# Patient Record
Sex: MALE | Race: WHITE | NOT HISPANIC OR LATINO | Employment: OTHER | URBAN - METROPOLITAN AREA
[De-identification: names, ages, dates, MRNs, and addresses within clinical notes are randomized per-mention and may not be internally consistent; named-entity substitution may affect disease eponyms.]

---

## 2020-01-13 ENCOUNTER — OFFICE VISIT (OUTPATIENT)
Dept: PRIMARY CARE CLINIC | Facility: CLINIC | Age: 55
End: 2020-01-13
Payer: MEDICARE

## 2020-01-13 VITALS
OXYGEN SATURATION: 98 % | HEART RATE: 108 BPM | BODY MASS INDEX: 39.56 KG/M2 | RESPIRATION RATE: 18 BRPM | TEMPERATURE: 98 F | HEIGHT: 68 IN | SYSTOLIC BLOOD PRESSURE: 126 MMHG | WEIGHT: 261 LBS | DIASTOLIC BLOOD PRESSURE: 78 MMHG

## 2020-01-13 DIAGNOSIS — R07.89 ATYPICAL CHEST PAIN: ICD-10-CM

## 2020-01-13 DIAGNOSIS — M96.1 POST LAMINECTOMY SYNDROME: ICD-10-CM

## 2020-01-13 DIAGNOSIS — S16.1XXD STRAIN OF NECK MUSCLE, SUBSEQUENT ENCOUNTER: ICD-10-CM

## 2020-01-13 DIAGNOSIS — R10.32 LEFT LOWER QUADRANT ABDOMINAL PAIN: ICD-10-CM

## 2020-01-13 DIAGNOSIS — H40.9 GLAUCOMA, UNSPECIFIED GLAUCOMA TYPE, UNSPECIFIED LATERALITY: ICD-10-CM

## 2020-01-13 DIAGNOSIS — R05.3 CHRONIC COUGH: ICD-10-CM

## 2020-01-13 DIAGNOSIS — S39.012D LUMBOSACRAL STRAIN, SUBSEQUENT ENCOUNTER: ICD-10-CM

## 2020-01-13 DIAGNOSIS — I10 HYPERTENSION, UNSPECIFIED TYPE: ICD-10-CM

## 2020-01-13 DIAGNOSIS — R06.02 SHORTNESS OF BREATH: ICD-10-CM

## 2020-01-13 DIAGNOSIS — F41.9 ANXIETY: ICD-10-CM

## 2020-01-13 DIAGNOSIS — C44.90 SKIN CANCER: ICD-10-CM

## 2020-01-13 DIAGNOSIS — M51.36 DDD (DEGENERATIVE DISC DISEASE), LUMBAR: ICD-10-CM

## 2020-01-13 DIAGNOSIS — E78.5 HYPERLIPIDEMIA, UNSPECIFIED HYPERLIPIDEMIA TYPE: ICD-10-CM

## 2020-01-13 DIAGNOSIS — M50.30 DDD (DEGENERATIVE DISC DISEASE), CERVICAL: ICD-10-CM

## 2020-01-13 DIAGNOSIS — R51.9 NONINTRACTABLE HEADACHE, UNSPECIFIED CHRONICITY PATTERN, UNSPECIFIED HEADACHE TYPE: ICD-10-CM

## 2020-01-13 DIAGNOSIS — E66.9 OBESITY, UNSPECIFIED CLASSIFICATION, UNSPECIFIED OBESITY TYPE, UNSPECIFIED WHETHER SERIOUS COMORBIDITY PRESENT: ICD-10-CM

## 2020-01-13 DIAGNOSIS — F32.A DEPRESSION, UNSPECIFIED DEPRESSION TYPE: ICD-10-CM

## 2020-01-13 DIAGNOSIS — G89.4 CHRONIC PAIN SYNDROME: ICD-10-CM

## 2020-01-13 DIAGNOSIS — K43.9 VENTRAL HERNIA WITHOUT OBSTRUCTION OR GANGRENE: ICD-10-CM

## 2020-01-13 DIAGNOSIS — K59.00 CONSTIPATION, UNSPECIFIED CONSTIPATION TYPE: ICD-10-CM

## 2020-01-13 DIAGNOSIS — R42 DIZZINESS: ICD-10-CM

## 2020-01-13 DIAGNOSIS — N62 GYNECOMASTIA: ICD-10-CM

## 2020-01-13 DIAGNOSIS — R32 URINARY INCONTINENCE, UNSPECIFIED TYPE: ICD-10-CM

## 2020-01-13 PROBLEM — S39.012A LUMBOSACRAL STRAIN: Status: ACTIVE | Noted: 2020-01-13

## 2020-01-13 PROBLEM — S16.1XXA CERVICAL STRAIN: Status: ACTIVE | Noted: 2020-01-13

## 2020-01-13 PROCEDURE — 99204 OFFICE O/P NEW MOD 45 MIN: CPT | Mod: S$GLB,,, | Performed by: FAMILY MEDICINE

## 2020-01-13 PROCEDURE — 3008F BODY MASS INDEX DOCD: CPT | Mod: CPTII,S$GLB,, | Performed by: FAMILY MEDICINE

## 2020-01-13 PROCEDURE — 99999 PR PBB SHADOW E&M-NEW PATIENT-LVL V: ICD-10-PCS | Mod: PBBFAC,,, | Performed by: FAMILY MEDICINE

## 2020-01-13 PROCEDURE — 3008F PR BODY MASS INDEX (BMI) DOCUMENTED: ICD-10-PCS | Mod: CPTII,S$GLB,, | Performed by: FAMILY MEDICINE

## 2020-01-13 PROCEDURE — 99204 PR OFFICE/OUTPT VISIT, NEW, LEVL IV, 45-59 MIN: ICD-10-PCS | Mod: S$GLB,,, | Performed by: FAMILY MEDICINE

## 2020-01-13 PROCEDURE — 99999 PR PBB SHADOW E&M-NEW PATIENT-LVL V: CPT | Mod: PBBFAC,,, | Performed by: FAMILY MEDICINE

## 2020-01-13 RX ORDER — GABAPENTIN 800 MG/1
1 TABLET ORAL 2 TIMES DAILY
COMMUNITY
Start: 2020-01-02 | End: 2022-06-28 | Stop reason: SDUPTHER

## 2020-01-13 RX ORDER — LATANOPROST 50 UG/ML
1 SOLUTION/ DROPS OPHTHALMIC NIGHTLY
COMMUNITY
Start: 2019-12-04

## 2020-01-13 RX ORDER — AZITHROMYCIN 250 MG/1
TABLET, FILM COATED ORAL
Qty: 6 TABLET | Refills: 0 | Status: SHIPPED | OUTPATIENT
Start: 2020-01-13 | End: 2020-01-17

## 2020-01-13 RX ORDER — CYCLOBENZAPRINE HCL 10 MG
1 TABLET ORAL NIGHTLY PRN
COMMUNITY
Start: 2020-01-02 | End: 2022-06-28 | Stop reason: SDUPTHER

## 2020-01-13 RX ORDER — IBUPROFEN 800 MG/1
1 TABLET ORAL EVERY 6 HOURS PRN
COMMUNITY
Start: 2020-01-02 | End: 2022-09-21 | Stop reason: CLARIF

## 2020-01-13 RX ORDER — ATORVASTATIN CALCIUM 10 MG/1
1 TABLET, FILM COATED ORAL DAILY
COMMUNITY
Start: 2020-01-02 | End: 2020-01-21 | Stop reason: DRUGHIGH

## 2020-01-13 RX ORDER — HYDROCODONE BITARTRATE AND ACETAMINOPHEN 10; 325 MG/1; MG/1
1 TABLET ORAL EVERY 6 HOURS PRN
COMMUNITY
Start: 2020-01-02 | End: 2024-02-06

## 2020-01-13 RX ORDER — SERTRALINE HYDROCHLORIDE 100 MG/1
1 TABLET, FILM COATED ORAL DAILY
COMMUNITY
Start: 2020-01-02 | End: 2022-06-28 | Stop reason: SDUPTHER

## 2020-01-13 RX ORDER — FENOFIBRATE 145 MG/1
1 TABLET, FILM COATED ORAL ONCE
COMMUNITY
Start: 2020-01-02 | End: 2020-12-09 | Stop reason: SDUPTHER

## 2020-01-13 RX ORDER — ENALAPRIL MALEATE AND HYDROCHLOROTHIAZIDE 10; 25 MG/1; MG/1
1 TABLET ORAL EVERY MORNING
COMMUNITY
Start: 2020-01-02 | End: 2020-12-09 | Stop reason: SDUPTHER

## 2020-01-13 NOTE — PROGRESS NOTES
Subjective:       Patient ID: Collin Almaguer Sr. is a 54 y.o. male.    Chief Complaint: Establish Care and Headache    HPI:  54 year white male in for new PCP complaining of a headache.  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  Hit head back.  That is 1 back problems started and HA's.  Last 5-6 years neck is gotten markedly worse with stiffness--id bad headaches and nausea.  History of about 5 different back injuries-- sees Dr Tunde Kim--pain MD--history of a laminectomy and a back fusion to remove a tumor left patient on disability--did not heal right--2010 Nov was the date of the surgery.      HA-- feels neck stiff in sides--top--all over---quality--throbbing, severity--ibuprofen 800 mg--occasionally knocks it out--frequency 4-5 times per week  duration with ibuprofen occasionally going to 10 min occasion last a few hours tries to lay down and relax.  No real workup for neck  ROS:  Skin: no psoriasis, eczema, skin cancer--Skin Cancer left cheek--Dr Wise-he did a biopsy--referred to the [facility] and [place]. Dr Esteves   HEENT: + headache, ocular pain, blurred vision, diplopia, epistaxis, hoarseness change in voice, thyroid trouble +glaucoma itis bilaterally left greater than right  Lung: No pneumonia, asthma, Tb, wheezing, SOB, no smoking  Heart:  +occasional chest pain,no ankle edema, palpitations, MI, tiffanie murmur,+hypertension,+ hyperlipidemia--no stent bypass arrhythmia  Abdomen: +nausea, no  vomiting, diarrhea,+ constipation, no  ulcers, hepatitis, gallbladder disease, melena, hematochezia, hematemesis some pain occasionally in the right lower quadrant  : no UTI, renal disease, stones prostate  MS: no fractures, O/A,  lupus, rheumatoid, gout--neck and back problem see history of present illness  Neuro: +  Dizziness, no  LOC, seizures +headache   No diabetes, no anemia, n+nxiety, + depression on meds Disabled  --being home all the time makes patient depressed--found father is at home  2014  , 5 children, disability lives alone        Objective:   Physical Exam:  General: Well nourished, well developed, no acute distress + obesity  Skin:  Skin lesion left cheek and just above the upper lip area be shaped scar with slight ulcer in the center quarter cm   HEENT: Eyes PERRLA, EOM intact, nose patent, throat non-erythematous ears TMs clear  NECK: Supple, no bruits, No JVD, no nodes  Breast bilateral breast nodules   Lungs: Clear, no rales, rhonchi, wheezing  Heart: Regular rate and rhythm, no murmurs, gallops, or rubs  Abdomen: flat, bowel sounds positive, no tenderness, or organomegaly slight ventral hernia between xiphoid and the umbilicus history tenderness right lower quadrant none now  Genitalia circumcised no hernia tests normal  Rectal exam negative   MS: Range of motion and muscle strength intact --tenderness cervical spine C3 through 7 pain with lateral flexion rotation range of motion muscle strength the arms intact tenderness lumbar   spine L1-S1 anterior flexion 10° extension 10° lateral flexion rotation 10° able squat arise without difficulty reflexes 2/4  Neuro: Alert, CN intact, oriented X 3 Romberg negative heel-toe slight swaying  Extremities: No cyanosis, clubbing, or edema         Assessment:       1. Nonintractable headache, unspecified chronicity pattern, unspecified headache type    2. Glaucoma, unspecified glaucoma type, unspecified laterality    3. Skin cancer    4. Urinary incontinence, unspecified type    5. Gynecomastia    6. Chronic cough    7. Shortness of breath    8. Obesity, unspecified classification, unspecified obesity type, unspecified whether serious comorbidity present     9. Constipation, unspecified constipation type    10. Strain of neck muscle, subsequent encounter    11. Lumbosacral strain, subsequent encounter    12. Post laminectomy syndrome    13. DDD (degenerative disc disease), lumbar    14. DDD (degenerative disc disease), cervical    15. Chronic pain syndrome    16. Hypertension, unspecified type    17. Hyperlipidemia, unspecified hyperlipidemia type    18. Left lower quadrant abdominal pain    19. Dizziness    20. Atypical chest pain    21. Anxiety    22. Ventral hernia without obstruction or gangrene    23. Depression, unspecified depression type        Plan:       Nonintractable headache, unspecified chronicity pattern, unspecified headache type  -     MRI Brain W WO Contrast; Future; Expected date: 01/13/2020  -     T4, free; Future; Expected date: 01/13/2020  -     TSH; Future; Expected date: 01/13/2020  -     Testosterone; Future; Expected date: 01/13/2020  -     Testosterone, free; Future; Expected date: 01/13/2020    Glaucoma, unspecified glaucoma type, unspecified laterality  -     T4, free; Future; Expected date: 01/13/2020  -     TSH; Future; Expected date: 01/13/2020  -     Testosterone; Future; Expected date: 01/13/2020  -     Testosterone, free; Future; Expected date: 01/13/2020    Skin cancer  -     T4, free; Future; Expected date: 01/13/2020  -     TSH; Future; Expected date: 01/13/2020  -     Testosterone; Future; Expected date: 01/13/2020  -     Testosterone, free; Future; Expected date: 01/13/2020    Urinary incontinence, unspecified type  -     Ambulatory referral to Urology  -     PSA, Screening; Future; Expected date: 01/13/2020  -     T4, free; Future; Expected date: 01/13/2020  -     TSH; Future; Expected date: 01/13/2020  -     Testosterone; Future; Expected date: 01/13/2020  -     Testosterone, free; Future; Expected date: 01/13/2020    Gynecomastia  -     T4, free; Future; Expected date: 01/13/2020  -     TSH; Future; Expected date:  01/13/2020  -     Testosterone; Future; Expected date: 01/13/2020  -     Testosterone, free; Future; Expected date: 01/13/2020  -     Mammo Digital Screening Bilat without CA; Future; Expected date: 01/27/2020    Chronic cough  -     T4, free; Future; Expected date: 01/13/2020  -     TSH; Future; Expected date: 01/13/2020  -     Testosterone; Future; Expected date: 01/13/2020  -     Testosterone, free; Future; Expected date: 01/13/2020    Shortness of breath  -     T4, free; Future; Expected date: 01/13/2020  -     TSH; Future; Expected date: 01/13/2020  -     Testosterone; Future; Expected date: 01/13/2020  -     Testosterone, free; Future; Expected date: 01/13/2020    Obesity, unspecified classification, unspecified obesity type, unspecified whether serious comorbidity present  -     T4, free; Future; Expected date: 01/13/2020  -     TSH; Future; Expected date: 01/13/2020  -     Testosterone; Future; Expected date: 01/13/2020  -     Testosterone, free; Future; Expected date: 01/13/2020    Constipation, unspecified constipation type  -     CT Abdomen Pelvis W Wo Contrast; Future; Expected date: 01/13/2020  -     T4, free; Future; Expected date: 01/13/2020  -     TSH; Future; Expected date: 01/13/2020  -     Testosterone; Future; Expected date: 01/13/2020  -     Testosterone, free; Future; Expected date: 01/13/2020    Strain of neck muscle, subsequent encounter  -     T4, free; Future; Expected date: 01/13/2020  -     TSH; Future; Expected date: 01/13/2020  -     Testosterone; Future; Expected date: 01/13/2020  -     Testosterone, free; Future; Expected date: 01/13/2020    Lumbosacral strain, subsequent encounter  -     T4, free; Future; Expected date: 01/13/2020  -     TSH; Future; Expected date: 01/13/2020  -     Testosterone; Future; Expected date: 01/13/2020  -     Testosterone, free; Future; Expected date: 01/13/2020    Post laminectomy syndrome  -     T4, free; Future; Expected date: 01/13/2020  -     TSH;  Future; Expected date: 01/13/2020  -     Testosterone; Future; Expected date: 01/13/2020  -     Testosterone, free; Future; Expected date: 01/13/2020    DDD (degenerative disc disease), lumbar  -     Sedimentation rate; Future; Expected date: 01/13/2020  -     RPR; Future; Expected date: 01/13/2020  -     Rheumatoid factor; Future; Expected date: 01/13/2020  -     GILA Screen w/Reflex; Future; Expected date: 01/13/2020  -     T4, free; Future; Expected date: 01/13/2020  -     TSH; Future; Expected date: 01/13/2020  -     Testosterone; Future; Expected date: 01/13/2020  -     Testosterone, free; Future; Expected date: 01/13/2020    DDD (degenerative disc disease), cervical  -     Sedimentation rate; Future; Expected date: 01/13/2020  -     RPR; Future; Expected date: 01/13/2020  -     Rheumatoid factor; Future; Expected date: 01/13/2020  -     GILA Screen w/Reflex; Future; Expected date: 01/13/2020  -     T4, free; Future; Expected date: 01/13/2020  -     TSH; Future; Expected date: 01/13/2020  -     Testosterone; Future; Expected date: 01/13/2020  -     Testosterone, free; Future; Expected date: 01/13/2020    Chronic pain syndrome  -     Sedimentation rate; Future; Expected date: 01/13/2020  -     RPR; Future; Expected date: 01/13/2020  -     Rheumatoid factor; Future; Expected date: 01/13/2020  -     GILA Screen w/Reflex; Future; Expected date: 01/13/2020  -     T4, free; Future; Expected date: 01/13/2020  -     TSH; Future; Expected date: 01/13/2020  -     Testosterone; Future; Expected date: 01/13/2020  -     Testosterone, free; Future; Expected date: 01/13/2020    Hypertension, unspecified type  -     CBC auto differential; Future; Expected date: 01/13/2020  -     EKG 12-lead; Future  -     Fecal Immunochemical Test (iFOBT); Future; Expected date: 01/13/2020  -     Hemoglobin A1c; Future; Expected date: 01/13/2020  -     Lipid panel; Future; Expected date: 01/13/2020  -     X-Ray Chest PA And Lateral; Future;  Expected date: 01/13/2020  -     POCT urine dipstick without microscope  -     T4, free; Future; Expected date: 01/13/2020  -     TSH; Future; Expected date: 01/13/2020  -     Testosterone; Future; Expected date: 01/13/2020  -     Testosterone, free; Future; Expected date: 01/13/2020    Hyperlipidemia, unspecified hyperlipidemia type  -     T4, free; Future; Expected date: 01/13/2020  -     TSH; Future; Expected date: 01/13/2020  -     Testosterone; Future; Expected date: 01/13/2020  -     Testosterone, free; Future; Expected date: 01/13/2020    Left lower quadrant abdominal pain  -     CT Abdomen Pelvis W Wo Contrast; Future; Expected date: 01/13/2020  -     T4, free; Future; Expected date: 01/13/2020  -     TSH; Future; Expected date: 01/13/2020  -     Testosterone; Future; Expected date: 01/13/2020  -     Testosterone, free; Future; Expected date: 01/13/2020    Dizziness  -     T4, free; Future; Expected date: 01/13/2020  -     TSH; Future; Expected date: 01/13/2020  -     Testosterone; Future; Expected date: 01/13/2020  -     Testosterone, free; Future; Expected date: 01/13/2020    Atypical chest pain  -     T4, free; Future; Expected date: 01/13/2020  -     TSH; Future; Expected date: 01/13/2020  -     Testosterone; Future; Expected date: 01/13/2020  -     Testosterone, free; Future; Expected date: 01/13/2020    Anxiety  -     Ambulatory consult to Psychology  -     T4, free; Future; Expected date: 01/13/2020  -     TSH; Future; Expected date: 01/13/2020  -     Testosterone; Future; Expected date: 01/13/2020  -     Testosterone, free; Future; Expected date: 01/13/2020    Ventral hernia without obstruction or gangrene  -     CT Abdomen Pelvis W Wo Contrast; Future; Expected date: 01/13/2020  -     T4, free; Future; Expected date: 01/13/2020  -     TSH; Future; Expected date: 01/13/2020  -     Testosterone; Future; Expected date: 01/13/2020  -     Testosterone, free; Future; Expected date:  01/13/2020    Depression, unspecified depression type  -     Ambulatory consult to Psychology  -     T4, free; Future; Expected date: 01/13/2020  -     TSH; Future; Expected date: 01/13/2020  -     Testosterone; Future; Expected date: 01/13/2020  -     Testosterone, free; Future; Expected date: 01/13/2020      headache--patient needs to do a headache diary---list quality of the headache/severity of the headache/distribution of the headache/frequency of the headache/in duration--Fioricet 1 p.o. q.d. p.r.n. Headache--MRI of the brain with without gadolinium patient worried has a aneurysm--if negative and headaches persistent appears to be coming from the neck may consider MRI the neck but probably had this done in the past since has DDD cervical spine  Atypical chest pain/hypertension/hyperlipidemia/obesity--needs to be on low-sodium low-fat diet--exercise as tolerated--trying get to ideal body weight--Needs routine lab CBCs CMP lipids T4 TSH stool guaiac UA chest x-ray EKG is physical Needs arm blood pressure cuff check blood pressure daily Needs blood pressure be 140/90  Breast tenderness with gynecomastia--mammogram--check testosterone labels free and total  Anxiety/depression---patient upset homebound not able to work on disability father found dead 2014 --needs psychologist for counseling  Chronic cough Zithromax/Mucinex DM since patient in chronic pain clinic   Continue with chronic pain clinic--chronic pain syndrome--DDD cervical DDD lumbar--history [place] laminectomy and lumbar fusion on disability since 2010  Constipation--increase fiber increased fruits increase water avoid rice and bananas--MiraLax 1 cap in 6 oz of water q.d. for constipation--chronic right lower quadrant pain--ventral hernia--needs CT scan abdomen pelvis  Dizziness  Skin cancer Keep appointment Dr. [facility]  Urinary incontinence--unable reach prostate for good exam--will do PSA level--referral to urologist due to  urinary incontinence

## 2020-01-14 PROBLEM — E29.1 HYPOGONADISM MALE: Status: ACTIVE | Noted: 2020-01-14

## 2020-01-15 ENCOUNTER — TELEPHONE (OUTPATIENT)
Dept: PRIMARY CARE CLINIC | Facility: CLINIC | Age: 55
End: 2020-01-15

## 2020-01-15 NOTE — TELEPHONE ENCOUNTER
----- Message from Olga Ludwig sent at 1/15/2020  1:09 PM CST -----  Contact: Pt [phone]  Telephone consult    The patient wants to make sure you understood the cough: It comes every few days or twice a week therefore, he wants to know if you still want him to take the Zpac.    Thank you

## 2020-01-15 NOTE — TELEPHONE ENCOUNTER
Verbalized to patient that since his cough is chronic and CXR is normal I will leave it up to him if he would like to start the Azithromycin. Verbalized to patient labs are still pending. Someone from the office will call with the results once final. Patient also has questions regarding price of MRI, CT, and mammogram. Verbalized he needs to contact the billing department in regards to pricing. Patient verbalized understanding.

## 2020-01-16 ENCOUNTER — TELEPHONE (OUTPATIENT)
Dept: PRIMARY CARE CLINIC | Facility: CLINIC | Age: 55
End: 2020-01-16

## 2020-01-16 NOTE — TELEPHONE ENCOUNTER
Per [facility] pharmacy, JUAN number for physician already provided. Nothing further discussed.

## 2020-01-16 NOTE — TELEPHONE ENCOUNTER
----- Message from Renetta Chakraborty sent at 1/16/2020  1:34 PM CST -----  Contact: Thelma with [facility] phone [phone]  Type:  Pharmacy Calling to Clarify an RX    Name of Caller:  Thelma  Pharmacy Name:  [facility]  Prescription Name:  Valium 10 mg  What do they need to clarify?:  Needs JUAN number on the prescription  Best Call Back Number:  [phone]  Additional Information:  Please advise. Thanks.

## 2020-01-21 NOTE — TELEPHONE ENCOUNTER
"Per your lab results "Call tell chest Xray NEG lLAb total testosteroine low 112 sed rate42 but GILA,Rheu Factor RPR -- Chol 310 better 180,  better 150 HDL 15 can increase with exercise PSA TSH T4 HGB A1C 5.3 See if pton atorvastatin when test done if soincrease to 20 mg 1 po qPM redo lab 6 mo CBC,CMP,lipid see me 2 week lemuel Thomason D/C lopid and start fenfibrate 134 mg 1 po qd Low NA Low Fat diet [id number] as able decrease weight if needed Needs get copies all lab see urologist see about getting on testoisterone "    Please sign new rx for Atorvastatin. Patient is not taking Lopid. He is already on 145mg of Fenofibrate. Patient already has a referral to Dr. Che. I have faxed his labs and told he needs to call to make an appointment   "

## 2020-01-22 RX ORDER — ATORVASTATIN CALCIUM 20 MG/1
20 TABLET, FILM COATED ORAL DAILY
Qty: 90 TABLET | Refills: 3 | Status: SHIPPED | OUTPATIENT
Start: 2020-01-22 | End: 2020-12-09 | Stop reason: SDUPTHER

## 2020-01-24 PROBLEM — N20.0 RIGHT NEPHROLITHIASIS: Status: ACTIVE | Noted: 2020-01-24

## 2020-01-24 PROBLEM — N18.1 CHRONIC RENAL IMPAIRMENT, STAGE 1: Status: ACTIVE | Noted: 2020-01-24

## 2020-01-24 PROBLEM — Z87.19 HISTORY OF DIVERTICULOSIS: Status: ACTIVE | Noted: 2020-01-24

## 2020-01-28 ENCOUNTER — TELEPHONE (OUTPATIENT)
Dept: PRIMARY CARE CLINIC | Facility: CLINIC | Age: 55
End: 2020-01-28

## 2020-01-28 NOTE — TELEPHONE ENCOUNTER
Patient returned my call, mammogram and MRI of brain results discussed. Patient verbalized understanding. Patient requesting results of CT and creatinine Serum, ASAP. Please review.

## 2020-01-28 NOTE — TELEPHONE ENCOUNTER
----- Message from Eriebrto Schulte sent at 1/28/2020  3:43 PM CST -----  Contact: Pt  Patient called to have the results of his test read to him requesting callback    [phone]

## 2020-01-28 NOTE — TELEPHONE ENCOUNTER
"Returned patient's call at the number provided. "Call can not be completed at this time." Unable to leave VM.   "

## 2020-02-06 DIAGNOSIS — I10 HYPERTENSION: ICD-10-CM

## 2020-03-31 ENCOUNTER — NURSE TRIAGE (OUTPATIENT)
Dept: ADMINISTRATIVE | Facility: CLINIC | Age: 55
End: 2020-03-31

## 2020-03-31 ENCOUNTER — OFFICE VISIT (OUTPATIENT)
Dept: PRIMARY CARE CLINIC | Facility: CLINIC | Age: 55
End: 2020-03-31
Payer: MEDICARE

## 2020-03-31 ENCOUNTER — TELEPHONE (OUTPATIENT)
Dept: PRIMARY CARE CLINIC | Facility: CLINIC | Age: 55
End: 2020-03-31

## 2020-03-31 VITALS
TEMPERATURE: 98 F | BODY MASS INDEX: 41.65 KG/M2 | HEART RATE: 76 BPM | RESPIRATION RATE: 18 BRPM | WEIGHT: 274.81 LBS | OXYGEN SATURATION: 97 % | DIASTOLIC BLOOD PRESSURE: 76 MMHG | SYSTOLIC BLOOD PRESSURE: 120 MMHG | HEIGHT: 68 IN

## 2020-03-31 DIAGNOSIS — E29.1 HYPOGONADISM MALE: ICD-10-CM

## 2020-03-31 DIAGNOSIS — N18.1 CHRONIC RENAL IMPAIRMENT, STAGE 1: ICD-10-CM

## 2020-03-31 DIAGNOSIS — M51.36 DDD (DEGENERATIVE DISC DISEASE), LUMBAR: ICD-10-CM

## 2020-03-31 DIAGNOSIS — I10 HYPERTENSION, UNSPECIFIED TYPE: ICD-10-CM

## 2020-03-31 DIAGNOSIS — E66.9 OBESITY, UNSPECIFIED CLASSIFICATION, UNSPECIFIED OBESITY TYPE, UNSPECIFIED WHETHER SERIOUS COMORBIDITY PRESENT: ICD-10-CM

## 2020-03-31 DIAGNOSIS — M50.30 DDD (DEGENERATIVE DISC DISEASE), CERVICAL: ICD-10-CM

## 2020-03-31 DIAGNOSIS — L25.9 CONTACT DERMATITIS, UNSPECIFIED CONTACT DERMATITIS TYPE, UNSPECIFIED TRIGGER: ICD-10-CM

## 2020-03-31 DIAGNOSIS — E78.5 HYPERLIPIDEMIA, UNSPECIFIED HYPERLIPIDEMIA TYPE: ICD-10-CM

## 2020-03-31 PROCEDURE — 99999 PR PBB SHADOW E&M-EST. PATIENT-LVL IV: CPT | Mod: PBBFAC,,, | Performed by: FAMILY MEDICINE

## 2020-03-31 PROCEDURE — 96372 PR INJECTION,THERAP/PROPH/DIAG2ST, IM OR SUBCUT: ICD-10-PCS | Mod: S$GLB,,, | Performed by: FAMILY MEDICINE

## 2020-03-31 PROCEDURE — 3008F PR BODY MASS INDEX (BMI) DOCUMENTED: ICD-10-PCS | Mod: CPTII,S$GLB,, | Performed by: FAMILY MEDICINE

## 2020-03-31 PROCEDURE — 3078F DIAST BP <80 MM HG: CPT | Mod: CPTII,S$GLB,, | Performed by: FAMILY MEDICINE

## 2020-03-31 PROCEDURE — 3078F PR MOST RECENT DIASTOLIC BLOOD PRESSURE < 80 MM HG: ICD-10-PCS | Mod: CPTII,S$GLB,, | Performed by: FAMILY MEDICINE

## 2020-03-31 PROCEDURE — 96372 THER/PROPH/DIAG INJ SC/IM: CPT | Mod: S$GLB,,, | Performed by: FAMILY MEDICINE

## 2020-03-31 PROCEDURE — 3008F BODY MASS INDEX DOCD: CPT | Mod: CPTII,S$GLB,, | Performed by: FAMILY MEDICINE

## 2020-03-31 PROCEDURE — 3074F SYST BP LT 130 MM HG: CPT | Mod: CPTII,S$GLB,, | Performed by: FAMILY MEDICINE

## 2020-03-31 PROCEDURE — 99213 OFFICE O/P EST LOW 20 MIN: CPT | Mod: 25,S$GLB,, | Performed by: FAMILY MEDICINE

## 2020-03-31 PROCEDURE — 3074F PR MOST RECENT SYSTOLIC BLOOD PRESSURE < 130 MM HG: ICD-10-PCS | Mod: CPTII,S$GLB,, | Performed by: FAMILY MEDICINE

## 2020-03-31 PROCEDURE — 99213 PR OFFICE/OUTPT VISIT, EST, LEVL III, 20-29 MIN: ICD-10-PCS | Mod: 25,S$GLB,, | Performed by: FAMILY MEDICINE

## 2020-03-31 PROCEDURE — 99999 PR PBB SHADOW E&M-EST. PATIENT-LVL IV: ICD-10-PCS | Mod: PBBFAC,,, | Performed by: FAMILY MEDICINE

## 2020-03-31 RX ORDER — LORATADINE 10 MG/1
10 TABLET ORAL DAILY
Qty: 30 TABLET | Refills: 5 | Status: SHIPPED | OUTPATIENT
Start: 2020-03-31 | End: 2020-12-09

## 2020-03-31 RX ORDER — TRIAMCINOLONE ACETONIDE 40 MG/ML
40 INJECTION, SUSPENSION INTRA-ARTICULAR; INTRAMUSCULAR ONCE
Status: COMPLETED | OUTPATIENT
Start: 2020-03-31 | End: 2020-03-31

## 2020-03-31 RX ORDER — BETAMETHASONE VALERATE 1.2 MG/G
CREAM TOPICAL 2 TIMES DAILY
Qty: 60 G | Refills: 2 | Status: SHIPPED | OUTPATIENT
Start: 2020-03-31 | End: 2022-09-21 | Stop reason: CLARIF

## 2020-03-31 RX ORDER — TESTOSTERONE CYPIONATE 200 MG/ML
INJECTION, SOLUTION INTRAMUSCULAR
COMMUNITY
Start: 2020-03-02 | End: 2022-09-21 | Stop reason: CLARIF

## 2020-03-31 RX ORDER — METHYLPREDNISOLONE 4 MG/1
TABLET ORAL
Qty: 1 PACKAGE | Refills: 0 | Status: SHIPPED | OUTPATIENT
Start: 2020-03-31 | End: 2020-12-09

## 2020-03-31 RX ORDER — HYDROXYZINE HYDROCHLORIDE 25 MG/1
25 TABLET, FILM COATED ORAL 3 TIMES DAILY
Qty: 30 TABLET | Refills: 1 | Status: SHIPPED | OUTPATIENT
Start: 2020-03-31 | End: 2020-12-09

## 2020-03-31 RX ORDER — OXYBUTYNIN CHLORIDE 10 MG/1
TABLET, EXTENDED RELEASE ORAL
COMMUNITY
Start: 2020-03-26 | End: 2022-06-28 | Stop reason: SDUPTHER

## 2020-03-31 RX ORDER — DIAZEPAM 10 MG/1
TABLET ORAL
Status: ON HOLD | COMMUNITY
Start: 2020-01-16 | End: 2021-01-04 | Stop reason: CLARIF

## 2020-03-31 RX ADMIN — TRIAMCINOLONE ACETONIDE 40 MG: 40 INJECTION, SUSPENSION INTRA-ARTICULAR; INTRAMUSCULAR at 11:03

## 2020-03-31 NOTE — TELEPHONE ENCOUNTER
A week ago started feeling little red itchy PT advised to be seen today. Pt states he may go to the ED because itching is bad. Requesting an appt at Dr. Armendariz office if he does not go to the ED.  Reason for Disposition   Hives or itching    Additional Information   Negative: Rash is only on 1 part of the body (localized)   Negative: Taking new non-prescription (OTC) antihistamine, decongestant, ear drops, eye drops, or other OTC cough/cold medicine   Negative: Taking new prescription antihistamine, allergy medicine, asthma medicine, eye drops, ear drops or nose drops   Negative: Rash started more than 3 days after stopping new prescription medicine   Negative: [1] Life-threatening reaction (anaphylaxis) in the past to the same drug AND [2] < 2 hours since exposure   Negative: Difficulty breathing or wheezing   Negative: [1] Hoarseness or cough AND [2] started soon after 1st dose of drug   Negative: [1] Swollen tongue AND [2] started soon after 1st dose of drug   Negative: [1] Purple or blood-colored rash (spots or dots) AND [2] fever   Negative: Sounds like a life-threatening emergency to the triager   Negative: Swollen tongue   Negative: [1] Widespread hives AND [2] onset < 2 hours of exposure to 1st dose of drug   Negative: Fever   Negative: Patient sounds very sick or weak to the triager   Negative: [1] Purple or blood-colored rash (spots or dots) AND [2] no fever AND [3] sounds well to triager   Negative: [1] Taking new prescription medication AND [2] rash within 4 hours of 1st dose   Negative: Large or small blisters on skin (i.e., fluid filled bubbles or sacs)   Negative: Bloody crusts on lips or sores in mouth   Negative: Face or lip swelling    Protocols used: RASH - WIDESPREAD ON DRUGS-A-AH

## 2020-03-31 NOTE — TELEPHONE ENCOUNTER
----- Message from Harrietlianet Marvin sent at 3/31/2020  8:02 AM CDT -----  Contact: self/[phone]  .1 Patient would like to get medical advice.  Symptoms (please be specific): itching  How long has patient had these symptoms: a week ago  Pharmacy name and phone#:[phone] [id number] - [id number] [phone] (Phone) [phone] (Fax)  Any drug allergies:  Comments: Patient would like to get medical advice. Patient does not want to go to emergency, Patient is asking not computer visit. Please call and advise. Thank you

## 2020-03-31 NOTE — PROGRESS NOTES
Verified pt ID using name and . Verified allergies. Administered 40 mg kenalog in right VG per physician order using aseptic technique. Aspirated and no blood return noted. Pt tolerated well with no adverse reactions noted.

## 2020-03-31 NOTE — PROGRESS NOTES
Subjective:       Patient ID: Collin Almaguer Sr. is a 55 y.o. male.    Chief Complaint: Rash (itchy)    HPI:  54 year white male -- patient complaining of a- itch-y rash on the abdomen back forearms--- started 1 week ago--- no new soaps to toes-- no new clothes-- no new foods-- no new medicine--Friday cut grass --place where torn down shed was weedeating ---son used soap in shower--sat on bench in park x 30min.  Itches all over mainly on the right side of the abdomen some of the lesions on the back arms bilaterally -- is even on the thighs lower legs feet    ROS:  Skin: no psoriasis, eczema, skin cancer--Skin Cancer left cheek--Dr Wise-he did a biopsy--referred to the skin Center and [place]. Dr Esteves   HEENT: +occs  headache, ocular pain, blurred vision, diplopia, epistaxis, hoarseness change in voice, thyroid trouble +glaucoma itis bilaterally left greater than right  Lung: No pneumonia, asthma, Tb, wheezing, SOB, no smoking  Heart: no chest pain,no ankle edema, palpitations, MI, tiffanie murmur,+hypertension,+ hyperlipidemia--no stent bypass arrhythmia  Abdomen: no nausea, no  vomiting, diarrhea,+ constipation, no  ulcers, hepatitis, gallbladder disease, melena, hematochezia, hematemesis   : no UTI, renal disease, stones prostate  MS: no fractures, O/A, lupus, rheumatoid, gout--neck and back problestabl now comes and goes   Neuro: no Dizziness, no  LOC, seizures   No diabetes, no anemia, n+nxiety, + depression on meds Disabled  --being home all the time makes patient depressed--found father is at home  2014  , 5 children, disability lives alone        Objective:   Physical Exam:  General: Well nourished, well developed, no acute distress + obesity  Skin:  Follicular lesions abd and arms legs back--pruritic    HEENT: Eyes PERRLA, EOM intact, nose patent, throat non-erythematous ears TMs clear  NECK: Supple, no bruits, No JVD, no nodes  Breast bilateral breast nodules    Lungs: Clear, no rales, rhonchi, wheezing  Heart: Regular rate and rhythm, no murmurs, gallops, or rubs  Abdomen: flat, bowel sounds positive, no tenderness, or organomegaly slight ventral hernia between xiphoid and the umbilicus history tenderness right lower quadrant none now  Genitalia circumcised no hernia tests normal  Rectal exam negative   MS: Range of motion and muscle strength intact -- problems with cervical and lumbar spine both her stable  Neuro: Alert, CN intact, oriented X 3 Romberg negative heel-toe slight swaying  Extremities: No cyanosis, clubbing, or edema         Assessment:       1. Contact dermatitis, unspecified contact dermatitis type, unspecified trigger    2. DDD (degenerative disc disease), cervical    3. DDD (degenerative disc disease), lumbar    4. Hyperlipidemia, unspecified hyperlipidemia type    5. Hypertension, unspecified type    6. Chronic renal impairment, stage 1    7. Hypogonadism male    8. Obesity, unspecified classification, unspecified obesity type, unspecified whether serious comorbidity present        Plan:       Contact dermatitis, unspecified contact dermatitis type, unspecified trigger    DDD (degenerative disc disease), cervical    DDD (degenerative disc disease), lumbar    Hyperlipidemia, unspecified hyperlipidemia type    Hypertension, unspecified type    Chronic renal impairment, stage 1    Hypogonadism male    Obesity, unspecified classification, unspecified obesity type, unspecified whether serious comorbidity present    Other orders  -     triamcinolone acetonide injection 40 mg  -     methylPREDNISolone (MEDROL DOSEPACK) 4 mg tablet; use as directed  Dispense: 1 Package; Refill: 0  -     loratadine (CLARITIN) 10 mg tablet; Take 1 tablet (10 mg total) by mouth once daily.  Dispense: 30 tablet; Refill: 5  -     betamethasone valerate 0.1% (VALISONE) 0.1 % Crea; Apply topically 2 (two) times daily.  Dispense: 60 g; Refill: 2  -     hydroxyzine HCL (ATARAX) 25 MG  tablet; Take 1 tablet (25 mg total) by mouth 3 (three) times daily.  Dispense: 30 tablet; Refill: 1       Contact dermatitis-- Kenalog-- tomorrow Wednesday start Medrol Dosepak-- Atarax 25 mg 1 p.o. Q.8 hours p.r.n. Itch-- Valisone cream apply to severe areas of itch t.i.d.  Wants well patient can try Claritin 10 mg over-the-counter or prescription covered can get through insurance-- rash appears to be secondary to contact dermatitis from weed eating him on lawn   history DDD cervical DDD lumbar spine headaches chronic pain syndrome history anxiety depression glaucoma hypertension hyperlipidemia  Hypogonadism obesity status post laminectomy   health maintenance HIV pneumococcal shingles:    Lab done in January no  New Lab due

## 2020-03-31 NOTE — TELEPHONE ENCOUNTER
Patient states has been itching for a week, red dots all over started on right side now has scratches all over, staying home social distancing, only time this had happened once before, it was mites or something like that . Offered patient an virtual visit states can not do it on his own made appointment to come in

## 2020-05-19 ENCOUNTER — PATIENT MESSAGE (OUTPATIENT)
Dept: PRIMARY CARE CLINIC | Facility: CLINIC | Age: 55
End: 2020-05-19

## 2020-07-29 ENCOUNTER — TELEPHONE (OUTPATIENT)
Dept: PRIMARY CARE CLINIC | Facility: CLINIC | Age: 55
End: 2020-07-29

## 2020-07-29 DIAGNOSIS — N62 GYNECOMASTIA: Primary | ICD-10-CM

## 2020-07-29 NOTE — TELEPHONE ENCOUNTER
Spoke with patient, verbalized that Dr. Armendariz recommending that he have a repeat mammogram. Order has been placed. Patient can call Roxana in regards to scheduling. Patient given contact information. Patient verbalized understanding.

## 2020-07-29 NOTE — TELEPHONE ENCOUNTER
----- Message from Renetta Chakraborty sent at 7/29/2020  3:09 PM CDT -----  Contact: Patient  Type: Needs Medical Advice    Who Called:  Collin patient  Symptoms (please be specific):  N/A  How long has patient had these symptoms:  N/A  Pharmacy name and phone #:  N/A  Best Call Back Number: [phone]  Additional Information: Calling because he received the mammogram letter. Please call him. Thanks.

## 2020-09-28 ENCOUNTER — PATIENT MESSAGE (OUTPATIENT)
Dept: PRIMARY CARE CLINIC | Facility: CLINIC | Age: 55
End: 2020-09-28

## 2020-09-30 ENCOUNTER — PATIENT MESSAGE (OUTPATIENT)
Dept: PRIMARY CARE CLINIC | Facility: CLINIC | Age: 55
End: 2020-09-30

## 2020-10-06 ENCOUNTER — OFFICE VISIT (OUTPATIENT)
Dept: PRIMARY CARE CLINIC | Facility: CLINIC | Age: 55
End: 2020-10-06
Payer: MEDICARE

## 2020-10-06 VITALS
DIASTOLIC BLOOD PRESSURE: 70 MMHG | HEART RATE: 107 BPM | HEIGHT: 68 IN | WEIGHT: 261.44 LBS | BODY MASS INDEX: 39.62 KG/M2 | RESPIRATION RATE: 19 BRPM | SYSTOLIC BLOOD PRESSURE: 100 MMHG | OXYGEN SATURATION: 96 %

## 2020-10-06 DIAGNOSIS — M96.1 POST LAMINECTOMY SYNDROME: ICD-10-CM

## 2020-10-06 DIAGNOSIS — H40.9 GLAUCOMA, UNSPECIFIED GLAUCOMA TYPE, UNSPECIFIED LATERALITY: ICD-10-CM

## 2020-10-06 DIAGNOSIS — Z11.4 ENCOUNTER FOR SCREENING FOR HIV: ICD-10-CM

## 2020-10-06 DIAGNOSIS — N18.1 CHRONIC RENAL IMPAIRMENT, STAGE 1: ICD-10-CM

## 2020-10-06 DIAGNOSIS — K59.00 CONSTIPATION, UNSPECIFIED CONSTIPATION TYPE: Chronic | ICD-10-CM

## 2020-10-06 DIAGNOSIS — E29.1 HYPOGONADISM MALE: ICD-10-CM

## 2020-10-06 DIAGNOSIS — I10 HYPERTENSION, UNSPECIFIED TYPE: ICD-10-CM

## 2020-10-06 DIAGNOSIS — Z11.59 NEED FOR HEPATITIS C SCREENING TEST: ICD-10-CM

## 2020-10-06 DIAGNOSIS — M50.30 DDD (DEGENERATIVE DISC DISEASE), CERVICAL: ICD-10-CM

## 2020-10-06 DIAGNOSIS — R32 URINARY INCONTINENCE, UNSPECIFIED TYPE: ICD-10-CM

## 2020-10-06 DIAGNOSIS — G89.4 CHRONIC PAIN SYNDROME: Primary | ICD-10-CM

## 2020-10-06 DIAGNOSIS — F32.A DEPRESSION, UNSPECIFIED DEPRESSION TYPE: ICD-10-CM

## 2020-10-06 DIAGNOSIS — F41.9 ANXIETY: ICD-10-CM

## 2020-10-06 DIAGNOSIS — M51.36 DDD (DEGENERATIVE DISC DISEASE), LUMBAR: ICD-10-CM

## 2020-10-06 DIAGNOSIS — E78.5 HYPERLIPIDEMIA, UNSPECIFIED HYPERLIPIDEMIA TYPE: ICD-10-CM

## 2020-10-06 DIAGNOSIS — H61.22 IMPACTED CERUMEN OF LEFT EAR: ICD-10-CM

## 2020-10-06 DIAGNOSIS — E66.9 OBESITY, UNSPECIFIED CLASSIFICATION, UNSPECIFIED OBESITY TYPE, UNSPECIFIED WHETHER SERIOUS COMORBIDITY PRESENT: ICD-10-CM

## 2020-10-06 PROCEDURE — 3078F PR MOST RECENT DIASTOLIC BLOOD PRESSURE < 80 MM HG: ICD-10-PCS | Mod: CPTII,S$GLB,, | Performed by: FAMILY MEDICINE

## 2020-10-06 PROCEDURE — 3078F DIAST BP <80 MM HG: CPT | Mod: CPTII,S$GLB,, | Performed by: FAMILY MEDICINE

## 2020-10-06 PROCEDURE — 99999 PR PBB SHADOW E&M-EST. PATIENT-LVL IV: CPT | Mod: PBBFAC,,, | Performed by: FAMILY MEDICINE

## 2020-10-06 PROCEDURE — 99214 PR OFFICE/OUTPT VISIT, EST, LEVL IV, 30-39 MIN: ICD-10-PCS | Mod: S$GLB,,, | Performed by: FAMILY MEDICINE

## 2020-10-06 PROCEDURE — 3074F PR MOST RECENT SYSTOLIC BLOOD PRESSURE < 130 MM HG: ICD-10-PCS | Mod: CPTII,S$GLB,, | Performed by: FAMILY MEDICINE

## 2020-10-06 PROCEDURE — 3074F SYST BP LT 130 MM HG: CPT | Mod: CPTII,S$GLB,, | Performed by: FAMILY MEDICINE

## 2020-10-06 PROCEDURE — 3008F PR BODY MASS INDEX (BMI) DOCUMENTED: ICD-10-PCS | Mod: CPTII,S$GLB,, | Performed by: FAMILY MEDICINE

## 2020-10-06 PROCEDURE — 3008F BODY MASS INDEX DOCD: CPT | Mod: CPTII,S$GLB,, | Performed by: FAMILY MEDICINE

## 2020-10-06 PROCEDURE — 99214 OFFICE O/P EST MOD 30 MIN: CPT | Mod: S$GLB,,, | Performed by: FAMILY MEDICINE

## 2020-10-06 PROCEDURE — 99999 PR PBB SHADOW E&M-EST. PATIENT-LVL IV: ICD-10-PCS | Mod: PBBFAC,,, | Performed by: FAMILY MEDICINE

## 2020-10-06 RX ORDER — MUPIROCIN 20 MG/G
OINTMENT TOPICAL 3 TIMES DAILY
Qty: 22 G | Refills: 1 | Status: SHIPPED | OUTPATIENT
Start: 2020-10-06 | End: 2020-12-09

## 2020-10-06 RX ORDER — DOXYCYCLINE 100 MG/1
100 CAPSULE ORAL EVERY 12 HOURS
Qty: 20 CAPSULE | Refills: 0 | Status: SHIPPED | OUTPATIENT
Start: 2020-10-06 | End: 2020-10-16

## 2020-10-06 NOTE — PROGRESS NOTES
Subjective:       Patient ID: Collin Almaguer Sr. is a 55 y.o. male.    Chief Complaint: Ear Fullness    HPI:  55 year white male -- patient took a shower 2020--after getting out of the shower was unable to hear his son when he talked--in left ear.  No problems with ear in the past---no fever--no runny stuffy nose--no sore throat--would hear a popping noise in the ear.  Patient did not try to popping ears or chew gum.  Ft acute hip for finger in the ear.  Has a history of swimmer's ear--but never had at where unable here out of the ear the next day    ROS:  Skin: no psoriasis, eczema, skin cancer--Skin Cancer left cheek--Dr Wise-has seborrheic keratosis right zygomatic--patient has several follicular abscess type lesions in the anterior chest approximately 8-12--probably due to testosterone injections  HEENT: +occs  headache, ocular pain, blurred vision, diplopia, epistaxis, hoarseness change in voice, thyroid trouble +glaucoma itis bilaterally left greater than right  Lung: No pneumonia, asthma, Tb, wheezing, SOB, no smoking  Heart: no chest pain,no ankle edema, palpitations, MI, tiffanie murmur,+hypertension,+ hyperlipidemia--no stent bypass arrhythmia  Abdomen: no nausea, no  vomiting, diarrhea,+ constipation-a lot better no  ulcers, hepatitis, gallbladder disease, melena, hematochezia, hematemesis   : no UTI, renal disease, stones prostate  MS: no fractures, O/A, lupus, rheumatoid, gout--neck and back problestabl now comes and goes   Neuro: no Dizziness, no  LOC, seizures   No diabetes, no anemia, +anxiety, + depression on meds Disabled  --being home all the time makes patient depressed--found father is at home  2014  , 5 children, disability lives alone        Objective:   Physical Exam:  General: Well nourished, well developed, no acute distress + obesity  Skin:  Follicular lesions abd and arms legs back--pruritic    HEENT: Eyes PERRLA, EOM intact, wax blocking left  ear canal nose patent, throat non-erythematous ears TMs clear  NECK: Supple, no bruits, No JVD, no nodes  Breast bilateral breast nodules   Lungs: Clear, no rales, rhonchi, wheezing  Heart: Regular rate and rhythm, no murmurs, gallops, or rubs  Abdomen: flat, bowel sounds positive, no tenderness, or organomegaly slight ventral hernia between xiphoid and the umbilicus history tenderness right lower quadrant none now  Genitalia circumcised no hernia tests normal  Rectal exam negative   MS: Range of motion and muscle strength intact -- problems with cervical and lumbar spine --obesity--decrease anterior flexion 10° extension 10° lateral flexion rotation 10° straight leg lift pulling sensation scar in the mid back  Neuro: Alert, CN intact, oriented X 3 Romberg negative heel-toe slight swaying  Extremities: No cyanosis, clubbing, or edema         Assessment:       1. Chronic pain syndrome    2. Impacted cerumen of left ear    3. DDD (degenerative disc disease), cervical    4. DDD (degenerative disc disease), lumbar    5. Anxiety    6. Depression, unspecified depression type    7. Glaucoma, unspecified glaucoma type, unspecified laterality    8. Chronic renal impairment, stage 1    9. Hypogonadism male    10. Obesity, unspecified classification, unspecified obesity type, unspecified whether serious comorbidity present    11. Urinary incontinence, unspecified type    12. Constipation, unspecified constipation type    13. Post laminectomy syndrome    14. Hypertension, unspecified type    15. Hyperlipidemia, unspecified hyperlipidemia type    16. Need for hepatitis C screening test    17. Encounter for screening for HIV        Plan:       Chronic pain syndrome    Impacted cerumen of left ear    DDD (degenerative disc disease), cervical    DDD (degenerative disc disease), lumbar    Anxiety  -     T4, Free; Future; Expected date: 01/06/2021  -     TSH; Future; Expected date: 01/06/2021    Depression, unspecified depression  type  -     T4, Free; Future; Expected date: 01/06/2021  -     TSH; Future; Expected date: 01/06/2021    Glaucoma, unspecified glaucoma type, unspecified laterality    Chronic renal impairment, stage 1    Hypogonadism male    Obesity, unspecified classification, unspecified obesity type, unspecified whether serious comorbidity present    Urinary incontinence, unspecified type    Constipation, unspecified constipation type    Post laminectomy syndrome    Hypertension, unspecified type  -     CBC auto differential; Future; Expected date: 01/06/2021  -     Comprehensive Metabolic Panel; Future; Expected date: 01/06/2021    Hyperlipidemia, unspecified hyperlipidemia type  -     Lipid Panel; Future; Expected date: 01/06/2021    Need for hepatitis C screening test  -     Hepatitis C Antibody; Future; Expected date: 10/06/2020    Encounter for screening for HIV  -     HIV 1/2 Ag/Ab (4th Gen); Future; Expected date: 10/06/2020        Decreased hearing left ear--cerumen impaction--unable to visualize ear canal  Chronic pain clinic--had epidural steroid injection cause some problems with the left leg and inability to straight back so no longer getting epidural steroid injections--still goes to pain clinic gets Norco and yoga and stretching exercises  Skin lesion left zygomatic area moles told to see dermatologist  Hypertension/hyperlipidemia Vasotec and vasoretic  for blood pressure on  Lipitor for cholesterol--needs to exercise as tolerated try to get to ideal body weight--Needs arm blood pressure cuff check blood pressure daily Needs blood pressure be 140/90 or less and greater than 90/60  History hypogonadism has been on testosterone now with folliculitis so the lesions on the anterior chest Multiple medical issues glaucoma/constipation/obesity/anxiety/depression  Lab --needs CBCs CMP lipid q.6 months--needs testosterone level yearly  Needs to see neurosurgeon to evaluate lower back  Health maintenance hepatitis C HIV  pneumococcal vaccine shingles colorectal screen flu health maintenance HIV pneumococcal shingles:    Lab done in January no  New Lab due

## 2020-10-06 NOTE — PROGRESS NOTES
Patient ID by name and . Left ear cerumen impaction. Removal done with curette and irrigated with warm water. Patient tolerated well. Tympanic membrane visualized.

## 2020-10-30 ENCOUNTER — PATIENT MESSAGE (OUTPATIENT)
Dept: ADMINISTRATIVE | Facility: HOSPITAL | Age: 55
End: 2020-10-30

## 2020-11-14 ENCOUNTER — PATIENT MESSAGE (OUTPATIENT)
Dept: PRIMARY CARE CLINIC | Facility: CLINIC | Age: 55
End: 2020-11-14

## 2020-11-16 ENCOUNTER — PATIENT MESSAGE (OUTPATIENT)
Dept: PRIMARY CARE CLINIC | Facility: CLINIC | Age: 55
End: 2020-11-16

## 2020-11-24 ENCOUNTER — TELEPHONE (OUTPATIENT)
Dept: PRIMARY CARE CLINIC | Facility: CLINIC | Age: 55
End: 2020-11-24

## 2020-11-24 NOTE — TELEPHONE ENCOUNTER
----- Message from Olga Ludwig sent at 11/24/2020  2:58 PM CST -----  Regarding: Pt [phone]  He wants a call to be seen in the office today or before 12/10/20 if possible, he doesn't want to schedule the Virtual audio only.    Thank you

## 2020-11-25 ENCOUNTER — OFFICE VISIT (OUTPATIENT)
Dept: PRIMARY CARE CLINIC | Facility: CLINIC | Age: 55
End: 2020-11-25
Payer: MEDICARE

## 2020-11-25 VITALS
WEIGHT: 279.88 LBS | TEMPERATURE: 99 F | HEART RATE: 104 BPM | OXYGEN SATURATION: 95 % | RESPIRATION RATE: 18 BRPM | SYSTOLIC BLOOD PRESSURE: 138 MMHG | DIASTOLIC BLOOD PRESSURE: 64 MMHG | BODY MASS INDEX: 42.42 KG/M2 | HEIGHT: 68 IN

## 2020-11-25 DIAGNOSIS — Z11.59 NEED FOR HEPATITIS C SCREENING TEST: ICD-10-CM

## 2020-11-25 DIAGNOSIS — M51.36 DDD (DEGENERATIVE DISC DISEASE), LUMBAR: ICD-10-CM

## 2020-11-25 DIAGNOSIS — M94.9 DISORDER OF CARTILAGE, UNSPECIFIED: ICD-10-CM

## 2020-11-25 DIAGNOSIS — K43.9 VENTRAL HERNIA WITHOUT OBSTRUCTION OR GANGRENE: ICD-10-CM

## 2020-11-25 DIAGNOSIS — E66.9 OBESITY, UNSPECIFIED CLASSIFICATION, UNSPECIFIED OBESITY TYPE, UNSPECIFIED WHETHER SERIOUS COMORBIDITY PRESENT: ICD-10-CM

## 2020-11-25 DIAGNOSIS — H40.9 GLAUCOMA, UNSPECIFIED GLAUCOMA TYPE, UNSPECIFIED LATERALITY: ICD-10-CM

## 2020-11-25 DIAGNOSIS — G89.4 CHRONIC PAIN SYNDROME: Primary | ICD-10-CM

## 2020-11-25 DIAGNOSIS — M50.30 DDD (DEGENERATIVE DISC DISEASE), CERVICAL: ICD-10-CM

## 2020-11-25 DIAGNOSIS — Z98.1 HISTORY OF LUMBAR FUSION: ICD-10-CM

## 2020-11-25 DIAGNOSIS — F32.A DEPRESSION, UNSPECIFIED DEPRESSION TYPE: ICD-10-CM

## 2020-11-25 DIAGNOSIS — E78.5 HYPERLIPIDEMIA, UNSPECIFIED HYPERLIPIDEMIA TYPE: ICD-10-CM

## 2020-11-25 DIAGNOSIS — I10 HYPERTENSION, UNSPECIFIED TYPE: ICD-10-CM

## 2020-11-25 DIAGNOSIS — Z11.4 ENCOUNTER FOR SCREENING FOR HIV: ICD-10-CM

## 2020-11-25 DIAGNOSIS — F41.9 ANXIETY: ICD-10-CM

## 2020-11-25 DIAGNOSIS — E29.1 HYPOGONADISM MALE: ICD-10-CM

## 2020-11-25 DIAGNOSIS — K62.5 BRIGHT RED BLOOD PER RECTUM: ICD-10-CM

## 2020-11-25 DIAGNOSIS — N18.1 CHRONIC RENAL IMPAIRMENT, STAGE 1: ICD-10-CM

## 2020-11-25 PROCEDURE — 99999 PR PBB SHADOW E&M-EST. PATIENT-LVL V: ICD-10-PCS | Mod: PBBFAC,,, | Performed by: FAMILY MEDICINE

## 2020-11-25 PROCEDURE — 99999 PR PBB SHADOW E&M-EST. PATIENT-LVL V: CPT | Mod: PBBFAC,,, | Performed by: FAMILY MEDICINE

## 2020-11-25 PROCEDURE — 3008F PR BODY MASS INDEX (BMI) DOCUMENTED: ICD-10-PCS | Mod: CPTII,S$GLB,, | Performed by: FAMILY MEDICINE

## 2020-11-25 PROCEDURE — 3078F PR MOST RECENT DIASTOLIC BLOOD PRESSURE < 80 MM HG: ICD-10-PCS | Mod: CPTII,S$GLB,, | Performed by: FAMILY MEDICINE

## 2020-11-25 PROCEDURE — 1125F PR PAIN SEVERITY QUANTIFIED, PAIN PRESENT: ICD-10-PCS | Mod: S$GLB,,, | Performed by: FAMILY MEDICINE

## 2020-11-25 PROCEDURE — 1125F AMNT PAIN NOTED PAIN PRSNT: CPT | Mod: S$GLB,,, | Performed by: FAMILY MEDICINE

## 2020-11-25 PROCEDURE — 3078F DIAST BP <80 MM HG: CPT | Mod: CPTII,S$GLB,, | Performed by: FAMILY MEDICINE

## 2020-11-25 PROCEDURE — 3008F BODY MASS INDEX DOCD: CPT | Mod: CPTII,S$GLB,, | Performed by: FAMILY MEDICINE

## 2020-11-25 PROCEDURE — 3075F PR MOST RECENT SYSTOLIC BLOOD PRESS GE 130-139MM HG: ICD-10-PCS | Mod: CPTII,S$GLB,, | Performed by: FAMILY MEDICINE

## 2020-11-25 PROCEDURE — 3075F SYST BP GE 130 - 139MM HG: CPT | Mod: CPTII,S$GLB,, | Performed by: FAMILY MEDICINE

## 2020-11-25 PROCEDURE — 99214 PR OFFICE/OUTPT VISIT, EST, LEVL IV, 30-39 MIN: ICD-10-PCS | Mod: S$GLB,,, | Performed by: FAMILY MEDICINE

## 2020-11-25 PROCEDURE — 99214 OFFICE O/P EST MOD 30 MIN: CPT | Mod: S$GLB,,, | Performed by: FAMILY MEDICINE

## 2020-11-25 NOTE — PROGRESS NOTES
Subjective:       Patient ID: Collin Almaguer Sr. is a 55 y.o. male.    Chief Complaint: Depression and Back Pain    HPI:  55 year white male --patient complaining of depression--patient is not work in 10 years--has been on disability 8 years--lives alone--difficulty sleeping.      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 Once a year supposed to have evaluation see if tumor growing back--patient had a laminectomy history lumbar facet arthropathy had a tumor removed that he states was benign. Called insurance company -- told see orthopedist.        Pt seen by pain management -- he ordered CT scan of the lumbar. Feels like instumentation affecting hips --told whole body CT scan.  Patient had epidural steroid injection in the past with no relief--trying get whole body scan . Has records what wa done past .  Patient states orthopedist did surgery and [place]  Dr Salomón Wang       Depression --due not working and chronic pain        Needs colonoscopy       Ventral hernia         History of umbilical hernia repair 2016 -2017 --Dr Li         History use blood occasional in between leg feels like it is coming from hemorrhoids           ROS:  Skin: no psoriasis, eczema, skin cancer--Skin Cancer left cheek--Dr Wise-  HEENT: +occs  headache, ocular pain, blurred vision, diplopia, epistaxis, hoarseness change in voice, thyroid trouble +glaucoma itis bilaterally left greater than right  Lung: No pneumonia, asthma, Tb, wheezing, SOB, no smoking  Heart: no chest pain,no ankle edema, palpitations, MI, tiffanie murmur,+hypertension,+ hyperlipidemia--no stent bypass arrhythmia  Abdomen: no nausea, no  vomiting, diarrhea,+ constipation-a lot better no  ulcers, hepatitis, gallbladder disease, melena, hematochezia, hematemesis   : no UTI, renal disease,  stones prostate  MS: no fractures, O/A, lupus, rheumatoid, gout--neck and back problestabl now comes and goes   Neuro: no Dizziness, no  LOC, seizures   No diabetes, no anemia, +anxiety, + depression on meds Disabled  --being home all the time makes patient depressed--found father is at home  2014  , 5 children, disability lives alone        Objective:   Physical Exam:  General: Well nourished, well developed, no acute distress + obesity  Skin:  Follicular lesions abd and arms legs back--pruritic    HEENT: Eyes PERRLA, EOM intact, wax blocking left ear canal nose patent, throat non-erythematous ears TMs clear  NECK: Supple, no bruits, No JVD, no nodes  Breast bilateral breast nodules   Lungs: Clear, no rales, rhonchi, wheezing  Heart: Regular rate and rhythm, no murmurs, gallops, or rubs  Abdomen: flat, bowel sounds positive, no tenderness, or organomegaly some slight ventral herniation--mild--some complaints of right costal angle swelling that comes and goes suggestive of hernia  Genitalia circumcised no hernia tests normal  Rectal exam negative   MS: Range of motion and muscle strength intact -- problems with cervical and lumbar spine --obesity--decrease anterior flexion 10° extension 10° lateral flexion rotation 10° straight leg lift pulling sensation scar in the mid back  Neuro: Alert, CN intact, oriented X 3 Romberg negative heel-toe slight swaying  Extremities: No cyanosis, clubbing, or edema         Assessment:       1. Chronic pain syndrome    2. DDD (degenerative disc disease), lumbar    3. DDD (degenerative disc disease), cervical    4. Anxiety    5. Depression, unspecified depression type    6. Glaucoma, unspecified glaucoma type, unspecified laterality    7. Hypertension, unspecified type    8. Hyperlipidemia, unspecified hyperlipidemia type    9. Chronic renal impairment, stage 1    10. Hypogonadism male    11. Obesity, unspecified classification, unspecified obesity type,  unspecified whether serious comorbidity present    12. Ventral hernia without obstruction or gangrene    13. Bright red blood per rectum    14. History of lumbar fusion    15. Need for hepatitis C screening test    16. Encounter for screening for HIV    17. Disorder of cartilage, unspecified         Plan:       Chronic pain syndrome  -     T4, Free; Future; Expected date: 11/25/2020  -     TSH; Future; Expected date: 11/25/2020    DDD (degenerative disc disease), lumbar  -     T4, Free; Future; Expected date: 11/25/2020  -     TSH; Future; Expected date: 11/25/2020  -     CT Lumbar Spine Without Contrast; Future; Expected date: 11/25/2020    DDD (degenerative disc disease), cervical  -     T4, Free; Future; Expected date: 11/25/2020  -     TSH; Future; Expected date: 11/25/2020    Anxiety  -     T4, Free; Future; Expected date: 11/25/2020  -     TSH; Future; Expected date: 11/25/2020    Depression, unspecified depression type  -     T4, Free; Future; Expected date: 11/25/2020  -     TSH; Future; Expected date: 11/25/2020  -     Ambulatory referral/consult to Psychiatry; Future; Expected date: 12/02/2020    Glaucoma, unspecified glaucoma type, unspecified laterality  -     T4, Free; Future; Expected date: 11/25/2020  -     TSH; Future; Expected date: 11/25/2020    Hypertension, unspecified type  -     CBC Auto Differential; Future; Expected date: 11/25/2020  -     Comprehensive Metabolic Panel; Future; Expected date: 11/25/2020  -     POCT urine dipstick without microscope  -     Fecal Immunochemical Test (iFOBT); Future; Expected date: 11/25/2020  -     Vitamin D; Future; Expected date: 11/25/2020  -     T4, Free; Future; Expected date: 11/25/2020  -     TSH; Future; Expected date: 11/25/2020    Hyperlipidemia, unspecified hyperlipidemia type  -     Lipid Panel; Future; Expected date: 11/25/2020  -     T4, Free; Future; Expected date: 11/25/2020  -     TSH; Future; Expected date: 11/25/2020    Chronic renal  impairment, stage 1  -     T4, Free; Future; Expected date: 11/25/2020  -     TSH; Future; Expected date: 11/25/2020    Hypogonadism male  -     T4, Free; Future; Expected date: 11/25/2020  -     TSH; Future; Expected date: 11/25/2020    Obesity, unspecified classification, unspecified obesity type, unspecified whether serious comorbidity present  -     T4, Free; Future; Expected date: 11/25/2020  -     TSH; Future; Expected date: 11/25/2020    Ventral hernia without obstruction or gangrene  -     T4, Free; Future; Expected date: 11/25/2020  -     TSH; Future; Expected date: 11/25/2020  -     Ambulatory referral/consult to General Surgery; Future; Expected date: 12/02/2020    Bright red blood per rectum  -     T4, Free; Future; Expected date: 11/25/2020  -     TSH; Future; Expected date: 11/25/2020  -     Ambulatory referral/consult to Colorectal Surgery; Future; Expected date: 12/02/2020    History of lumbar fusion  -     T4, Free; Future; Expected date: 11/25/2020  -     TSH; Future; Expected date: 11/25/2020    Need for hepatitis C screening test  -     Hepatitis C Antibody; Future; Expected date: 11/25/2020    Encounter for screening for HIV  -     HIV 1/2 Ag/Ab (4th Gen); Future; Expected date: 11/25/2020    Disorder of cartilage, unspecified   -     Vitamin D; Future; Expected date: 11/25/2020        Multiple medical issues  Chronic back pain--sees a pain physician--ordered CT scan of the lumbar spine---patient wanted CT scan of the whole body--told was not appropriate should get disorder from the pain doctor if he feels that it is necessary--will order CT scan lumbar spine has had multiple fusion CT scan from 2016 showed multiple areas of fusion but all alignment in [place] were basically normal--patient was to get rid of back pain [place] able go to work as a work-in 10 years  Abdominal discomfort right costal angle describing possible hernia does have a ventral hernia patient has protuberant abdomen told  needs to lose weight will get CT scan abdomen and pelvis due to bright red blood per rectum and abdominal pain right upper quadrant area of possible hernia--needs to see a general surgeon for evaluation once CT scan colonoscopy complete  Depression--needs to see a psychiatrist--multiple issues that need to be addressed should be on medication for depression  Hyperlipidemia patient on Lipitor tri cor  Decreased hearing left ear--cerumen impaction--unable to visualize ear canal  Hypertension/hyperlipidemia Vasotec and vasoretic  for blood pressure on  Lipitor for cholesterol--needs to exercise as tolerated try to get to ideal body weight--Needs arm blood pressure cuff check blood pressure daily Needs blood pressure be 140/90 or less and greater than 90/60  History hypogonadism has been on testosterone now with folliculitis so the lesions on the anterior chest Multiple medical issues glaucoma/constipation/obesity/anxiety/depression  Lab --needs CBCs CMP lipid q.6 months--needs testosterone level yearly--needs lb now   Needs to see neurosurgeon to evaluate lower back  Health maintenance hepatitis C HIV pneumococcal vaccine shingles colorectal screen flu

## 2020-11-27 ENCOUNTER — TELEPHONE (OUTPATIENT)
Dept: PRIMARY CARE CLINIC | Facility: CLINIC | Age: 55
End: 2020-11-27

## 2020-11-28 ENCOUNTER — PATIENT OUTREACH (OUTPATIENT)
Dept: ADMINISTRATIVE | Facility: OTHER | Age: 55
End: 2020-11-28

## 2020-11-28 NOTE — PROGRESS NOTES
LINKS Immunization: Patient not found  Health Maintenance: updated  Care Everywhere: updated  Chart reviewed for overdue Proactive [facility] Encounters (CRS, Breast Ca, Diabetic Eye Exam) health maintenance testing  Orders entered:  N/A

## 2020-12-01 ENCOUNTER — OFFICE VISIT (OUTPATIENT)
Dept: SURGERY | Facility: CLINIC | Age: 55
End: 2020-12-01
Payer: MEDICARE

## 2020-12-01 ENCOUNTER — TELEPHONE (OUTPATIENT)
Dept: SURGERY | Facility: CLINIC | Age: 55
End: 2020-12-01

## 2020-12-01 VITALS
RESPIRATION RATE: 16 BRPM | SYSTOLIC BLOOD PRESSURE: 139 MMHG | BODY MASS INDEX: 41.1 KG/M2 | WEIGHT: 271.19 LBS | OXYGEN SATURATION: 96 % | DIASTOLIC BLOOD PRESSURE: 91 MMHG | HEIGHT: 68 IN | HEART RATE: 117 BPM

## 2020-12-01 DIAGNOSIS — K43.9 VENTRAL HERNIA WITHOUT OBSTRUCTION OR GANGRENE: ICD-10-CM

## 2020-12-01 PROCEDURE — 3075F PR MOST RECENT SYSTOLIC BLOOD PRESS GE 130-139MM HG: ICD-10-PCS | Mod: CPTII,S$GLB,, | Performed by: SURGERY

## 2020-12-01 PROCEDURE — 3075F SYST BP GE 130 - 139MM HG: CPT | Mod: CPTII,S$GLB,, | Performed by: SURGERY

## 2020-12-01 PROCEDURE — 1125F PR PAIN SEVERITY QUANTIFIED, PAIN PRESENT: ICD-10-PCS | Mod: S$GLB,,, | Performed by: SURGERY

## 2020-12-01 PROCEDURE — 1125F AMNT PAIN NOTED PAIN PRSNT: CPT | Mod: S$GLB,,, | Performed by: SURGERY

## 2020-12-01 PROCEDURE — 99999 PR PBB SHADOW E&M-EST. PATIENT-LVL V: ICD-10-PCS | Mod: PBBFAC,,, | Performed by: SURGERY

## 2020-12-01 PROCEDURE — 3080F PR MOST RECENT DIASTOLIC BLOOD PRESSURE >= 90 MM HG: ICD-10-PCS | Mod: CPTII,S$GLB,, | Performed by: SURGERY

## 2020-12-01 PROCEDURE — 3080F DIAST BP >= 90 MM HG: CPT | Mod: CPTII,S$GLB,, | Performed by: SURGERY

## 2020-12-01 PROCEDURE — 99204 PR OFFICE/OUTPT VISIT, NEW, LEVL IV, 45-59 MIN: ICD-10-PCS | Mod: S$GLB,,, | Performed by: SURGERY

## 2020-12-01 PROCEDURE — 99999 PR PBB SHADOW E&M-EST. PATIENT-LVL V: CPT | Mod: PBBFAC,,, | Performed by: SURGERY

## 2020-12-01 PROCEDURE — 3008F PR BODY MASS INDEX (BMI) DOCUMENTED: ICD-10-PCS | Mod: CPTII,S$GLB,, | Performed by: SURGERY

## 2020-12-01 PROCEDURE — 99204 OFFICE O/P NEW MOD 45 MIN: CPT | Mod: S$GLB,,, | Performed by: SURGERY

## 2020-12-01 PROCEDURE — 3008F BODY MASS INDEX DOCD: CPT | Mod: CPTII,S$GLB,, | Performed by: SURGERY

## 2020-12-01 NOTE — LETTER
December 3, 2020      Jaret Armendariz MD  [address] [place] [address]           [facility] [address]  [address]  [place] [address]  Phone: [phone]  Fax: [phone]          Patient: Collin Almaguer Sr.   MR Number: [id number]   YOB: 1965   Date of Visit: 12/1/2020       Dear Dr. Jaret Armendariz:    Thank you for referring Collin Almaguer to me for evaluation. Attached you will find relevant portions of my assessment and plan of care.    If you have questions, please do not hesitate to call me. I look forward to following Collin Almaguer along with you.    Sincerely,    Hermann Rivas MD    Enclosure  CC:  No Recipients    If you would like to receive this communication electronically, please contact [email or web address] or [phone] to request more information on [facility] access.    For providers and/or their staff who would like to refer a patient to [facility], please contact us through our one-stop-shop provider referral line, [facility], at [phone].    If you feel you have received this communication in error or would no longer like to receive these types of communications, please e-mail [email or web address]

## 2020-12-01 NOTE — TELEPHONE ENCOUNTER
"----- Message from Oliver Pinon sent at 12/1/2020  3:04 PM CST -----   Consult/Advisory:    Name Of Caller: Collin   Contact Preference?: [phone]    Does patient feel the need to be seen today? No     What is the nature of the call?:  -pt request a callback regarding the 2 lumps on the left and right upper stomach.     Additional Notes:  "Thank you for all that you do for our patients'"    "

## 2020-12-01 NOTE — TELEPHONE ENCOUNTER
Returned call to the patient; answered questions he had from his visit today. Patient instructed to call with any questions; patient voiced understanding.

## 2020-12-02 ENCOUNTER — PATIENT MESSAGE (OUTPATIENT)
Dept: SURGERY | Facility: CLINIC | Age: 55
End: 2020-12-02

## 2020-12-02 DIAGNOSIS — Z12.11 SCREENING FOR COLON CANCER: Primary | ICD-10-CM

## 2020-12-02 RX ORDER — SODIUM CHLORIDE 0.9 % (FLUSH) 0.9 %
3 SYRINGE (ML) INJECTION
Status: CANCELLED | OUTPATIENT
Start: 2020-12-02

## 2020-12-02 RX ORDER — SODIUM CHLORIDE, SODIUM LACTATE, POTASSIUM CHLORIDE, CALCIUM CHLORIDE 600; 310; 30; 20 MG/100ML; MG/100ML; MG/100ML; MG/100ML
INJECTION, SOLUTION INTRAVENOUS CONTINUOUS
Status: CANCELLED | OUTPATIENT
Start: 2020-12-02

## 2020-12-03 NOTE — PROGRESS NOTES
History & Physical    SUBJECTIVE:     History of Present Illness:  Patient is a 55 y.o. male presents with concern for possible hernias.  States that he gets a bulge on both upper abdominal areas below his rib cage.  He had a CT scan which did not show any hernias in this area.  Also concern he may have a recurrent hernia near his umbilicus, however on CT scan this looks like is well-healed there is no sign of recurrence.  States he gets some pain in the mid abdomen.  Also concerned about his epigastric area because when he sits up he notices a bulge there as well.  I explained to them did that this is diastasis recti and is due to him gaining weight and having his rectus muscles separate just having only fashion this area.  Told him that weight loss and exercise could help with this but that surgical intervention is not a great option.  Patient also has back pain which she states is from a injury he sustained.  At this point I do not recommend any surgical intervention for this patient as his abdominal pain is not due to a hernia.      Chief Complaint   Patient presents with    Hernia       Review of patient's allergies indicates:   Allergen Reactions    Fish containing products      TUNA FISH    Pcn [penicillins]        Current Outpatient Medications   Medication Sig Dispense Refill    atorvastatin (LIPITOR) 20 MG tablet Take 1 tablet (20 mg total) by mouth once daily. 90 tablet 3    cyclobenzaprine (FLEXERIL) 10 MG tablet Take 1 tablet by mouth nightly as needed.      enalapril (VASOTEC) 10 MG tablet Take 10 mg by mouth once daily.      enalapril-hydrochlorothiazide (VASERETIC) 10-25 mg per tablet Take 1 tablet by mouth every morning.      fenofibrate (TRICOR) 145 MG tablet Take 1 tablet by mouth once.      gabapentin (NEURONTIN) 800 MG tablet Take 1 tablet by mouth 2 (two) times daily.      HYDROcodone-acetaminophen (NORCO)  mg per tablet Take 1 tablet by mouth every 8 (eight) hours as needed.       ibuprofen (ADVIL,MOTRIN) 800 MG tablet Take 1 tablet by mouth every 6 (six) hours as needed.      latanoprost 0.005 % ophthalmic solution Place 1 drop into both eyes every evening.      oxybutynin (DITROPAN-XL) 10 MG 24 hr tablet       sertraline (ZOLOFT) 100 MG tablet Take 1 tablet by mouth once daily.      testosterone cypionate (DEPOTESTOTERONE CYPIONATE) 200 mg/mL injection       aspirin-acetaminophen-caffeine 250-250-65 mg (EXCEDRIN MIGRAINE) 250-250-65 mg per tablet Take 1 tablet by mouth every 6 (six) hours as needed.      betamethasone valerate 0.1% (VALISONE) 0.1 % Crea Apply topically 2 (two) times daily. 60 g 2    diazePAM (VALIUM) 10 MG Tab       hydroxyzine HCL (ATARAX) 25 MG tablet Take 1 tablet (25 mg total) by mouth 3 (three) times daily. (Patient not taking: Reported on 12/1/2020) 30 tablet 1    loratadine (CLARITIN) 10 mg tablet Take 1 tablet (10 mg total) by mouth once daily. (Patient not taking: Reported on 12/1/2020) 30 tablet 5    methylPREDNISolone (MEDROL DOSEPACK) 4 mg tablet use as directed (Patient not taking: Reported on 12/1/2020) 1 Package 0    mupirocin (BACTROBAN) 2 % ointment Apply topically 3 (three) times daily. (Patient not taking: Reported on 12/1/2020) 22 g 1     No current facility-administered medications for this visit.        Past Medical History:   Diagnosis Date    Depression     Hyperlipemia     Hypertension      Past Surgical History:   Procedure Laterality Date    LAMINECTOMY       History reviewed. No pertinent family history.  Social History     Tobacco Use    Smoking status: Never Smoker    Smokeless tobacco: Never Used   Substance Use Topics    Alcohol use: No    Drug use: No        Review of Systems:  Review of Systems   Constitutional: Negative for appetite change, fatigue, fever and unexpected weight change.   HENT: Negative for sore throat and trouble swallowing.    Eyes: Negative.    Respiratory: Negative for cough, shortness of breath  "and wheezing.    Cardiovascular: Negative for chest pain and leg swelling.   Gastrointestinal: Positive for abdominal pain (Mild mid abdomen). Negative for abdominal distention, blood in stool, constipation, diarrhea, nausea and vomiting.   Endocrine: Negative.    Genitourinary: Negative.    Musculoskeletal: Negative for back pain.   Skin: Negative.  Negative for rash.   Allergic/Immunologic: Negative.    Neurological: Negative.    Hematological: Negative.    Psychiatric/Behavioral: Negative for confusion.       OBJECTIVE:     Vital Signs (Most Recent)  Pulse: (!) 117 (12/01/20 1515)  Resp: 16 (12/01/20 1515)  BP: (!) 139/91 (12/01/20 1515)  SpO2: 96 % (12/01/20 1515)  5' 8" (1.727 m)  123 kg (271 lb 2.7 oz)     Physical Exam:  Physical Exam  Vitals signs and nursing note reviewed.   Constitutional:       Appearance: He is well-developed.   HENT:      Head: Normocephalic and atraumatic.   Neck:      Musculoskeletal: Normal range of motion.   Cardiovascular:      Rate and Rhythm: Normal rate.      Heart sounds: Normal heart sounds.   Pulmonary:      Effort: Pulmonary effort is normal.   Abdominal:      General: Bowel sounds are normal. There is no distension.      Palpations: Abdomen is soft.      Tenderness: There is no abdominal tenderness.   Musculoskeletal: Normal range of motion.   Skin:     General: Skin is warm and dry.      Capillary Refill: Capillary refill takes less than 2 seconds.          Neurological:      Mental Status: He is alert and oriented to person, place, and time.   Psychiatric:         Behavior: Behavior normal.         Laboratory  CBC: Reviewed  CMP: Reviewed    Diagnostic Results:  CT: Reviewed  No sign ventral hernia.  Very small fat containing inguinal hernias    ASSESSMENT/PLAN:     55-year-old male with small fat containing inguinal hernias, diastasis recti.    Recommend observation for now.  Explain the patient should attempt to lose some weight.  This to be the patient's best option is " even if he were to need any type of surgical intervention he is morbidly obese and would have a high risk for any complications or recurrences.

## 2020-12-07 ENCOUNTER — PATIENT MESSAGE (OUTPATIENT)
Dept: SURGERY | Facility: CLINIC | Age: 55
End: 2020-12-07

## 2020-12-09 ENCOUNTER — OFFICE VISIT (OUTPATIENT)
Dept: PRIMARY CARE CLINIC | Facility: CLINIC | Age: 55
End: 2020-12-09
Payer: MEDICARE

## 2020-12-09 ENCOUNTER — TELEPHONE (OUTPATIENT)
Dept: NEUROSURGERY | Facility: CLINIC | Age: 55
End: 2020-12-09

## 2020-12-09 VITALS
WEIGHT: 269.44 LBS | BODY MASS INDEX: 40.84 KG/M2 | DIASTOLIC BLOOD PRESSURE: 74 MMHG | HEIGHT: 68 IN | HEART RATE: 100 BPM | RESPIRATION RATE: 17 BRPM | TEMPERATURE: 98 F | OXYGEN SATURATION: 97 % | SYSTOLIC BLOOD PRESSURE: 110 MMHG

## 2020-12-09 DIAGNOSIS — E66.01 MORBID OBESITY WITH BMI OF 40.0-44.9, ADULT: ICD-10-CM

## 2020-12-09 DIAGNOSIS — K43.9 VENTRAL HERNIA WITHOUT OBSTRUCTION OR GANGRENE: ICD-10-CM

## 2020-12-09 DIAGNOSIS — M54.50 LOW BACK PAIN, UNSPECIFIED BACK PAIN LATERALITY, UNSPECIFIED CHRONICITY, UNSPECIFIED WHETHER SCIATICA PRESENT: Primary | ICD-10-CM

## 2020-12-09 DIAGNOSIS — I10 HYPERTENSION, UNSPECIFIED TYPE: ICD-10-CM

## 2020-12-09 DIAGNOSIS — N20.0 BILATERAL NEPHROLITHIASIS: ICD-10-CM

## 2020-12-09 DIAGNOSIS — M96.1 POST LAMINECTOMY SYNDROME: ICD-10-CM

## 2020-12-09 DIAGNOSIS — Z98.1 HISTORY OF LUMBAR FUSION: ICD-10-CM

## 2020-12-09 DIAGNOSIS — G89.4 CHRONIC PAIN SYNDROME: ICD-10-CM

## 2020-12-09 DIAGNOSIS — M51.36 DDD (DEGENERATIVE DISC DISEASE), LUMBAR: ICD-10-CM

## 2020-12-09 DIAGNOSIS — E78.5 HYPERLIPIDEMIA, UNSPECIFIED HYPERLIPIDEMIA TYPE: ICD-10-CM

## 2020-12-09 DIAGNOSIS — F41.9 ANXIETY: ICD-10-CM

## 2020-12-09 DIAGNOSIS — M48.00 SPINAL STENOSIS, UNSPECIFIED SPINAL REGION: ICD-10-CM

## 2020-12-09 DIAGNOSIS — K57.30 DIVERTICULOSIS OF COLON: ICD-10-CM

## 2020-12-09 DIAGNOSIS — E29.1 HYPOGONADISM MALE: Primary | ICD-10-CM

## 2020-12-09 DIAGNOSIS — F32.A DEPRESSION, UNSPECIFIED DEPRESSION TYPE: ICD-10-CM

## 2020-12-09 PROBLEM — Z87.19 HISTORY OF DIVERTICULOSIS: Status: RESOLVED | Noted: 2020-01-24 | Resolved: 2020-12-09

## 2020-12-09 PROCEDURE — 3074F PR MOST RECENT SYSTOLIC BLOOD PRESSURE < 130 MM HG: ICD-10-PCS | Mod: CPTII,S$GLB,, | Performed by: FAMILY MEDICINE

## 2020-12-09 PROCEDURE — 3008F PR BODY MASS INDEX (BMI) DOCUMENTED: ICD-10-PCS | Mod: CPTII,S$GLB,, | Performed by: FAMILY MEDICINE

## 2020-12-09 PROCEDURE — 99999 PR PBB SHADOW E&M-EST. PATIENT-LVL IV: CPT | Mod: PBBFAC,,, | Performed by: FAMILY MEDICINE

## 2020-12-09 PROCEDURE — 99214 PR OFFICE/OUTPT VISIT, EST, LEVL IV, 30-39 MIN: ICD-10-PCS | Mod: S$GLB,,, | Performed by: FAMILY MEDICINE

## 2020-12-09 PROCEDURE — 99214 OFFICE O/P EST MOD 30 MIN: CPT | Mod: S$GLB,,, | Performed by: FAMILY MEDICINE

## 2020-12-09 PROCEDURE — 3078F PR MOST RECENT DIASTOLIC BLOOD PRESSURE < 80 MM HG: ICD-10-PCS | Mod: CPTII,S$GLB,, | Performed by: FAMILY MEDICINE

## 2020-12-09 PROCEDURE — 99999 PR PBB SHADOW E&M-EST. PATIENT-LVL IV: ICD-10-PCS | Mod: PBBFAC,,, | Performed by: FAMILY MEDICINE

## 2020-12-09 PROCEDURE — 3078F DIAST BP <80 MM HG: CPT | Mod: CPTII,S$GLB,, | Performed by: FAMILY MEDICINE

## 2020-12-09 PROCEDURE — 3074F SYST BP LT 130 MM HG: CPT | Mod: CPTII,S$GLB,, | Performed by: FAMILY MEDICINE

## 2020-12-09 PROCEDURE — 3008F BODY MASS INDEX DOCD: CPT | Mod: CPTII,S$GLB,, | Performed by: FAMILY MEDICINE

## 2020-12-09 RX ORDER — FENOFIBRATE 145 MG/1
145 TABLET, FILM COATED ORAL ONCE
Qty: 90 TABLET | Refills: 1 | Status: SHIPPED | OUTPATIENT
Start: 2020-12-09 | End: 2021-12-07 | Stop reason: SDUPTHER

## 2020-12-09 RX ORDER — ATORVASTATIN CALCIUM 20 MG/1
20 TABLET, FILM COATED ORAL DAILY
Qty: 90 TABLET | Refills: 1 | Status: SHIPPED | OUTPATIENT
Start: 2020-12-09 | End: 2021-12-15

## 2020-12-09 RX ORDER — ENALAPRIL MALEATE AND HYDROCHLOROTHIAZIDE 10; 25 MG/1; MG/1
1 TABLET ORAL EVERY MORNING
Qty: 90 TABLET | Refills: 1 | Status: SHIPPED | OUTPATIENT
Start: 2020-12-09 | End: 2021-07-27

## 2020-12-09 NOTE — PROGRESS NOTES
Subjective:       Patient ID: Collin Almaguer Sr. is a 55 y.o. male.    Chief Complaint: Results   history of present illness----55-year-old white male in for follow-up CT scan lumbar spine CT scan shows fusion L3-S1 bilateral---have left-sided ilana fracture at levels L5-S1--has degenerative changes moderate spinal canal stenosis L2-3--bilateral nonobstructing nephrolithiasis a--colonic diverticulosis---but has colonoscopy scheduled 01/04/2021 -hepatic steatosis.   Patient has found a disc from his old CAT scans in the past--told needs to bring the disc to the neurosurgeon when he sees them with the findings on the new CT scan done 2013      Pt feels needs ilana repaired--last time patient had to have a tumor removed along with fusion ilana placement in dissectomy      Patient did lose 10 lb since last visit dieting      Pty was going to pain specilist --pt told trying get medciation into back like going thru cheese. Unable strighten back x 6 days --worried may be paralyzed if done again.      History CT scan --colonic diverticulosis--history bright red blood per rectum--colonoscopy schedule in January      History CT scan hepatic steatosis lab pending      History CT scan--bilateral nonobstructing nephrolithiasis told kidney stones nothing needs to be done about these unless they break off and end up in the ureter     Last office visit 11/25/2020--  55 year white male --patient complaining of depression--patient is not work in 10 years--has been on disability 8 years--lives alone--difficulty sleeping.      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 Once a year supposed to have evaluation see if tumor growing back--patient had a laminectomy history lumbar facet arthropathy had a tumor removed that he states was benign. Called insurance company --  told see orthopedist.        Pt seen by pain management -- he ordered CT scan of the lumbar. Feels like instumentation affecting hips --told whole body CT scan.  Patient had epidural steroid injection in the past with no relief--trying get whole body scan . Has records what wa done past .  Patient states orthopedist did surgery and [facility]  Dr Salomón Wang       Depression --due not working and chronic pain        Needs colonoscopy       Ventral hernia         History of umbilical hernia repair  - --Dr Li         History use blood occasional in between leg feels like it is coming from hemorrhoids           ROS:   Not reviewed  --due spent long time on reviewing CT scan    Skin: no psoriasis, eczema, skin cancer--Skin Cancer left cheek--Dr Wise-  HEENT: +occs  headache, ocular pain, blurred vision, diplopia, epistaxis, hoarseness change in voice, thyroid trouble +glaucoma itis bilaterally left greater than right  Lung: No pneumonia, asthma, Tb, wheezing, SOB, no smoking  Heart: no chest pain,no ankle edema, palpitations, MI, tiffanie murmur,+hypertension,+ hyperlipidemia--no stent bypass arrhythmia  Abdomen: no nausea, no  vomiting, diarrhea,+ constipation-a lot better no  ulcers, hepatitis, gallbladder disease, melena, hematochezia, hematemesis   : no UTI, renal disease, stones prostate  MS: no fractures, O/A, lupus, rheumatoid, gout--neck and back problestabl now comes and goes   Neuro: no Dizziness, no  LOC, seizures   No diabetes, no anemia, +anxiety, + depression on meds Disabled  --being home all the time makes patient depressed--found father is at home  2014  , 5 children, disability lives alone        Objective:   Physical Exam:  General: Well nourished, well developed, no acute distress + obesity  Skin:  Follicular lesions abd and arms legs back--pruritic    HEENT: Eyes PERRLA, EOM intact, wax blocking left ear canal nose patent, throat non-erythematous ears TMs  clear  NECK: Supple, no bruits, No JVD, no nodes  Breast bilateral breast nodules   Lungs: Clear, no rales, rhonchi, wheezing  Heart: Regular rate and rhythm, no murmurs, gallops, or rubs  Abdomen: flat, bowel sounds positive, no tenderness, or organomegaly some slight ventral herniation--mild--some complaints of right costal angle swelling that comes and goes suggestive of hernia  Genitalia circumcised no hernia tests normal  Rectal exam negative   MS: Range of motion and muscle strength intact -- problems with cervical and lumbar spine --obesity--decrease anterior flexion 10° extension 10° lateral flexion rotation 10° straight leg lift pulling sensation scar in the mid back---main problem low back pain  Neuro: Alert, CN intact, oriented X 3 Romberg negative heel-toe slight swaying  Extremities: No cyanosis, clubbing, or edema         Assessment:       1. Hypogonadism male    2. DDD (degenerative disc disease), lumbar    3. Chronic pain syndrome    4. History of lumbar fusion    5. Spinal stenosis, unspecified spinal region    6. Bilateral nephrolithiasis    7. Diverticulosis of colon    8. Morbid obesity with BMI of 40.0-44.9, adult    9. Ventral hernia without obstruction or gangrene    10. Post laminectomy syndrome    11. Hypertension, unspecified type    12. Hyperlipidemia, unspecified hyperlipidemia type    13. Anxiety    14. Depression, unspecified depression type        Plan:       Hypogonadism male  -     Testosterone, free; Future; Expected date: 12/09/2020  -     Testosterone, Total, Males; Future; Expected date: 12/09/2020  -     Testosterone, free; Future; Expected date: 12/09/2020    DDD (degenerative disc disease), lumbar    Chronic pain syndrome    History of lumbar fusion  -     Ambulatory referral/consult to Neurosurgery; Future; Expected date: 12/16/2020    Spinal stenosis, unspecified spinal region    Bilateral nephrolithiasis    Diverticulosis of colon    Morbid obesity with BMI of 40.0-44.9,  adult    Ventral hernia without obstruction or gangrene    Post laminectomy syndrome    Hypertension, unspecified type    Hyperlipidemia, unspecified hyperlipidemia type    Anxiety    Depression, unspecified depression type    Other orders  -     fenofibrate (TRICOR) 145 MG tablet; Take 1 tablet (145 mg total) by mouth once. for 1 dose  Dispense: 90 tablet; Refill: 1  -     enalapriL-hydrochlorothiazide (VASERETIC) 10-25 mg per tablet; Take 1 tablet by mouth every morning.  Dispense: 90 tablet; Refill: 1  -     atorvastatin (LIPITOR) 20 MG tablet; Take 1 tablet (20 mg total) by mouth once daily.  Dispense: 90 tablet; Refill: 1        Multiple medical issues  Chronic back pain--sees a pain physician--in mainly to review CT scan results-- CT scan of the lumbar spine---11/30/2020---  Fusion L3-S1---left sided ride fracture of L5-S1--solid fusion L3-S1--degenerative changes with moderate spinal canal stenosis L2-3---bilateral nonobstructing nephrolithiasis--colonic diverticulosis---hepatics steatosis  Patient told needs to see neurosurgeon as his main p[hysician for back pain --left side ilana fracture L5-S1 and moderate spinal canal stenosis L2-3--patient has seen pain physician in the past had epidural steroid injection states was unable to sit up straight for 6 weeks--worried if has another epidural steroid injection may be paralyzed.  Discuss TENS Unit--lidoderm patch --but mainly needs to see neurosurgeon  Morbid obesity--lost 10 lb--wonders if best for patient to lose weight prior to having back surgery--but appears unable to or unwilling to get repeat epidural steroid injections if no relief with 10 Nisha it would require surgery  Abdominal discomfort right costal angle describing possible hernia does have a ventral hernia and Hx BRB per rectum--colonoscopy scheduled in January--needs to be on diverticulosis diet---continue to decrease weight--ventral hernia does not appear to need to be surgically repaired CT scan  does not list a significant finding as far as the abdominal wall but CT scan was mainly for lumbar spine  Multiple other medical issues  Depression--needs to see a psychiatrist--multiple issues that need to be addressed should be on medication for depression  Hyperlipidemia patient on Lipitor tri cor  Decreased hearing left ear--cerumen impaction--unable to visualize ear canal  Hypertension/hyperlipidemia Vasotec and vasoretic  for blood pressure on  Lipitor for cholesterol--needs to exercise as tolerated try to get to ideal body weight--Needs arm blood pressure cuff check blood pressure daily Needs blood pressure be 140/90 or less and greater than 90/60  History hypogonadism has been on testosterone now with folliculitis so the lesions on the anterior chest Multiple medical issues glaucoma/constipation/obesity/anxiety/depression  Lab --needs CBCs CMP lipid q.6 months--needs testosterone level yearly--needs lab now   Needs to see neurosurgeon to evaluate lower back  Health maintenance hepatitis C HIV pneumococcal vaccine shingles colorectal screen flu

## 2020-12-10 ENCOUNTER — HOSPITAL ENCOUNTER (OUTPATIENT)
Dept: RADIOLOGY | Facility: HOSPITAL | Age: 55
Discharge: HOME | End: 2020-12-10
Attending: PHYSICIAN ASSISTANT
Payer: MEDICARE

## 2020-12-10 ENCOUNTER — OFFICE VISIT (OUTPATIENT)
Dept: NEUROSURGERY | Facility: CLINIC | Age: 55
End: 2020-12-10
Payer: MEDICARE

## 2020-12-10 VITALS — WEIGHT: 269.38 LBS | BODY MASS INDEX: 40.83 KG/M2 | HEIGHT: 68 IN

## 2020-12-10 DIAGNOSIS — M54.50 CHRONIC BILATERAL LOW BACK PAIN WITHOUT SCIATICA: Primary | ICD-10-CM

## 2020-12-10 DIAGNOSIS — M54.50 LOW BACK PAIN, UNSPECIFIED BACK PAIN LATERALITY, UNSPECIFIED CHRONICITY, UNSPECIFIED WHETHER SCIATICA PRESENT: ICD-10-CM

## 2020-12-10 DIAGNOSIS — M51.36 DDD (DEGENERATIVE DISC DISEASE), LUMBAR: ICD-10-CM

## 2020-12-10 DIAGNOSIS — G89.29 CHRONIC BILATERAL LOW BACK PAIN WITHOUT SCIATICA: Primary | ICD-10-CM

## 2020-12-10 DIAGNOSIS — Z98.1 HISTORY OF LUMBAR FUSION: ICD-10-CM

## 2020-12-10 DIAGNOSIS — M48.061 SPINAL STENOSIS, LUMBAR REGION WITHOUT NEUROGENIC CLAUDICATION: ICD-10-CM

## 2020-12-10 PROCEDURE — 1125F AMNT PAIN NOTED PAIN PRSNT: CPT | Mod: S$GLB,,, | Performed by: PHYSICIAN ASSISTANT

## 2020-12-10 PROCEDURE — 99214 PR OFFICE/OUTPT VISIT, EST, LEVL IV, 30-39 MIN: ICD-10-PCS | Mod: S$GLB,,, | Performed by: PHYSICIAN ASSISTANT

## 2020-12-10 PROCEDURE — 99999 PR PBB SHADOW E&M-EST. PATIENT-LVL IV: CPT | Mod: PBBFAC,,, | Performed by: PHYSICIAN ASSISTANT

## 2020-12-10 PROCEDURE — 72120 X-RAY BEND ONLY L-S SPINE: CPT | Mod: TC,PN

## 2020-12-10 PROCEDURE — 3008F PR BODY MASS INDEX (BMI) DOCUMENTED: ICD-10-PCS | Mod: CPTII,S$GLB,, | Performed by: PHYSICIAN ASSISTANT

## 2020-12-10 PROCEDURE — 3008F BODY MASS INDEX DOCD: CPT | Mod: CPTII,S$GLB,, | Performed by: PHYSICIAN ASSISTANT

## 2020-12-10 PROCEDURE — 1125F PR PAIN SEVERITY QUANTIFIED, PAIN PRESENT: ICD-10-PCS | Mod: S$GLB,,, | Performed by: PHYSICIAN ASSISTANT

## 2020-12-10 PROCEDURE — 72120 X-RAY BEND ONLY L-S SPINE: CPT | Mod: 26,,, | Performed by: RADIOLOGY

## 2020-12-10 PROCEDURE — 72120 XR LUMBAR SPINE AP AND LAT WITH FLEX/EXT: ICD-10-PCS | Mod: 26,,, | Performed by: RADIOLOGY

## 2020-12-10 PROCEDURE — 72100 XR LUMBAR SPINE AP AND LAT WITH FLEX/EXT: ICD-10-PCS | Mod: 26,,, | Performed by: RADIOLOGY

## 2020-12-10 PROCEDURE — 72100 X-RAY EXAM L-S SPINE 2/3 VWS: CPT | Mod: 26,,, | Performed by: RADIOLOGY

## 2020-12-10 PROCEDURE — 99214 OFFICE O/P EST MOD 30 MIN: CPT | Mod: S$GLB,,, | Performed by: PHYSICIAN ASSISTANT

## 2020-12-10 PROCEDURE — 99999 PR PBB SHADOW E&M-EST. PATIENT-LVL IV: ICD-10-PCS | Mod: PBBFAC,,, | Performed by: PHYSICIAN ASSISTANT

## 2020-12-10 NOTE — PROGRESS NOTES
Subjective:     Patient ID:  Collin Almaguer Sr. is a 55 y.o. male.    Brett    Chief Complaint:  Low back pain and left leg pain and paresthesias    HPI    Collin Almaguer Sr. is a 55 y.o. male who presents with the above CC.  Patient states that he had a spine surgery about 10 years ago with Dr. Salomón Wang in [place] at [facility].  Patient states that he had a benign tumor removed and a lumbar fusion surgery.  Before that surgery had some back pain and left leg numbness and paresthesias that got somewhat better but then has been getting worse in the past 2 years.  He has pain across the low back that is constant and any position makes it worse and it is better with lying down.  He has constant pins and needle sensation in the left lateral calf.  He gets spasms in the posterior calves.  Occasionally he does have some bilateral anterior thigh pain mainly when going from a sitting to standing position.    The back pain bothers him more than the left leg pain    He has been disabled since his surgery.  He has been following with Dr. Tunde Kim for pain management where he has been getting medications.  He tried 1 epidural steroid injection without relief and had an adverse reaction to it.  He has not done a formal physical therapy program.    Patient denies any recent accidents or trauma, no saddle anesthesias, and no bowel or bladder incontinence.      Review of Systems:    Review of Systems   Constitutional: Negative for chills, diaphoresis, fever, malaise/fatigue and weight loss.   HENT: Negative for congestion, ear discharge, ear pain, hearing loss, nosebleeds, sinus pain, sore throat and tinnitus.    Eyes: Negative for blurred vision, double vision, photophobia, pain, discharge and redness.   Respiratory: Positive for cough and shortness of breath. Negative for hemoptysis, sputum production, wheezing and stridor.    Cardiovascular: Positive for PND. Negative for chest pain, palpitations, orthopnea and leg  swelling.   Gastrointestinal: Positive for abdominal pain. Negative for blood in stool, constipation, diarrhea, heartburn, melena, nausea and vomiting.   Genitourinary: Positive for urgency. Negative for dysuria, flank pain, frequency and hematuria.   Musculoskeletal: Positive for back pain, joint pain and neck pain. Negative for falls and myalgias.   Skin: Negative for itching and rash.   Neurological: Positive for weakness and headaches. Negative for dizziness, tingling, tremors, sensory change, speech change, seizures and loss of consciousness.   Endo/Heme/Allergies: Negative for environmental allergies and polydipsia. Does not bruise/bleed easily.   Psychiatric/Behavioral: Positive for depression. Negative for hallucinations, memory loss and substance abuse. The patient is nervous/anxious. The patient does not have insomnia.          Past Medical History:   Diagnosis Date    Depression     Hyperlipemia     Hypertension      Past Surgical History:   Procedure Laterality Date    LAMINECTOMY       Current Outpatient Medications on File Prior to Visit   Medication Sig Dispense Refill    atorvastatin (LIPITOR) 20 MG tablet Take 1 tablet (20 mg total) by mouth once daily. 90 tablet 1    betamethasone valerate 0.1% (VALISONE) 0.1 % Crea Apply topically 2 (two) times daily. 60 g 2    cyclobenzaprine (FLEXERIL) 10 MG tablet Take 1 tablet by mouth nightly as needed.      diazePAM (VALIUM) 10 MG Tab       enalapriL-hydrochlorothiazide (VASERETIC) 10-25 mg per tablet Take 1 tablet by mouth every morning. 90 tablet 1    gabapentin (NEURONTIN) 800 MG tablet Take 1 tablet by mouth 2 (two) times daily.      HYDROcodone-acetaminophen (NORCO)  mg per tablet Take 1 tablet by mouth every 8 (eight) hours as needed.      ibuprofen (ADVIL,MOTRIN) 800 MG tablet Take 1 tablet by mouth every 6 (six) hours as needed.      latanoprost 0.005 % ophthalmic solution Place 1 drop into both eyes every evening.      oxybutynin  "(DITROPAN-XL) 10 MG 24 hr tablet       sertraline (ZOLOFT) 100 MG tablet Take 1 tablet by mouth once daily.      testosterone cypionate (DEPOTESTOTERONE CYPIONATE) 200 mg/mL injection       fenofibrate (TRICOR) 145 MG tablet Take 1 tablet (145 mg total) by mouth once. for 1 dose 90 tablet 1     No current facility-administered medications on file prior to visit.      Review of patient's allergies indicates:   Allergen Reactions    Fish containing products      TUNA FISH    Pcn [penicillins]      Social History     Socioeconomic History    Marital status: Legally      Spouse name: Not on file    Number of children: Not on file    Years of education: Not on file    Highest education level: Not on file   Occupational History    Not on file   Social Needs    Financial resource strain: Not on file    Food insecurity     Worry: Not on file     Inability: Not on file    Transportation needs     Medical: Not on file     Non-medical: Not on file   Tobacco Use    Smoking status: Never Smoker    Smokeless tobacco: Never Used   Substance and Sexual Activity    Alcohol use: No    Drug use: No    Sexual activity: Not on file   Lifestyle    Physical activity     Days per week: Not on file     Minutes per session: Not on file    Stress: Not on file   Relationships    Social connections     Talks on phone: Not on file     Gets together: Not on file     Attends [employer] service: Not on file     Active member of club or organization: Not on file     Attends meetings of clubs or organizations: Not on file     Relationship status: Not on file   Other Topics Concern    Not on file   Social History Narrative    Not on file     History reviewed. No pertinent family history.    Objective:      Vitals:    12/10/20 1520   Weight: 122.2 kg (269 lb 6.4 oz)   Height: 5' 8" (1.727 m)   PainSc:   4         Physical Exam:      General:  Collin Almaguer Sr. is well-developed, well-nourished, appears stated age, " in no acute distress, alert and oriented to person, place, and time.    Pulmonary/Chest:  Respiratory effort normal  Abdominal: Exhibits no distension  Psychiatric:  Normal mood and affect.  Behavior is normal.  Judgement and thought content normal      Musculoskeletal:    Patient is able to walk on heels and toes without difficulty.    Lumbar ROM:   Pain in lumbar flexion, extension, left lateral bending, and right lateral bending.    Lumbar Spine Inspection:  Healed surgical scars and no visible rashes.    Lumbar Spine Palpation:  No tenderness to low back palpation.    SI Joint Palpation:  No tenderness to SI Joint palpation.    Straight Leg Raise:  Negative right and left SLR.      Neurological:     Muscle strength against resistance:     Right Left   Hip flexion  5 / 5 5 / 5   Hip extension 5 / 5 5 / 5   Hip abduction 5 / 5 5 / 5   Hip adduction  5 / 5 5 / 5   Knee extension  5 / 5 5 / 5   Knee flexion 5 / 5 5 / 5   Dorsiflexion  5 / 5 5 / 5   EHL  5 / 5 5 / 5   Plantar flexion  5 / 5 5 / 5   Inversion of the feet 5 / 5 5 / 5   Eversion of the feet  5 / 5 5 / 5       Reflexes:     Right Left   Patellar 2+ 2+   Achilles 2+ 2+     Clonus:  Negative bilaterally  Sensation decreased to light touch in the left L4-L5 and S1 distribution compared to the right leg.    On gross examination of the bilateral upper extremities, patient has full painfree ROM with no signs of clubbing, cyanosis, edema, or weakness.       XRAY/CT Interpretation:     Lumbar spine xrays were personally reviewed today.  No bone fractures.  No movement on flexion and extension.  There is a fracture of the left ilana.    CT lumbar spine personally reviewed.  There is central spinal stenosis at L2-3.  There is a fracture of the left ilana.  Solid bony fusion noted.      Assessment:          1. Chronic bilateral low back pain without sciatica    2. History of lumbar fusion    3. DDD (degenerative disc disease), lumbar    4. Spinal stenosis, lumbar  region without neurogenic claudication            Plan:          Orders Placed This Encounter    MRI Lumbar Spine W WO Contrast       Patient is status post lumbar spine surgery 10 years ago in [place] for what he says was a benign tumor as well as lumbar fusion with instrumentation.  He has a fracture of the left ilana.  He has adjacent segment disease at L2-3 with spinal stenosis.    -continue medications per his pain management physician  -patient is very concerned about the fractured left ilana.  I explained to him that his spine has fused and everything is solid.  -we will get MRI lumbar spine with and without contrast and refer to Dr. Dan for further evaluation and treatment and recommendations regarding his condition.    Follow-Up:  Follow up in about 1 month (around 1/10/2021). If there are any questions prior to this, the patient was instructed to contact the office.       Aleyda Estrada [facility], PA-C  Neurosurgery  [facility]

## 2020-12-10 NOTE — LETTER
December 11, 2020      Jaret Armendariz MD  [address] [place] [address]           [facility]  [address]  [place] [address]  Phone: [phone]          Patient: Collin Almaguer Sr.   MR Number: [id number]   YOB: 1965   Date of Visit: 12/10/2020       Dear Dr. Jaret Armendariz:    Thank you for referring Collin Almaguer to me for evaluation. Attached you will find relevant portions of my assessment and plan of care.    If you have questions, please do not hesitate to call me. I look forward to following Collin Almaguer along with you.    Sincerely,    Aleyda Estrada PA-C    Enclosure  CC:  No Recipients    If you would like to receive this communication electronically, please contact [email or web address] or [phone] to request more information on [facility] [employer] access.    For providers and/or their staff who would like to refer a patient to [facility], please contact us through our one-stop-shop provider referral line, [facility] , at [phone].    If you feel you have received this communication in error or would no longer like to receive these types of communications, please e-mail [email or web address]

## 2020-12-11 ENCOUNTER — TELEPHONE (OUTPATIENT)
Dept: NEUROSURGERY | Facility: CLINIC | Age: 55
End: 2020-12-11

## 2020-12-11 RX ORDER — DIAZEPAM 5 MG/1
TABLET ORAL
Qty: 5 TABLET | Refills: 0 | Status: SHIPPED | OUTPATIENT
Start: 2020-12-11 | End: 2020-12-11 | Stop reason: SDUPTHER

## 2020-12-11 RX ORDER — DIAZEPAM 5 MG/1
TABLET ORAL
Qty: 5 TABLET | Refills: 0 | Status: SHIPPED | OUTPATIENT
Start: 2020-12-11 | End: 2022-09-21 | Stop reason: CLARIF

## 2020-12-11 NOTE — TELEPHONE ENCOUNTER
Valium prescribed for MRI.    Please let patient know.    PRABHJOT Wallace, PA-C  Neurosurgery  [facility]

## 2020-12-11 NOTE — TELEPHONE ENCOUNTER
----- Message from Jil Nicolas sent at 12/11/2020 12:35 PM CST -----  Contact: [phone]/ patient  RODNEY BLUE calling requesting a Rx be called in to calm him down before his MRI  Please advise

## 2020-12-11 NOTE — TELEPHONE ENCOUNTER
----- Message from Reji Sharp sent at 12/11/2020  1:49 PM CST -----   Name of Who is Calling:     What is the request in detail:  pharmacy state will not fill medication   diazePAM (VALIUM) 5 MG tablet     / patient has to fill control substance at pharmacy he use  / [facility]    Please contact to further discuss and advise      Can the clinic reply by [facility]: no     What Number to Call Back [facility]  [facility]  /[facility] / [phone]     
Ok sent valium to [facility].    Aleyda Estrada, [facility], PA-C  Neurosurgery  [facility]    
Spoke to pharmacy and they are refusing to fill prescription needs to be sent to [facility]  
none

## 2020-12-14 ENCOUNTER — TELEPHONE (OUTPATIENT)
Dept: PRIMARY CARE CLINIC | Facility: CLINIC | Age: 55
End: 2020-12-14

## 2020-12-14 NOTE — TELEPHONE ENCOUNTER
Spoke with patient and notified of all lab test ordered patient is concerned with diabetes notified can not determine until has lab work done,paitent with multiple problems.

## 2020-12-14 NOTE — TELEPHONE ENCOUNTER
----- Message from Janeth Bergman sent at 12/14/2020  1:48 PM CST -----  Contact: self [phone]  Pt states he is falling asleep all day long. Pt is requesting to have lab orders for his A1C checked. Pt is trying to have all of his labs done on 12/15. Please call and advise.

## 2020-12-15 ENCOUNTER — PATIENT MESSAGE (OUTPATIENT)
Dept: NEUROSURGERY | Facility: CLINIC | Age: 55
End: 2020-12-15

## 2020-12-16 ENCOUNTER — TELEPHONE (OUTPATIENT)
Dept: PRIMARY CARE CLINIC | Facility: CLINIC | Age: 55
End: 2020-12-16

## 2020-12-16 RX ORDER — POLYETHYLENE GLYCOL 3350, SODIUM SULFATE ANHYDROUS, SODIUM BICARBONATE, SODIUM CHLORIDE, POTASSIUM CHLORIDE 236; 22.74; 6.74; 5.86; 2.97 G/4L; G/4L; G/4L; G/4L; G/4L
4 POWDER, FOR SOLUTION ORAL ONCE
Qty: 4000 ML | Refills: 0 | Status: SHIPPED | OUTPATIENT
Start: 2020-12-16 | End: 2020-12-16

## 2020-12-16 NOTE — TELEPHONE ENCOUNTER
----- Message from Nuzhat Lane sent at 12/16/2020 11:24 AM CST -----  Contact: [phone]  Patient is at the lab, getting some labs done, to get ready for his MRI, he states on his last visit dr Armendariz was going to order labs to check his Kidney, the lab personnel at [facility] is telling him that they don't see anything on the orders to check his Kidney, he's asking for a call back asap    thanks

## 2020-12-16 NOTE — TELEPHONE ENCOUNTER
Called patient and advised the Colonoscopy Prep has been ordered from the patient identified preferred pharmacy on file. Patient responded thanks a lot.

## 2020-12-16 NOTE — TELEPHONE ENCOUNTER
Spoke with patient, verbalized that one of the blood tests he had drawn is a CMP and it does check kidney function. Patient verbalized understanding.

## 2020-12-17 ENCOUNTER — PATIENT MESSAGE (OUTPATIENT)
Dept: PRIMARY CARE CLINIC | Facility: CLINIC | Age: 55
End: 2020-12-17

## 2020-12-17 RX ORDER — ERGOCALCIFEROL 1.25 MG/1
50000 CAPSULE ORAL
Qty: 12 CAPSULE | Refills: 3 | Status: SHIPPED | OUTPATIENT
Start: 2020-12-17 | End: 2021-12-07 | Stop reason: SDUPTHER

## 2020-12-22 ENCOUNTER — PATIENT MESSAGE (OUTPATIENT)
Dept: PRIMARY CARE CLINIC | Facility: CLINIC | Age: 55
End: 2020-12-22

## 2020-12-24 ENCOUNTER — PATIENT MESSAGE (OUTPATIENT)
Dept: PRIMARY CARE CLINIC | Facility: CLINIC | Age: 55
End: 2020-12-24

## 2020-12-28 ENCOUNTER — PATIENT MESSAGE (OUTPATIENT)
Dept: PRIMARY CARE CLINIC | Facility: CLINIC | Age: 55
End: 2020-12-28

## 2020-12-29 ENCOUNTER — TELEPHONE (OUTPATIENT)
Dept: SURGERY | Facility: CLINIC | Age: 55
End: 2020-12-29

## 2020-12-29 ENCOUNTER — PATIENT OUTREACH (OUTPATIENT)
Dept: ADMINISTRATIVE | Facility: OTHER | Age: 55
End: 2020-12-29

## 2020-12-29 NOTE — PROGRESS NOTES
LINKS immunization registry not responding  Care Everywhere updated  Health Maintenance updated  Chart reviewed for overdue Proactive [facility] Encounters (GORD[facility]) health maintenance testing (CRS, Breast Ca, Diabetic Eye Exam)   Orders entered:N/A

## 2020-12-30 ENCOUNTER — HOSPITAL ENCOUNTER (OUTPATIENT)
Dept: RADIOLOGY | Facility: HOSPITAL | Age: 55
Discharge: HOME | End: 2020-12-30
Attending: NEUROLOGICAL SURGERY
Payer: MEDICARE

## 2020-12-30 ENCOUNTER — OFFICE VISIT (OUTPATIENT)
Dept: NEUROSURGERY | Facility: CLINIC | Age: 55
End: 2020-12-30
Payer: MEDICARE

## 2020-12-30 VITALS
SYSTOLIC BLOOD PRESSURE: 146 MMHG | WEIGHT: 260 LBS | DIASTOLIC BLOOD PRESSURE: 95 MMHG | HEART RATE: 101 BPM | HEIGHT: 68 IN | BODY MASS INDEX: 39.4 KG/M2

## 2020-12-30 DIAGNOSIS — G89.29 CHRONIC BILATERAL LOW BACK PAIN WITHOUT SCIATICA: ICD-10-CM

## 2020-12-30 DIAGNOSIS — M54.50 CHRONIC BILATERAL LOW BACK PAIN WITHOUT SCIATICA: ICD-10-CM

## 2020-12-30 DIAGNOSIS — Z98.1 HISTORY OF LUMBAR FUSION: ICD-10-CM

## 2020-12-30 DIAGNOSIS — M48.061 SPINAL STENOSIS, LUMBAR REGION WITHOUT NEUROGENIC CLAUDICATION: ICD-10-CM

## 2020-12-30 DIAGNOSIS — Z98.1 HISTORY OF LUMBAR FUSION: Primary | ICD-10-CM

## 2020-12-30 DIAGNOSIS — M54.50 LOW BACK PAIN, UNSPECIFIED BACK PAIN LATERALITY, UNSPECIFIED CHRONICITY, UNSPECIFIED WHETHER SCIATICA PRESENT: ICD-10-CM

## 2020-12-30 PROCEDURE — 3077F PR MOST RECENT SYSTOLIC BLOOD PRESSURE >= 140 MM HG: ICD-10-PCS | Mod: CPTII,S$GLB,, | Performed by: NEUROLOGICAL SURGERY

## 2020-12-30 PROCEDURE — 72082 X-RAY EXAM ENTIRE SPI 2/3 VW: CPT | Mod: TC,FY

## 2020-12-30 PROCEDURE — 1125F PR PAIN SEVERITY QUANTIFIED, PAIN PRESENT: ICD-10-PCS | Mod: S$GLB,,, | Performed by: NEUROLOGICAL SURGERY

## 2020-12-30 PROCEDURE — 1125F AMNT PAIN NOTED PAIN PRSNT: CPT | Mod: S$GLB,,, | Performed by: NEUROLOGICAL SURGERY

## 2020-12-30 PROCEDURE — 99214 PR OFFICE/OUTPT VISIT, EST, LEVL IV, 30-39 MIN: ICD-10-PCS | Mod: S$GLB,,, | Performed by: NEUROLOGICAL SURGERY

## 2020-12-30 PROCEDURE — 3008F PR BODY MASS INDEX (BMI) DOCUMENTED: ICD-10-PCS | Mod: CPTII,S$GLB,, | Performed by: NEUROLOGICAL SURGERY

## 2020-12-30 PROCEDURE — 3077F SYST BP >= 140 MM HG: CPT | Mod: CPTII,S$GLB,, | Performed by: NEUROLOGICAL SURGERY

## 2020-12-30 PROCEDURE — 72190 XR PELVIS COMPLETE MIN 3 VIEWS: ICD-10-PCS | Mod: 26,,, | Performed by: RADIOLOGY

## 2020-12-30 PROCEDURE — 3008F BODY MASS INDEX DOCD: CPT | Mod: CPTII,S$GLB,, | Performed by: NEUROLOGICAL SURGERY

## 2020-12-30 PROCEDURE — 3080F PR MOST RECENT DIASTOLIC BLOOD PRESSURE >= 90 MM HG: ICD-10-PCS | Mod: CPTII,S$GLB,, | Performed by: NEUROLOGICAL SURGERY

## 2020-12-30 PROCEDURE — 99999 PR PBB SHADOW E&M-EST. PATIENT-LVL IV: CPT | Mod: PBBFAC,,, | Performed by: NEUROLOGICAL SURGERY

## 2020-12-30 PROCEDURE — 72190 X-RAY EXAM OF PELVIS: CPT | Mod: TC,FY

## 2020-12-30 PROCEDURE — 99214 OFFICE O/P EST MOD 30 MIN: CPT | Mod: S$GLB,,, | Performed by: NEUROLOGICAL SURGERY

## 2020-12-30 PROCEDURE — 72082 X-RAY EXAM ENTIRE SPI 2/3 VW: CPT | Mod: 26,,, | Performed by: RADIOLOGY

## 2020-12-30 PROCEDURE — 3080F DIAST BP >= 90 MM HG: CPT | Mod: CPTII,S$GLB,, | Performed by: NEUROLOGICAL SURGERY

## 2020-12-30 PROCEDURE — 72190 X-RAY EXAM OF PELVIS: CPT | Mod: 26,,, | Performed by: RADIOLOGY

## 2020-12-30 PROCEDURE — 72082 XR SPINE SURVEY AP AND LATERAL: ICD-10-PCS | Mod: 26,,, | Performed by: RADIOLOGY

## 2020-12-30 PROCEDURE — 99999 PR PBB SHADOW E&M-EST. PATIENT-LVL IV: ICD-10-PCS | Mod: PBBFAC,,, | Performed by: NEUROLOGICAL SURGERY

## 2020-12-30 RX ORDER — VALACYCLOVIR HYDROCHLORIDE 500 MG/1
TABLET, FILM COATED ORAL
COMMUNITY
Start: 2020-12-14 | End: 2020-12-30

## 2021-01-04 PROBLEM — K57.90 DIVERTICULOSIS: Status: ACTIVE | Noted: 2021-01-04

## 2021-01-04 PROBLEM — K63.5 COLONIC POLYP: Status: ACTIVE | Noted: 2021-01-04

## 2021-01-07 ENCOUNTER — TELEPHONE (OUTPATIENT)
Dept: GASTROENTEROLOGY | Facility: CLINIC | Age: 56
End: 2021-01-07

## 2021-01-08 ENCOUNTER — PATIENT MESSAGE (OUTPATIENT)
Dept: PRIMARY CARE CLINIC | Facility: CLINIC | Age: 56
End: 2021-01-08

## 2021-01-08 ENCOUNTER — PATIENT MESSAGE (OUTPATIENT)
Dept: NEUROSURGERY | Facility: CLINIC | Age: 56
End: 2021-01-08

## 2021-01-08 ENCOUNTER — TELEPHONE (OUTPATIENT)
Dept: PRIMARY CARE CLINIC | Facility: CLINIC | Age: 56
End: 2021-01-08

## 2021-01-08 NOTE — PATIENT INSTRUCTIONS
High-Fiber Diet  Fiber is in fruits, vegetables, cereals, and grains. Fiber passes through your body undigested. A high-fiber diet helps food move through your intestinal tract. The added bulk is helpful in preventing constipation. In people with diverticulosis, fiber helps clean out the pouches along the colon wall. It also prevents new pouches from forming. A high-fiber diet reduces the risk of colon cancer. It also lowers blood cholesterol and prevents high blood sugar in people with diabetes.    The fiber-rich foods listed below should be part of your diet. If you are not used to high-fiber foods, start with 1 or 2 foods from this list. Every 3 to 4 days add a new one to your diet. Do this until you are eating 4 high-fiber foods per day. This should give you 20 to 35 grams of fiber a day. It is also important to drink a lot of water when you are on this diet. You should have 6 to 8 glasses of water a day. Water makes the fiber swell and increases the benefit.  Foods high in dietary fiber  The following foods are high in dietary fiber:  · Breads. Breads made with 100% whole-wheat flour; annie, wheat, or rye crackers; whole-grain tortillas, bran muffins.  · Cereals. Whole-grain and bran cereals with bran (shredded wheat, wheat flakes, raisin bran, corn bran); oatmeal, rolled oats, granola, and brown rice.  · Fruits. Fresh fruits and their edible skins (pears, prunes, raisins, berries, apples, and apricots); bananas, citrus fruit, mangoes, pineapple; and prune juice.  · Nuts. Any nuts and seeds.  · Vegetables. Best served raw or lightly cooked. All types, especially: green peas, celery, eggplant, potatoes, spinach, broccoli, Bronson sprouts, winter squash, carrots, cauliflower, soybeans, lentils, and fresh and dried beans of all kinds.  · Other. Popcorn, any spices.  Date Last Reviewed: 8/1/2016  © 4497-8215 [employer]. [address] [place] [address], [place] [address]. All rights reserved. This  information is not intended as a substitute for professional medical care. Always follow your healthcare professional's instructions.

## 2021-01-11 ENCOUNTER — TELEPHONE (OUTPATIENT)
Dept: PRIMARY CARE CLINIC | Facility: CLINIC | Age: 56
End: 2021-01-11

## 2021-01-14 ENCOUNTER — PATIENT MESSAGE (OUTPATIENT)
Dept: PRIMARY CARE CLINIC | Facility: CLINIC | Age: 56
End: 2021-01-14

## 2021-01-15 ENCOUNTER — PATIENT MESSAGE (OUTPATIENT)
Dept: PRIMARY CARE CLINIC | Facility: CLINIC | Age: 56
End: 2021-01-15

## 2021-01-27 ENCOUNTER — OFFICE VISIT (OUTPATIENT)
Dept: PRIMARY CARE CLINIC | Facility: CLINIC | Age: 56
End: 2021-01-27
Payer: MEDICARE

## 2021-01-27 VITALS
SYSTOLIC BLOOD PRESSURE: 126 MMHG | HEIGHT: 68 IN | RESPIRATION RATE: 17 BRPM | HEART RATE: 85 BPM | BODY MASS INDEX: 38.76 KG/M2 | DIASTOLIC BLOOD PRESSURE: 80 MMHG | WEIGHT: 255.75 LBS | OXYGEN SATURATION: 96 %

## 2021-01-27 DIAGNOSIS — M25.512 ACUTE PAIN OF LEFT SHOULDER: Primary | ICD-10-CM

## 2021-01-27 DIAGNOSIS — G89.29 CHRONIC LEFT-SIDED LOW BACK PAIN WITHOUT SCIATICA: ICD-10-CM

## 2021-01-27 DIAGNOSIS — E78.5 HYPERLIPIDEMIA, UNSPECIFIED HYPERLIPIDEMIA TYPE: ICD-10-CM

## 2021-01-27 DIAGNOSIS — E66.9 OBESITY, UNSPECIFIED CLASSIFICATION, UNSPECIFIED OBESITY TYPE, UNSPECIFIED WHETHER SERIOUS COMORBIDITY PRESENT: ICD-10-CM

## 2021-01-27 DIAGNOSIS — E29.1 HYPOGONADISM MALE: ICD-10-CM

## 2021-01-27 DIAGNOSIS — S16.1XXD STRAIN OF NECK MUSCLE, SUBSEQUENT ENCOUNTER: ICD-10-CM

## 2021-01-27 DIAGNOSIS — F32.A DEPRESSION, UNSPECIFIED DEPRESSION TYPE: ICD-10-CM

## 2021-01-27 DIAGNOSIS — I10 HYPERTENSION, UNSPECIFIED TYPE: ICD-10-CM

## 2021-01-27 DIAGNOSIS — M54.50 CHRONIC LEFT-SIDED LOW BACK PAIN WITHOUT SCIATICA: ICD-10-CM

## 2021-01-27 DIAGNOSIS — G89.4 CHRONIC PAIN SYNDROME: ICD-10-CM

## 2021-01-27 DIAGNOSIS — M96.1 POST LAMINECTOMY SYNDROME: ICD-10-CM

## 2021-01-27 DIAGNOSIS — H40.9 GLAUCOMA, UNSPECIFIED GLAUCOMA TYPE, UNSPECIFIED LATERALITY: ICD-10-CM

## 2021-01-27 DIAGNOSIS — F41.9 ANXIETY: ICD-10-CM

## 2021-01-27 PROCEDURE — 3079F PR MOST RECENT DIASTOLIC BLOOD PRESSURE 80-89 MM HG: ICD-10-PCS | Mod: CPTII,S$GLB,, | Performed by: FAMILY MEDICINE

## 2021-01-27 PROCEDURE — 3079F DIAST BP 80-89 MM HG: CPT | Mod: CPTII,S$GLB,, | Performed by: FAMILY MEDICINE

## 2021-01-27 PROCEDURE — 3008F BODY MASS INDEX DOCD: CPT | Mod: CPTII,S$GLB,, | Performed by: FAMILY MEDICINE

## 2021-01-27 PROCEDURE — 99999 PR PBB SHADOW E&M-EST. PATIENT-LVL V: CPT | Mod: PBBFAC,,, | Performed by: FAMILY MEDICINE

## 2021-01-27 PROCEDURE — 99214 OFFICE O/P EST MOD 30 MIN: CPT | Mod: S$GLB,,, | Performed by: FAMILY MEDICINE

## 2021-01-27 PROCEDURE — 99214 PR OFFICE/OUTPT VISIT, EST, LEVL IV, 30-39 MIN: ICD-10-PCS | Mod: S$GLB,,, | Performed by: FAMILY MEDICINE

## 2021-01-27 PROCEDURE — 3008F PR BODY MASS INDEX (BMI) DOCUMENTED: ICD-10-PCS | Mod: CPTII,S$GLB,, | Performed by: FAMILY MEDICINE

## 2021-01-27 PROCEDURE — 3074F PR MOST RECENT SYSTOLIC BLOOD PRESSURE < 130 MM HG: ICD-10-PCS | Mod: CPTII,S$GLB,, | Performed by: FAMILY MEDICINE

## 2021-01-27 PROCEDURE — 99999 PR PBB SHADOW E&M-EST. PATIENT-LVL V: ICD-10-PCS | Mod: PBBFAC,,, | Performed by: FAMILY MEDICINE

## 2021-01-27 PROCEDURE — 3074F SYST BP LT 130 MM HG: CPT | Mod: CPTII,S$GLB,, | Performed by: FAMILY MEDICINE

## 2021-02-03 ENCOUNTER — PATIENT MESSAGE (OUTPATIENT)
Dept: NEUROSURGERY | Facility: CLINIC | Age: 56
End: 2021-02-03

## 2021-02-08 ENCOUNTER — PATIENT MESSAGE (OUTPATIENT)
Dept: PRIMARY CARE CLINIC | Facility: CLINIC | Age: 56
End: 2021-02-08

## 2021-02-10 ENCOUNTER — PATIENT MESSAGE (OUTPATIENT)
Dept: PRIMARY CARE CLINIC | Facility: CLINIC | Age: 56
End: 2021-02-10

## 2021-02-12 ENCOUNTER — PATIENT MESSAGE (OUTPATIENT)
Dept: PRIMARY CARE CLINIC | Facility: CLINIC | Age: 56
End: 2021-02-12

## 2021-02-18 ENCOUNTER — PATIENT OUTREACH (OUTPATIENT)
Dept: ADMINISTRATIVE | Facility: OTHER | Age: 56
End: 2021-02-18

## 2021-02-23 ENCOUNTER — PATIENT MESSAGE (OUTPATIENT)
Dept: NEUROSURGERY | Facility: CLINIC | Age: 56
End: 2021-02-23

## 2021-02-24 ENCOUNTER — PATIENT MESSAGE (OUTPATIENT)
Dept: NEUROSURGERY | Facility: CLINIC | Age: 56
End: 2021-02-24

## 2021-03-02 ENCOUNTER — HOSPITAL ENCOUNTER (OUTPATIENT)
Dept: RADIOLOGY | Facility: HOSPITAL | Age: 56
Discharge: HOME | End: 2021-03-02
Attending: FAMILY MEDICINE
Payer: MEDICARE

## 2021-03-02 ENCOUNTER — OFFICE VISIT (OUTPATIENT)
Dept: NEUROSURGERY | Facility: CLINIC | Age: 56
End: 2021-03-02
Payer: MEDICARE

## 2021-03-02 DIAGNOSIS — M43.8X9 SAGITTAL PLANE IMBALANCE: ICD-10-CM

## 2021-03-02 DIAGNOSIS — Z98.1 HISTORY OF LUMBAR FUSION: Primary | ICD-10-CM

## 2021-03-02 DIAGNOSIS — M54.50 CHRONIC LEFT-SIDED LOW BACK PAIN WITHOUT SCIATICA: ICD-10-CM

## 2021-03-02 DIAGNOSIS — G89.29 CHRONIC LEFT-SIDED LOW BACK PAIN WITHOUT SCIATICA: ICD-10-CM

## 2021-03-02 DIAGNOSIS — M25.512 ACUTE PAIN OF LEFT SHOULDER: ICD-10-CM

## 2021-03-02 DIAGNOSIS — M54.50 CHRONIC BILATERAL LOW BACK PAIN WITHOUT SCIATICA: ICD-10-CM

## 2021-03-02 DIAGNOSIS — G89.29 CHRONIC BILATERAL LOW BACK PAIN WITHOUT SCIATICA: ICD-10-CM

## 2021-03-02 PROCEDURE — 72110 X-RAY EXAM L-2 SPINE 4/>VWS: CPT | Mod: 26,,, | Performed by: RADIOLOGY

## 2021-03-02 PROCEDURE — 99999 PR PBB SHADOW E&M-EST. PATIENT-LVL III: CPT | Mod: PBBFAC,,, | Performed by: NEUROLOGICAL SURGERY

## 2021-03-02 PROCEDURE — 72110 X-RAY EXAM L-2 SPINE 4/>VWS: CPT | Mod: TC,PN

## 2021-03-02 PROCEDURE — 99214 PR OFFICE/OUTPT VISIT, EST, LEVL IV, 30-39 MIN: ICD-10-PCS | Mod: S$GLB,,, | Performed by: NEUROLOGICAL SURGERY

## 2021-03-02 PROCEDURE — 73030 X-RAY EXAM OF SHOULDER: CPT | Mod: TC,PN,LT

## 2021-03-02 PROCEDURE — 99214 OFFICE O/P EST MOD 30 MIN: CPT | Mod: S$GLB,,, | Performed by: NEUROLOGICAL SURGERY

## 2021-03-02 PROCEDURE — 72110 XR LUMBAR SPINE COMPLETE 5 VIEW: ICD-10-PCS | Mod: 26,,, | Performed by: RADIOLOGY

## 2021-03-02 PROCEDURE — 73030 X-RAY EXAM OF SHOULDER: CPT | Mod: 26,LT,, | Performed by: RADIOLOGY

## 2021-03-02 PROCEDURE — 99999 PR PBB SHADOW E&M-EST. PATIENT-LVL III: ICD-10-PCS | Mod: PBBFAC,,, | Performed by: NEUROLOGICAL SURGERY

## 2021-03-02 PROCEDURE — 73030 XR SHOULDER TRAUMA 3 VIEW LEFT: ICD-10-PCS | Mod: 26,LT,, | Performed by: RADIOLOGY

## 2021-03-02 RX ORDER — VALACYCLOVIR HYDROCHLORIDE 500 MG/1
TABLET, FILM COATED ORAL
COMMUNITY
Start: 2020-12-14 | End: 2022-08-16

## 2021-03-02 RX ORDER — ALFUZOSIN HYDROCHLORIDE 10 MG/1
10 TABLET, EXTENDED RELEASE ORAL DAILY
COMMUNITY
Start: 2021-02-08 | End: 2021-12-07 | Stop reason: SDUPTHER

## 2021-03-02 RX ORDER — ALFUZOSIN HYDROCHLORIDE 10 MG/1
10 TABLET, EXTENDED RELEASE ORAL
COMMUNITY
Start: 2021-02-08 | End: 2021-03-02

## 2021-03-07 DIAGNOSIS — Z98.1 HISTORY OF LUMBAR FUSION: Primary | ICD-10-CM

## 2021-03-09 ENCOUNTER — PATIENT MESSAGE (OUTPATIENT)
Dept: NEUROSURGERY | Facility: CLINIC | Age: 56
End: 2021-03-09

## 2021-03-09 ENCOUNTER — PATIENT MESSAGE (OUTPATIENT)
Dept: PRIMARY CARE CLINIC | Facility: CLINIC | Age: 56
End: 2021-03-09

## 2021-03-10 ENCOUNTER — PATIENT MESSAGE (OUTPATIENT)
Dept: NEUROSURGERY | Facility: CLINIC | Age: 56
End: 2021-03-10

## 2021-03-11 ENCOUNTER — PATIENT MESSAGE (OUTPATIENT)
Dept: NEUROSURGERY | Facility: CLINIC | Age: 56
End: 2021-03-11

## 2021-03-18 ENCOUNTER — PATIENT MESSAGE (OUTPATIENT)
Dept: NEUROSURGERY | Facility: CLINIC | Age: 56
End: 2021-03-18

## 2021-03-18 DIAGNOSIS — M43.27 FUSION OF SPINE, LUMBOSACRAL REGION: ICD-10-CM

## 2021-03-18 DIAGNOSIS — Z98.1 HISTORY OF LUMBAR FUSION: Primary | ICD-10-CM

## 2021-03-19 ENCOUNTER — PATIENT MESSAGE (OUTPATIENT)
Dept: NEUROSURGERY | Facility: CLINIC | Age: 56
End: 2021-03-19

## 2021-03-24 ENCOUNTER — PATIENT MESSAGE (OUTPATIENT)
Dept: NEUROSURGERY | Facility: CLINIC | Age: 56
End: 2021-03-24

## 2021-04-01 ENCOUNTER — PATIENT MESSAGE (OUTPATIENT)
Dept: NEUROSURGERY | Facility: CLINIC | Age: 56
End: 2021-04-01

## 2021-04-06 ENCOUNTER — HOSPITAL ENCOUNTER (OUTPATIENT)
Dept: RADIOLOGY | Facility: HOSPITAL | Age: 56
Discharge: HOME | End: 2021-04-06
Attending: NEUROLOGICAL SURGERY
Payer: MEDICARE

## 2021-04-06 DIAGNOSIS — M43.27 FUSION OF SPINE, LUMBOSACRAL REGION: ICD-10-CM

## 2021-04-06 DIAGNOSIS — Z98.1 HISTORY OF LUMBAR FUSION: ICD-10-CM

## 2021-04-06 PROCEDURE — 72131 CT LUMBAR SPINE WITHOUT CONTRAST: ICD-10-PCS | Mod: 26,,, | Performed by: RADIOLOGY

## 2021-04-06 PROCEDURE — 72131 CT LUMBAR SPINE W/O DYE: CPT | Mod: TC

## 2021-04-06 PROCEDURE — 72131 CT LUMBAR SPINE W/O DYE: CPT | Mod: 26,,, | Performed by: RADIOLOGY

## 2021-04-07 ENCOUNTER — PATIENT MESSAGE (OUTPATIENT)
Dept: NEUROSURGERY | Facility: CLINIC | Age: 56
End: 2021-04-07

## 2021-04-12 ENCOUNTER — PATIENT OUTREACH (OUTPATIENT)
Dept: ADMINISTRATIVE | Facility: OTHER | Age: 56
End: 2021-04-12

## 2021-04-13 ENCOUNTER — PATIENT MESSAGE (OUTPATIENT)
Dept: NEUROSURGERY | Facility: CLINIC | Age: 56
End: 2021-04-13

## 2021-04-20 ENCOUNTER — OFFICE VISIT (OUTPATIENT)
Dept: NEUROSURGERY | Facility: CLINIC | Age: 56
End: 2021-04-20
Payer: MEDICARE

## 2021-04-20 VITALS
DIASTOLIC BLOOD PRESSURE: 81 MMHG | HEART RATE: 118 BPM | HEIGHT: 68 IN | WEIGHT: 255 LBS | SYSTOLIC BLOOD PRESSURE: 119 MMHG | BODY MASS INDEX: 38.65 KG/M2 | RESPIRATION RATE: 14 BRPM

## 2021-04-20 DIAGNOSIS — Z98.1 HISTORY OF LUMBAR FUSION: Primary | ICD-10-CM

## 2021-04-20 DIAGNOSIS — M48.061 SPINAL STENOSIS, LUMBAR REGION WITHOUT NEUROGENIC CLAUDICATION: ICD-10-CM

## 2021-04-20 DIAGNOSIS — M54.50 CHRONIC BILATERAL LOW BACK PAIN WITHOUT SCIATICA: ICD-10-CM

## 2021-04-20 DIAGNOSIS — G89.29 CHRONIC BILATERAL LOW BACK PAIN WITHOUT SCIATICA: ICD-10-CM

## 2021-04-20 PROCEDURE — 99213 OFFICE O/P EST LOW 20 MIN: CPT | Mod: S$GLB,,, | Performed by: NEUROLOGICAL SURGERY

## 2021-04-20 PROCEDURE — 1125F AMNT PAIN NOTED PAIN PRSNT: CPT | Mod: S$GLB,,, | Performed by: NEUROLOGICAL SURGERY

## 2021-04-20 PROCEDURE — 99999 PR PBB SHADOW E&M-EST. PATIENT-LVL IV: CPT | Mod: PBBFAC,,, | Performed by: NEUROLOGICAL SURGERY

## 2021-04-20 PROCEDURE — 1125F PR PAIN SEVERITY QUANTIFIED, PAIN PRESENT: ICD-10-PCS | Mod: S$GLB,,, | Performed by: NEUROLOGICAL SURGERY

## 2021-04-20 PROCEDURE — 3008F BODY MASS INDEX DOCD: CPT | Mod: CPTII,S$GLB,, | Performed by: NEUROLOGICAL SURGERY

## 2021-04-20 PROCEDURE — 99999 PR PBB SHADOW E&M-EST. PATIENT-LVL IV: ICD-10-PCS | Mod: PBBFAC,,, | Performed by: NEUROLOGICAL SURGERY

## 2021-04-20 PROCEDURE — 3008F PR BODY MASS INDEX (BMI) DOCUMENTED: ICD-10-PCS | Mod: CPTII,S$GLB,, | Performed by: NEUROLOGICAL SURGERY

## 2021-04-20 PROCEDURE — 99213 PR OFFICE/OUTPT VISIT, EST, LEVL III, 20-29 MIN: ICD-10-PCS | Mod: S$GLB,,, | Performed by: NEUROLOGICAL SURGERY

## 2021-04-26 ENCOUNTER — PATIENT MESSAGE (OUTPATIENT)
Dept: RESEARCH | Facility: HOSPITAL | Age: 56
End: 2021-04-26

## 2021-12-08 ENCOUNTER — TELEPHONE (OUTPATIENT)
Dept: PRIMARY CARE CLINIC | Facility: CLINIC | Age: 56
End: 2021-12-08
Payer: MEDICARE

## 2021-12-08 RX ORDER — FENOFIBRATE 145 MG/1
145 TABLET, FILM COATED ORAL ONCE
Qty: 90 TABLET | Refills: 0 | Status: SHIPPED | OUTPATIENT
Start: 2021-12-08 | End: 2022-04-22 | Stop reason: SDUPTHER

## 2021-12-08 RX ORDER — ALFUZOSIN HYDROCHLORIDE 10 MG/1
10 TABLET, EXTENDED RELEASE ORAL DAILY
Qty: 30 TABLET | Refills: 2 | Status: SHIPPED | OUTPATIENT
Start: 2021-12-08 | End: 2022-04-22 | Stop reason: SDUPTHER

## 2021-12-08 RX ORDER — ERGOCALCIFEROL 1.25 MG/1
50000 CAPSULE ORAL
Qty: 12 CAPSULE | Refills: 0 | Status: SHIPPED | OUTPATIENT
Start: 2021-12-08 | End: 2022-04-22 | Stop reason: SDUPTHER

## 2021-12-08 RX ORDER — ENALAPRIL MALEATE AND HYDROCHLOROTHIAZIDE 10; 25 MG/1; MG/1
TABLET ORAL
Qty: 30 TABLET | Refills: 2 | Status: SHIPPED | OUTPATIENT
Start: 2021-12-08 | End: 2022-04-11

## 2021-12-10 ENCOUNTER — TELEPHONE (OUTPATIENT)
Dept: PRIMARY CARE CLINIC | Facility: CLINIC | Age: 56
End: 2021-12-10
Payer: MEDICARE

## 2021-12-15 RX ORDER — ATORVASTATIN CALCIUM 20 MG/1
20 TABLET, FILM COATED ORAL DAILY
Qty: 90 TABLET | Refills: 0 | Status: SHIPPED | OUTPATIENT
Start: 2021-12-15 | End: 2022-04-11

## 2022-02-17 ENCOUNTER — PATIENT MESSAGE (OUTPATIENT)
Dept: ADMINISTRATIVE | Facility: HOSPITAL | Age: 57
End: 2022-02-17
Payer: MEDICARE

## 2022-04-07 NOTE — TELEPHONE ENCOUNTER
Care Due:                  Date            Visit Type   Department     Provider  --------------------------------------------------------------------------------                                EP -                              PRIMARY      [facility] PRIMARY  Last Visit: 01-      CARE ([facility])   CARE           Jaret Armendariz  Next Visit: None Scheduled  None         None Found                                                            Last  Test          Frequency    Reason                     Performed    Due Date  --------------------------------------------------------------------------------    Office Visit  12 months..  alfuzosin, atorvastatin,   01- 01-                             enalapriL-hydrochlorothia                             zide.....................    CMP.........  12 months..  atorvastatin,              12- 12-                             enalapriL-hydrochlorothia                             zide.....................    Lipid Panel.  12 months..  atorvastatin.............  12- 12-    Powered by [employer]. Reference number: [id number].   4/07/2022 10:07:38 AM CDT

## 2022-04-08 NOTE — TELEPHONE ENCOUNTER
Refill Routing Note   Medication(s) are not appropriate for processing by [facility] for the following reason(s):      - Required vitals are outdated  - Required vitals are abnormal    [facility] action(s):  Defer Medication-related problems identified: Requires labs        Medication reconciliation completed: No     Appointments  past 12m or future 3m with PCP    Date Provider   Last Visit   1/27/2021 Jaret Armendariz MD   Next Visit   Visit date not found Jaret Armendariz MD   ED visits in past 90 days: 0        Note composed:11:53 AM 04/08/2022

## 2022-04-11 RX ORDER — ATORVASTATIN CALCIUM 20 MG/1
TABLET, FILM COATED ORAL
Qty: 90 TABLET | Refills: 0 | Status: SHIPPED | OUTPATIENT
Start: 2022-04-11 | End: 2022-04-22 | Stop reason: SDUPTHER

## 2022-04-11 RX ORDER — ENALAPRIL MALEATE AND HYDROCHLOROTHIAZIDE 10; 25 MG/1; MG/1
TABLET ORAL
Qty: 90 TABLET | Refills: 0 | Status: SHIPPED | OUTPATIENT
Start: 2022-04-11 | End: 2022-04-22 | Stop reason: SDUPTHER

## 2022-04-12 NOTE — TELEPHONE ENCOUNTER
Call tell pt last sen Jan 2021 Last lab Feb 20231 with Hypertension or hyperlipidemia needs be seen yearly OK 3 mo refills No more refills until seen gets lab and gets additional refills

## 2022-04-22 ENCOUNTER — OFFICE VISIT (OUTPATIENT)
Dept: PRIMARY CARE CLINIC | Facility: CLINIC | Age: 57
End: 2022-04-22
Payer: MEDICARE

## 2022-04-22 ENCOUNTER — TELEPHONE (OUTPATIENT)
Dept: PRIMARY CARE CLINIC | Facility: CLINIC | Age: 57
End: 2022-04-22
Payer: MEDICARE

## 2022-04-22 DIAGNOSIS — N40.0 BENIGN PROSTATIC HYPERPLASIA, UNSPECIFIED WHETHER LOWER URINARY TRACT SYMPTOMS PRESENT: ICD-10-CM

## 2022-04-22 DIAGNOSIS — E66.9 OBESITY, UNSPECIFIED CLASSIFICATION, UNSPECIFIED OBESITY TYPE, UNSPECIFIED WHETHER SERIOUS COMORBIDITY PRESENT: ICD-10-CM

## 2022-04-22 DIAGNOSIS — E78.5 HYPERLIPIDEMIA, UNSPECIFIED HYPERLIPIDEMIA TYPE: ICD-10-CM

## 2022-04-22 DIAGNOSIS — I10 HYPERTENSION, UNSPECIFIED TYPE: Primary | ICD-10-CM

## 2022-04-22 PROCEDURE — 4010F PR ACE/ARB THEARPY RXD/TAKEN: ICD-10-PCS | Mod: CPTII,95,, | Performed by: STUDENT IN AN ORGANIZED HEALTH CARE EDUCATION/TRAINING PROGRAM

## 2022-04-22 PROCEDURE — 99442 PR PHYSICIAN TELEPHONE EVALUATION 11-20 MIN: CPT | Mod: 95,,, | Performed by: STUDENT IN AN ORGANIZED HEALTH CARE EDUCATION/TRAINING PROGRAM

## 2022-04-22 PROCEDURE — 4010F ACE/ARB THERAPY RXD/TAKEN: CPT | Mod: CPTII,95,, | Performed by: STUDENT IN AN ORGANIZED HEALTH CARE EDUCATION/TRAINING PROGRAM

## 2022-04-22 PROCEDURE — 99442 PR PHYSICIAN TELEPHONE EVALUATION 11-20 MIN: ICD-10-PCS | Mod: 95,,, | Performed by: STUDENT IN AN ORGANIZED HEALTH CARE EDUCATION/TRAINING PROGRAM

## 2022-04-22 RX ORDER — ATORVASTATIN CALCIUM 20 MG/1
20 TABLET, FILM COATED ORAL DAILY
Qty: 90 TABLET | Refills: 3 | Status: SHIPPED | OUTPATIENT
Start: 2022-04-22 | End: 2022-06-28 | Stop reason: SDUPTHER

## 2022-04-22 RX ORDER — ENALAPRIL MALEATE AND HYDROCHLOROTHIAZIDE 10; 25 MG/1; MG/1
TABLET ORAL
Qty: 90 TABLET | Refills: 0 | Status: SHIPPED | OUTPATIENT
Start: 2022-04-22 | End: 2022-06-28 | Stop reason: SDUPTHER

## 2022-04-22 RX ORDER — ALFUZOSIN HYDROCHLORIDE 10 MG/1
10 TABLET, EXTENDED RELEASE ORAL DAILY
Qty: 90 TABLET | Refills: 0 | Status: SHIPPED | OUTPATIENT
Start: 2022-04-22 | End: 2022-06-28 | Stop reason: SDUPTHER

## 2022-04-22 RX ORDER — ERGOCALCIFEROL 1.25 MG/1
50000 CAPSULE ORAL
Qty: 12 CAPSULE | Refills: 0 | Status: SHIPPED | OUTPATIENT
Start: 2022-04-22 | End: 2022-06-28 | Stop reason: SDUPTHER

## 2022-04-22 RX ORDER — FENOFIBRATE 145 MG/1
145 TABLET, FILM COATED ORAL DAILY
Qty: 90 TABLET | Refills: 3 | Status: SHIPPED | OUTPATIENT
Start: 2022-04-22 | End: 2022-06-28 | Stop reason: SDUPTHER

## 2022-04-22 NOTE — PROGRESS NOTES
Established Patient - Audio Only Telehealth Visit     The patient location is: [place]  The chief complaint leading to consultation is: medication refill  Visit type: Virtual visit with audio only (telephone)  Total time spent with patient: 15       The reason for the audio only service rather than synchronous audio and video virtual visit was related to technical difficulties or patient preference/necessity.     Each patient to whom I provide medical services by telemedicine is:  (1) informed of the relationship between the physician and patient and the respective role of any other health care provider with respect to management of the patient; and (2) notified that they may decline to receive medical services by telemedicine and may withdraw from such care at any time. Patient verbally consented to receive this service via voice-only telephone call.       HPI:     58yo male presenting for medication refill.    Has an appt with his PCP on 4/25/2022.      States that he needs a refill on all his meds.  Atorvastatin, Fenofibrate, VitD, Alfuzosin    Using Alfuzosin for BPH.    Has been having back pain.    State was going to quick-trim and lost over 50lbs.           Assessment and plan:      HTN:   - requesting medication refills on his Enalapril-HCTZ   - has been taking meds daily.   - states that he does get some chest pains and SOB with exertions. Would advise getting patient further evaluated in person to discuss his pressure in more detail.    SOB on exertion:   - advised patient discuss in appt with provider next week where he can be examined. IS speaking in complete sentences at this audio visit.    Hyperlipidemia:   - requesting refills on his Lipitor and Fenofibrate which are     VitD def:   - taking Vit D 70031hz once weekly.     BPH:   - refilling Alfuzosin.    -     Obesity:   - patient requesting Adipex/phentermine today, was treated with this before and lost 50lb.  Advise we will not be starting a new  medication over the phone today, can discuss with his PCP at their appt next week.                         This service was not originating from a related E/M service provided within the previous 7 days nor will  to an E/M service or procedure within the next 24 hours or my soonest available appointment.  Prevailing standard of care was able to be met in this audio-only visit.

## 2022-04-22 NOTE — TELEPHONE ENCOUNTER
Returned patients call. Patient was apologizing for missing his appt. He wanted Dr. John to see him today. I let patient know Dr. John is not in on Fridays. He asked to see another provider because he is out of his BP medication. I scheduled him with Dr. Sean john. Patient stated that he also wanted to see Dr. Jaret John because he hasn't seen him in a while. I told patient that he can discuss all of his concerns in his appt with Trey John but he wanted to be scheduled with Jaret John as well.

## 2022-04-22 NOTE — TELEPHONE ENCOUNTER
----- Message from Lori Stallworth sent at 4/22/2022  7:18 AM CDT -----  Name Of Caller: RODNEY     Provider Name: Jaret Armendariz,    Does patient feel the need to be seen today? YES     Relationship to the Pt?: PT    Contact Preference?: [phone]    What is the nature of the call?:Pt called and would like to apologize for missing appt yesterday and would like to know if you can squeeze him in today because he is out of blood pressure medication and having some pain told him next opening was 4/27/22 but wants an appt today. Please call him back

## 2022-06-28 ENCOUNTER — OFFICE VISIT (OUTPATIENT)
Dept: PRIMARY CARE CLINIC | Facility: CLINIC | Age: 57
End: 2022-06-28
Payer: MEDICARE

## 2022-06-28 VITALS
RESPIRATION RATE: 18 BRPM | WEIGHT: 285.5 LBS | BODY MASS INDEX: 43.27 KG/M2 | SYSTOLIC BLOOD PRESSURE: 102 MMHG | HEART RATE: 120 BPM | TEMPERATURE: 97 F | DIASTOLIC BLOOD PRESSURE: 80 MMHG | HEIGHT: 68 IN | OXYGEN SATURATION: 96 %

## 2022-06-28 DIAGNOSIS — K57.90 DIVERTICULOSIS: ICD-10-CM

## 2022-06-28 DIAGNOSIS — I10 HYPERTENSION, UNSPECIFIED TYPE: ICD-10-CM

## 2022-06-28 DIAGNOSIS — E29.1 HYPOGONADISM IN MALE: ICD-10-CM

## 2022-06-28 DIAGNOSIS — N20.0 BILATERAL NEPHROLITHIASIS: ICD-10-CM

## 2022-06-28 DIAGNOSIS — E29.1 HYPOGONADISM MALE: ICD-10-CM

## 2022-06-28 DIAGNOSIS — Z98.1 HISTORY OF LUMBAR FUSION: ICD-10-CM

## 2022-06-28 DIAGNOSIS — M50.30 DDD (DEGENERATIVE DISC DISEASE), CERVICAL: ICD-10-CM

## 2022-06-28 DIAGNOSIS — M51.36 DDD (DEGENERATIVE DISC DISEASE), LUMBAR: ICD-10-CM

## 2022-06-28 DIAGNOSIS — Z13.220 SCREENING CHOLESTEROL LEVEL: Primary | ICD-10-CM

## 2022-06-28 DIAGNOSIS — E66.01 MORBID OBESITY WITH BMI OF 40.0-44.9, ADULT: ICD-10-CM

## 2022-06-28 DIAGNOSIS — G89.4 CHRONIC PAIN SYNDROME: ICD-10-CM

## 2022-06-28 DIAGNOSIS — F41.9 ANXIETY: ICD-10-CM

## 2022-06-28 DIAGNOSIS — E78.00 PURE HYPERCHOLESTEROLEMIA: ICD-10-CM

## 2022-06-28 PROCEDURE — 3074F SYST BP LT 130 MM HG: CPT | Mod: CPTII,S$GLB,, | Performed by: FAMILY MEDICINE

## 2022-06-28 PROCEDURE — 1159F MED LIST DOCD IN RCRD: CPT | Mod: CPTII,S$GLB,, | Performed by: FAMILY MEDICINE

## 2022-06-28 PROCEDURE — 3008F BODY MASS INDEX DOCD: CPT | Mod: CPTII,S$GLB,, | Performed by: FAMILY MEDICINE

## 2022-06-28 PROCEDURE — 1159F PR MEDICATION LIST DOCUMENTED IN MEDICAL RECORD: ICD-10-PCS | Mod: CPTII,S$GLB,, | Performed by: FAMILY MEDICINE

## 2022-06-28 PROCEDURE — 3079F DIAST BP 80-89 MM HG: CPT | Mod: CPTII,S$GLB,, | Performed by: FAMILY MEDICINE

## 2022-06-28 PROCEDURE — 99999 PR PBB SHADOW E&M-EST. PATIENT-LVL V: ICD-10-PCS | Mod: PBBFAC,,, | Performed by: FAMILY MEDICINE

## 2022-06-28 PROCEDURE — 99214 OFFICE O/P EST MOD 30 MIN: CPT | Mod: S$GLB,,, | Performed by: FAMILY MEDICINE

## 2022-06-28 PROCEDURE — 4010F ACE/ARB THERAPY RXD/TAKEN: CPT | Mod: CPTII,S$GLB,, | Performed by: FAMILY MEDICINE

## 2022-06-28 PROCEDURE — 99999 PR PBB SHADOW E&M-EST. PATIENT-LVL V: CPT | Mod: PBBFAC,,, | Performed by: FAMILY MEDICINE

## 2022-06-28 PROCEDURE — 3079F PR MOST RECENT DIASTOLIC BLOOD PRESSURE 80-89 MM HG: ICD-10-PCS | Mod: CPTII,S$GLB,, | Performed by: FAMILY MEDICINE

## 2022-06-28 PROCEDURE — 3074F PR MOST RECENT SYSTOLIC BLOOD PRESSURE < 130 MM HG: ICD-10-PCS | Mod: CPTII,S$GLB,, | Performed by: FAMILY MEDICINE

## 2022-06-28 PROCEDURE — 4010F PR ACE/ARB THEARPY RXD/TAKEN: ICD-10-PCS | Mod: CPTII,S$GLB,, | Performed by: FAMILY MEDICINE

## 2022-06-28 PROCEDURE — 99214 PR OFFICE/OUTPT VISIT, EST, LEVL IV, 30-39 MIN: ICD-10-PCS | Mod: S$GLB,,, | Performed by: FAMILY MEDICINE

## 2022-06-28 PROCEDURE — 3008F PR BODY MASS INDEX (BMI) DOCUMENTED: ICD-10-PCS | Mod: CPTII,S$GLB,, | Performed by: FAMILY MEDICINE

## 2022-06-28 RX ORDER — ESCITALOPRAM OXALATE 10 MG/1
10 TABLET ORAL DAILY
Qty: 30 TABLET | Refills: 11 | Status: SHIPPED | OUTPATIENT
Start: 2022-06-28 | End: 2022-09-21 | Stop reason: CLARIF

## 2022-06-28 NOTE — PROGRESS NOTES
Subjective:       Patient ID: Collin Almaguer Sr. is a 57 y.o. male.    Chief Complaint: Annual Exam     HPI 57 White male--in for annual visit--asking for weight loss pill Adipex--weird feeling in the right thigh.  Each time close to appt --alot coughing sneezing hand felt weak. OK now   Staying weak no recent lab  12 yrs Nov rods in back--had x-ray diagnostic imaging--hips bilaterally--have walks a long time hips get tired  Then  back  Pt goes to pain management .  Told has 4 rods--2 rods are in lumbar spine with fusion--and laminectomy--has 2 rods in hips        ROS:   Skin: no psoriasis, eczema, skin cancer--Skin Cancer left cheek--Dr Wise-seen past not seen recently   HEENT: +occs  headache, ocular pain, blurred vision, diplopia, epistaxis, hoarseness change in voice, thyroid trouble +glaucoma itis bilaterally left greater than right  Lung: No pneumonia, +asthma as child ,no  Tb, wheezing, SOB, no smoking not sure if wheezes   Heart: no chest pain,no ankle edema, palpitations, MI, tiffanie murmur,+hypertension,+ hyperlipidemia--no stent bypass arrhythmia--never seen by cardiologist   Abdomen: +nausea, no  vomiting, diarrhea,+ constipation-a lot better no  ulcers, hepatitis, gallbladder disease, melena, hematochezia, hematemesis   : no UTI, renal disease, stones prostate  MS: no fractures, O/A, lupus, rheumatoid, gout--neck and back problem--old xray  OK --laminectomy and rods hips --last fall patient gel over pile of debris in yd 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  Neuro: + Dizziness, no  LOC, seizures   No diabetes, no anemia, +anxiety, + depression on meds Disabled  --being home all the time makes patient depressed--found father is at home  2014  , 5 children, disability lives alone        Objective:   Physical Exam:  General: Well nourished, well developed, no acute distress + morbid obesity  Skin:  No lesion   HEENT: Eyes PERRLA, EOM intact, wax  blocking left ear canal nose patent, throat non-erythematous ears TMs clear  NECK: Supple, no bruits, No JVD, no nodes  Breast bilateral breast nodules   Lungs: Clear, no rales, rhonchi, wheezing  Heart: Regular rate and rhythm, no murmurs, gallops, or rubs  Abdomen: flat, bowel sounds positive, no tenderness, or organomegaly some slight ventral herniation--mild--some complaints of right costal angle swelling that comes and goes suggestive of hernia  Genitalia circumcised no hernia tests normal  Rectal exam negative   MS:-- tenderness lumbar spine L1-S1--anterior flexion 10° extension 10° lateral flexion rotation 10° straight leg lift pulling sensation back no radiculopathy---able squat half-way down eases arise--has some numbness in the right anterior lateral thigh area  Neuro: Alert, CN intact, oriented X 3 Romberg negative heel-toe slight swaying  Extremities: No cyanosis, clubbing, or edema         Assessment:       1. Screening cholesterol level    2. Chronic pain syndrome    3. History of lumbar fusion    4. DDD (degenerative disc disease), lumbar    5. DDD (degenerative disc disease), cervical    6. Anxiety    7. Hypertension, unspecified type    8. Pure hypercholesterolemia    9. Bilateral nephrolithiasis    10. Hypogonadism male    11. Morbid obesity with BMI of 40.0-44.9, adult    12. Diverticulosis    13. Hypogonadism in male        Plan:       Screening cholesterol level  -     Lipid Panel; Future; Expected date: 06/28/2022  -     CBC Auto Differential; Future; Expected date: 06/28/2022  -     TSH; Future; Expected date: 06/28/2022    Chronic pain syndrome  -     Lipid Panel; Future; Expected date: 06/28/2022  -     CBC Auto Differential; Future; Expected date: 06/28/2022  -     TSH; Future; Expected date: 06/28/2022    History of lumbar fusion  -     Lipid Panel; Future; Expected date: 06/28/2022  -     CBC Auto Differential; Future; Expected date: 06/28/2022  -     TSH; Future; Expected date:  06/28/2022    DDD (degenerative disc disease), lumbar  -     Lipid Panel; Future; Expected date: 06/28/2022  -     EMG W/ ULTRASOUND AND NERVE CONDUCTION TEST 1 Extremity; Future  -     CBC Auto Differential; Future; Expected date: 06/28/2022  -     TSH; Future; Expected date: 06/28/2022    DDD (degenerative disc disease), cervical  -     Lipid Panel; Future; Expected date: 06/28/2022  -     CBC Auto Differential; Future; Expected date: 06/28/2022  -     TSH; Future; Expected date: 06/28/2022    Anxiety  -     Lipid Panel; Future; Expected date: 06/28/2022  -     Ambulatory referral/consult to Psychiatry; Future; Expected date: 07/05/2022  -     CBC Auto Differential; Future; Expected date: 06/28/2022  -     Comprehensive Metabolic Panel; Future; Expected date: 06/28/2022  -     EKG 12-lead; Future  -     X-Ray Chest PA And Lateral; Future; Expected date: 06/28/2022  -     TSH; Future; Expected date: 06/28/2022  -     Sedimentation rate; Future; Expected date: 06/28/2022    Hypertension, unspecified type  -     Lipid Panel; Future; Expected date: 06/28/2022  -     CBC Auto Differential; Future; Expected date: 06/28/2022  -     TSH; Future; Expected date: 06/28/2022    Pure hypercholesterolemia  -     Lipid Panel; Future; Expected date: 06/28/2022  -     CBC Auto Differential; Future; Expected date: 06/28/2022  -     TSH; Future; Expected date: 06/28/2022    Bilateral nephrolithiasis  -     Lipid Panel; Future; Expected date: 06/28/2022  -     CBC Auto Differential; Future; Expected date: 06/28/2022  -     TSH; Future; Expected date: 06/28/2022    Hypogonadism male  -     Lipid Panel; Future; Expected date: 06/28/2022  -     CBC Auto Differential; Future; Expected date: 06/28/2022  -     TSH; Future; Expected date: 06/28/2022    Morbid obesity with BMI of 40.0-44.9, adult  -     Lipid Panel; Future; Expected date: 06/28/2022  -     CBC Auto Differential; Future; Expected date: 06/28/2022  -     TSH; Future; Expected  date: 06/28/2022    Diverticulosis  -     Lipid Panel; Future; Expected date: 06/28/2022  -     CBC Auto Differential; Future; Expected date: 06/28/2022  -     TSH; Future; Expected date: 06/28/2022    Hypogonadism in male  -     Lipid Panel; Future; Expected date: 06/28/2022  -     CBC Auto Differential; Future; Expected date: 06/28/2022  -     TSH; Future; Expected date: 06/28/2022  -     Testosterone, free; Future; Expected date: 06/28/2022  -     Testosterone; Future; Expected date: 06/28/2022    Other orders  -     EScitalopram oxalate (LEXAPRO) 10 MG tablet; Take 1 tablet (10 mg total) by mouth once daily.  Dispense: 30 tablet; Refill: 11        Multiple medical issues --almost positive review of systems  Chronic back pain--goes to chronic pain clinic---x-ray reviewed from 2020 of pelvic--shows lumbar fusion--with laminectomy--with rods going to both hips--patient states had recent x-ray had diagnostic imaging to be reviewed by Neuro surgery  Neuropathy right anterior thigh--quadriceps area--patient worried dysvascular will do EMG study--up feel neuropathy is coming from the back and prior back surgeries and possible scar tissue  Morbid obesity--exercise trying get down to ideal body weight--could consider gastric sleeve or bypass --patient was asking me for Adipex told I do not write it ecent   Multiple other medical issues not addressed this visit  Depression--needs to see a psychiatrist--multiple issues that need to be addressed should be on medication for depression--needs to see psychiatrist--if on SSRI such as Lexapro initially would be helpful with weight but in time but making gain weight may benefit from Wellbutrin  Hyperlipidemia patient on Lipitor tri cor  Decreased hearing left ear--cerumen impaction--unable to visualize ear canal  Hypertension/hyperlipidemia Vasotec and vasoretic  for blood pressure on  Lipitor for cholesterol--needs to exercise as tolerated try to get to ideal body weight--Needs  arm blood pressure cuff check blood pressure daily Needs blood pressure be 140/90 or less and greater than 90/60  History hypogonadism has been on testosterone   glaucoma/constipation/obesity/anxiety/depression  Lab done in December needs to redo lab in 6 months in June CBCs CMP lipid free and total testosterone  Needs to see neurosurgeon to evaluate lower back  Health maintenance tetanus pneumococcal vaccine flu shingles

## 2022-06-29 ENCOUNTER — TELEPHONE (OUTPATIENT)
Dept: PSYCHOLOGY | Facility: CLINIC | Age: 57
End: 2022-06-29
Payer: MEDICARE

## 2022-06-29 NOTE — TELEPHONE ENCOUNTER
----- Message from Marialuisa Doshi LCSW sent at 6/29/2022  1:33 PM CDT -----    ----- Message -----  From: Marie Uribe  Sent: 6/29/2022   1:13 PM CDT  To: Marialuisa Doshi LCSW    Anxiety [F41.9] referral in please call patient back to schedule

## 2022-07-02 RX ORDER — ENALAPRIL MALEATE AND HYDROCHLOROTHIAZIDE 10; 25 MG/1; MG/1
TABLET ORAL
Qty: 90 TABLET | Refills: 0 | Status: ON HOLD | OUTPATIENT
Start: 2022-07-02 | End: 2022-09-26 | Stop reason: HOSPADM

## 2022-07-02 RX ORDER — ATORVASTATIN CALCIUM 20 MG/1
20 TABLET, FILM COATED ORAL DAILY
Qty: 90 TABLET | Refills: 3 | Status: ON HOLD | OUTPATIENT
Start: 2022-07-02 | End: 2022-09-26 | Stop reason: SDUPTHER

## 2022-07-02 RX ORDER — FENOFIBRATE 145 MG/1
145 TABLET, FILM COATED ORAL DAILY
Qty: 90 TABLET | Refills: 3 | Status: SHIPPED | OUTPATIENT
Start: 2022-07-02 | End: 2023-05-31 | Stop reason: SDUPTHER

## 2022-07-02 RX ORDER — ALFUZOSIN HYDROCHLORIDE 10 MG/1
10 TABLET, EXTENDED RELEASE ORAL DAILY
Qty: 90 TABLET | Refills: 0 | Status: SHIPPED | OUTPATIENT
Start: 2022-07-02 | End: 2022-11-30 | Stop reason: SDUPTHER

## 2022-07-02 RX ORDER — ERGOCALCIFEROL 1.25 MG/1
50000 CAPSULE ORAL
Qty: 12 CAPSULE | Refills: 0 | Status: SHIPPED | OUTPATIENT
Start: 2022-07-02 | End: 2022-11-18

## 2022-07-02 RX ORDER — CYCLOBENZAPRINE HCL 10 MG
10 TABLET ORAL NIGHTLY PRN
Qty: 30 TABLET | Refills: 2 | Status: SHIPPED | OUTPATIENT
Start: 2022-07-02 | End: 2023-02-14 | Stop reason: SDUPTHER

## 2022-07-02 RX ORDER — GABAPENTIN 800 MG/1
800 TABLET ORAL 2 TIMES DAILY
Qty: 60 TABLET | Refills: 5 | Status: SHIPPED | OUTPATIENT
Start: 2022-07-02 | End: 2024-02-06 | Stop reason: SDUPTHER

## 2022-07-02 RX ORDER — SERTRALINE HYDROCHLORIDE 100 MG/1
100 TABLET, FILM COATED ORAL DAILY
Qty: 90 TABLET | Refills: 1 | Status: SHIPPED | OUTPATIENT
Start: 2022-07-02 | End: 2022-11-30 | Stop reason: SDUPTHER

## 2022-07-02 RX ORDER — OXYBUTYNIN CHLORIDE 10 MG/1
10 TABLET, EXTENDED RELEASE ORAL DAILY
Qty: 90 TABLET | Refills: 1 | Status: SHIPPED | OUTPATIENT
Start: 2022-07-02 | End: 2022-09-21 | Stop reason: CLARIF

## 2022-07-08 ENCOUNTER — TELEPHONE (OUTPATIENT)
Dept: PRIMARY CARE CLINIC | Facility: CLINIC | Age: 57
End: 2022-07-08
Payer: MEDICARE

## 2022-07-08 NOTE — TELEPHONE ENCOUNTER
----- Message from Margarita Null sent at 7/8/2022 10:43 AM CDT -----  Contact: [phone]  Pt would like you to call him to schedule nurse visit  for EKG. He would also like to know if anxiety medication can be called in for him to take b/c of pain from having EMG done. He would like to have emg and ekg done the same day. Can you please assist?      [facility] [place] - [address]  [address] [place] [address]  Phone: [phone] Fax: [phone]

## 2022-07-11 ENCOUNTER — TELEPHONE (OUTPATIENT)
Dept: PRIMARY CARE CLINIC | Facility: CLINIC | Age: 57
End: 2022-07-11
Payer: MEDICARE

## 2022-07-11 DIAGNOSIS — E29.1 HYPOGONADISM MALE: Primary | ICD-10-CM

## 2022-07-11 DIAGNOSIS — I10 HYPERTENSION, UNSPECIFIED TYPE: ICD-10-CM

## 2022-07-11 NOTE — TELEPHONE ENCOUNTER
----- Message from Marie Uribe sent at 7/11/2022 11:13 AM CDT -----  Please call patient back called to schedule his EMG and EKG needs to talk with nurse about medicine to take before his test and also said that his refills are not right

## 2022-07-12 NOTE — TELEPHONE ENCOUNTER
Called patient and gave him Dr. Armendariz's message. Gave patient number to contact the hospital to see if the EMG tech can tell him if he can take a muscle relaxer before the EMG.

## 2022-07-13 ENCOUNTER — TELEPHONE (OUTPATIENT)
Dept: NEUROLOGY | Facility: CLINIC | Age: 57
End: 2022-07-13
Payer: MEDICARE

## 2022-07-13 NOTE — TELEPHONE ENCOUNTER
Patient does not want to schedule the EMG Procedure at this time. He has had many of these studies over the years and said that it is entirely too painful to undergo. Patient went into a lengthy explanation of why he can not do this study without medication to sedate him. He does not feel like this is a severe enough problem.  Symptoms have improved since his last Dr's visit.  He will not have testing performed at this time. Patient said that he will contact  if symptoms worsen and they can readdress this at that time.

## 2022-07-27 ENCOUNTER — TELEPHONE (OUTPATIENT)
Dept: PRIMARY CARE CLINIC | Facility: CLINIC | Age: 57
End: 2022-07-27
Payer: MEDICARE

## 2022-07-27 NOTE — TELEPHONE ENCOUNTER
----- Message from Alena Rosenberg sent at 7/27/2022  8:25 AM CDT -----  Contact: [phone]  Patient would like to know if blood work has been sent to [facility], please call to confirm.

## 2022-07-29 ENCOUNTER — TELEPHONE (OUTPATIENT)
Dept: PRIMARY CARE CLINIC | Facility: CLINIC | Age: 57
End: 2022-07-29
Payer: MEDICARE

## 2022-07-29 NOTE — TELEPHONE ENCOUNTER
----- Message from Marie Uribe sent at 7/29/2022  3:14 PM CDT -----  Please send Testosterone  order  to [facility] well

## 2022-08-15 ENCOUNTER — TELEPHONE (OUTPATIENT)
Dept: PRIMARY CARE CLINIC | Facility: CLINIC | Age: 57
End: 2022-08-15
Payer: MEDICARE

## 2022-08-15 NOTE — TELEPHONE ENCOUNTER
----- Message from Sisi Smiley sent at 8/15/2022  1:50 PM CDT -----  Please call patient he states he is having chest pain and needs advise

## 2022-08-15 NOTE — TELEPHONE ENCOUNTER
----- Message from Senait Cummings sent at 8/15/2022  9:33 AM CDT -----  Contact: self/[phone]  Pt called in regards to getting an x ray and a EKG due to having chest pain and wanted to know what's the next step and wanted to know if he could  get a Rx to help with the chest pain until he can se the dr or a cardio. Call back    Please advice

## 2022-08-15 NOTE — TELEPHONE ENCOUNTER
Returned call to patient. I advised patient to go to ER for chest pains. Patient stated that he wants to come in for the appt tomorrow and see what Dr. Armendariz wants him to do.

## 2022-08-16 ENCOUNTER — OFFICE VISIT (OUTPATIENT)
Dept: PRIMARY CARE CLINIC | Facility: CLINIC | Age: 57
End: 2022-08-16
Payer: MEDICARE

## 2022-08-16 VITALS
BODY MASS INDEX: 42.6 KG/M2 | HEART RATE: 117 BPM | WEIGHT: 281.06 LBS | RESPIRATION RATE: 18 BRPM | TEMPERATURE: 96 F | HEIGHT: 68 IN | SYSTOLIC BLOOD PRESSURE: 120 MMHG | OXYGEN SATURATION: 95 % | DIASTOLIC BLOOD PRESSURE: 70 MMHG

## 2022-08-16 DIAGNOSIS — E78.00 PURE HYPERCHOLESTEROLEMIA: ICD-10-CM

## 2022-08-16 DIAGNOSIS — M51.36 DDD (DEGENERATIVE DISC DISEASE), LUMBAR: ICD-10-CM

## 2022-08-16 DIAGNOSIS — R05.3 CHRONIC COUGH: ICD-10-CM

## 2022-08-16 DIAGNOSIS — E66.01 MORBID OBESITY WITH BMI OF 40.0-44.9, ADULT: ICD-10-CM

## 2022-08-16 DIAGNOSIS — R07.89 ATYPICAL CHEST PAIN: Primary | ICD-10-CM

## 2022-08-16 DIAGNOSIS — M50.30 DDD (DEGENERATIVE DISC DISEASE), CERVICAL: ICD-10-CM

## 2022-08-16 DIAGNOSIS — M96.1 POST LAMINECTOMY SYNDROME: ICD-10-CM

## 2022-08-16 DIAGNOSIS — E29.1 HYPOGONADISM MALE: ICD-10-CM

## 2022-08-16 DIAGNOSIS — Z98.1 HISTORY OF LUMBAR FUSION: ICD-10-CM

## 2022-08-16 DIAGNOSIS — R06.02 SHORTNESS OF BREATH: ICD-10-CM

## 2022-08-16 DIAGNOSIS — N20.0 BILATERAL NEPHROLITHIASIS: ICD-10-CM

## 2022-08-16 DIAGNOSIS — R55 SYNCOPE AND COLLAPSE: ICD-10-CM

## 2022-08-16 DIAGNOSIS — F41.9 ANXIETY: ICD-10-CM

## 2022-08-16 DIAGNOSIS — S39.012D LUMBOSACRAL STRAIN, SUBSEQUENT ENCOUNTER: ICD-10-CM

## 2022-08-16 DIAGNOSIS — I10 HYPERTENSION, UNSPECIFIED TYPE: ICD-10-CM

## 2022-08-16 PROBLEM — E66.9 OBESITY: Status: RESOLVED | Noted: 2020-01-13 | Resolved: 2022-08-16

## 2022-08-16 PROCEDURE — 4010F ACE/ARB THERAPY RXD/TAKEN: CPT | Mod: CPTII,S$GLB,, | Performed by: FAMILY MEDICINE

## 2022-08-16 PROCEDURE — 1159F PR MEDICATION LIST DOCUMENTED IN MEDICAL RECORD: ICD-10-PCS | Mod: CPTII,S$GLB,, | Performed by: FAMILY MEDICINE

## 2022-08-16 PROCEDURE — 4010F PR ACE/ARB THEARPY RXD/TAKEN: ICD-10-PCS | Mod: CPTII,S$GLB,, | Performed by: FAMILY MEDICINE

## 2022-08-16 PROCEDURE — 3074F SYST BP LT 130 MM HG: CPT | Mod: CPTII,S$GLB,, | Performed by: FAMILY MEDICINE

## 2022-08-16 PROCEDURE — 3074F PR MOST RECENT SYSTOLIC BLOOD PRESSURE < 130 MM HG: ICD-10-PCS | Mod: CPTII,S$GLB,, | Performed by: FAMILY MEDICINE

## 2022-08-16 PROCEDURE — 3008F PR BODY MASS INDEX (BMI) DOCUMENTED: ICD-10-PCS | Mod: CPTII,S$GLB,, | Performed by: FAMILY MEDICINE

## 2022-08-16 PROCEDURE — 99999 PR PBB SHADOW E&M-EST. PATIENT-LVL V: ICD-10-PCS | Mod: PBBFAC,,, | Performed by: FAMILY MEDICINE

## 2022-08-16 PROCEDURE — 99214 PR OFFICE/OUTPT VISIT, EST, LEVL IV, 30-39 MIN: ICD-10-PCS | Mod: S$GLB,,, | Performed by: FAMILY MEDICINE

## 2022-08-16 PROCEDURE — 1159F MED LIST DOCD IN RCRD: CPT | Mod: CPTII,S$GLB,, | Performed by: FAMILY MEDICINE

## 2022-08-16 PROCEDURE — 3078F DIAST BP <80 MM HG: CPT | Mod: CPTII,S$GLB,, | Performed by: FAMILY MEDICINE

## 2022-08-16 PROCEDURE — 99214 OFFICE O/P EST MOD 30 MIN: CPT | Mod: S$GLB,,, | Performed by: FAMILY MEDICINE

## 2022-08-16 PROCEDURE — 3008F BODY MASS INDEX DOCD: CPT | Mod: CPTII,S$GLB,, | Performed by: FAMILY MEDICINE

## 2022-08-16 PROCEDURE — 3078F PR MOST RECENT DIASTOLIC BLOOD PRESSURE < 80 MM HG: ICD-10-PCS | Mod: CPTII,S$GLB,, | Performed by: FAMILY MEDICINE

## 2022-08-16 PROCEDURE — 99999 PR PBB SHADOW E&M-EST. PATIENT-LVL V: CPT | Mod: PBBFAC,,, | Performed by: FAMILY MEDICINE

## 2022-08-16 RX ORDER — METOPROLOL SUCCINATE 25 MG/1
25 TABLET, EXTENDED RELEASE ORAL DAILY
Qty: 30 TABLET | Refills: 11 | Status: SHIPPED | OUTPATIENT
Start: 2022-08-16 | End: 2023-07-25

## 2022-08-16 NOTE — PROGRESS NOTES
Subjective:       Patient ID: Collin Almaguer Sr. is a 57 y.o. male.    Chief Complaint: Chest Pain     HPI 57 White male--c/o SOB --disabled since  has gain weight---gets tired out easily doing different chores around the house--- chest pain since Aug 4th stopped 5 PM yesterday. --quality --felt like a pressure sensation like something going to explode in chest--severity --8/10---constant--not sure what made it worse or made it better--went few days without eating.   Occas one side face get numb and arm numb and lock jaw -- last few seconds and leaves.  Pt used drink 5 hr [employer] one a day x 20 yrs    Pt goes to pain management .  Told has 4 rods--2 rods are in lumbar spine with fusion--and laminectomy--has 2 rods in hips        ROS:   Skin: no psoriasis, eczema, skin cancer--Skin Cancer left cheek--Dr Wise-seen past not seen recently   HEENT: +occs  headache, ocular pain, blurred vision, diplopia, epistaxis, hoarseness change in voice, thyroid trouble +glaucoma itis bilaterally left greater than right  Lung: No pneumonia, +asthma as child ,no  Tb, wheezing, SOB, no smoking not sure if wheezes   Heart: no chest pain,no ankle edema, palpitations, MI, tiffanie murmur,+hypertension,+ hyperlipidemia--no stent bypass arrhythmia--never seen by cardiologist   Abdomen: +nausea, no  vomiting, diarrhea,+ constipation-a lot better no  ulcers, hepatitis, gallbladder disease, melena, hematochezia, hematemesis   : no UTI, renal disease, stones prostate  MS: no fractures, O/A, lupus, rheumatoid, gout--neck and back problem--old xray  OK --laminectomy and rods hips --last fall patient gel over pile of debris in yd 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  Neuro: + Dizziness, no  LOC, seizures   No diabetes, no anemia, +anxiety, + depression on meds Disabled  --being home all the time makes patient depressed--found father is at home  2014  , 5 children, disability lives alone         Objective:   Physical Exam:  General: Well nourished, well developed, no acute distress + morbid obesity  Skin:  No lesion   HEENT: Eyes PERRLA, EOM intact, wax blocking left ear canal nose patent, throat non-erythematous ears TMs clear  NECK: Supple, no bruits, No JVD, no nodes   Lungs: Clear, no rales, rhonchi, wheezing  Heart: Regular rate and rhythm, no murmurs, gallops, or rubs  Abdomen: flat, bowel sounds positive, no tenderness, or organomegaly some slight ventral herniation--mild--some complaints of right costal angle swelling that comes and goes suggestive of hernia  Genitalia circumcised no hernia tests normal  Rectal exam negative   MS:-- tenderness lumbar spine L1-S1--anterior flexion 10° extension 10° lateral flexion rotation 10° straight leg lift pulling sensation back no radiculopathy---able squat snf down eases arise--has some numbness in the right anterior lateral thigh area  Neuro: Alert, CN intact, oriented X 3 Romberg negative heel-toe slight swaying due back issues   Extremities: No cyanosis, clubbing, or edema         Assessment:       1. Atypical chest pain    2. Shortness of breath    3. Morbid obesity with BMI of 40.0-44.9, adult    4. Lumbosacral strain, subsequent encounter    5. Post laminectomy syndrome    6. Hypogonadism male    7. Bilateral nephrolithiasis    8. Pure hypercholesterolemia    9. Hypertension, unspecified type    10. Chronic cough    11. Anxiety    12. History of lumbar fusion    13. DDD (degenerative disc disease), lumbar    14. DDD (degenerative disc disease), cervical    15. Syncope and collapse         Plan:       Atypical chest pain  -     EKG 12-lead; Future  -     Ambulatory referral/consult to Cardiology; Future; Expected date: 08/23/2022  -     Echo  -     Holter monitor - 24 hour; Future  -     US Carotid Bilateral; Future; Expected date: 08/16/2022    Shortness of breath  -     US Carotid Bilateral; Future; Expected date: 08/16/2022    Morbid obesity  with BMI of 40.0-44.9, adult  -     US Carotid Bilateral; Future; Expected date: 08/16/2022    Lumbosacral strain, subsequent encounter  -     US Carotid Bilateral; Future; Expected date: 08/16/2022    Post laminectomy syndrome  -     US Carotid Bilateral; Future; Expected date: 08/16/2022    Hypogonadism male  -     US Carotid Bilateral; Future; Expected date: 08/16/2022    Bilateral nephrolithiasis  -     US Carotid Bilateral; Future; Expected date: 08/16/2022    Pure hypercholesterolemia  -     US Carotid Bilateral; Future; Expected date: 08/16/2022    Hypertension, unspecified type  -     US Carotid Bilateral; Future; Expected date: 08/16/2022    Chronic cough  -     US Carotid Bilateral; Future; Expected date: 08/16/2022    Anxiety  -     US Carotid Bilateral; Future; Expected date: 08/16/2022    History of lumbar fusion  -     US Carotid Bilateral; Future; Expected date: 08/16/2022    DDD (degenerative disc disease), lumbar  -     US Carotid Bilateral; Future; Expected date: 08/16/2022    DDD (degenerative disc disease), cervical  -     US Carotid Bilateral; Future; Expected date: 08/16/2022    Syncope and collapse   -     US Carotid Bilateral; Future; Expected date: 08/16/2022    Other orders  -     metoprolol succinate (TOPROL-XL) 25 MG 24 hr tablet; Take 1 tablet (25 mg total) by mouth once daily.  Dispense: 30 tablet; Refill: 11        Multiple medical issues --almost positive review of systems  Chronic back pain--goes to chronic pain clinic---x-ray reviewed from 2020 of pelvic--shows lumbar fusion--with laminectomy--with rods going to both hips--patient states had recent x-ray had diagnostic imaging to be reviewed by Neuro surgery  Neuropathy right anterior thigh--quadriceps area--patient worried dysvascular will do EMG study--up feel neuropathy is coming from the back and prior back surgeries and possible scar tissue  Morbid obesity--exercise trying get down to ideal body weight--could consider gastric  sleeve or bypass --patient was asking me for Adipex told I do not write it ecent   Multiple other medical issues not addressed this visit  Depression--needs to see a psychiatrist--multiple issues that need to be addressed should be on medication for depression--needs to see psychiatrist--if on SSRI such as Lexapro initially would be helpful with weight but in time but making gain weight may benefit from Wellbutrin  Hyperlipidemia patient on Lipitor tri cor  Decreased hearing left ear--cerumen impaction--unable to visualize ear canal  Hypertension/hyperlipidemia Vasotec and vasoretic  for blood pressure on  Lipitor for cholesterol--needs to exercise as tolerated try to get to ideal body weight--Needs arm blood pressure cuff check blood pressure daily Needs blood pressure be 140/90 or less and greater than 90/60  History hypogonadism has been on testosterone   glaucoma/constipation/obesity/anxiety/depression  Lab done in December needs to redo lab in 6 months in June CBCs CMP lipid free and total testosterone  Needs to see neurosurgeon to evaluate lower back  Health maintenance tetanus pneumococcal vaccine flu shingles

## 2022-08-25 LAB
AORTIC ROOT ANNULUS: 2.85 CM
AORTIC VALVE CUSP SEPERATION: 17.05 CM
ASCENDING AORTA: 2.93 CM
AV INDEX (PROSTH): 0.71
AV MEAN GRADIENT: 5 MMHG
AV PEAK GRADIENT: 9 MMHG
AV VALVE AREA: 1.57 CM2
AV VELOCITY RATIO: 0.76
CV ECHO LV RWT: 0.57 CM
DOP CALC AO PEAK VEL: 1.48 M/S
DOP CALC AO VTI: 29.7 CM
DOP CALC LVOT AREA: 2.2 CM2
DOP CALC LVOT DIAMETER: 1.68 CM
DOP CALC LVOT PEAK VEL: 1.12 M/S
DOP CALC LVOT STROKE VOLUME: 46.75 CM3
DOP CALCLVOT PEAK VEL VTI: 21.1 CM
E WAVE DECELERATION TIME: 223.2 MSEC
E/A RATIO: 0.72
E/E' RATIO: 6.38 M/S
ECHO LV POSTERIOR WALL: 1.18 CM (ref 0.6–1.1)
EJECTION FRACTION: 40 %
FRACTIONAL SHORTENING: 20 % (ref 28–44)
INTERVENTRICULAR SEPTUM: 1.28 CM (ref 0.6–1.1)
IVRT: 57.09 MSEC
LA MAJOR: 5.32 CM
LA MINOR: 2.72 CM
LEFT ATRIUM VOLUME MOD: 0 CM3
LEFT INTERNAL DIMENSION IN SYSTOLE: 3.3 CM (ref 2.1–4)
LEFT VENTRICLE DIASTOLIC VOLUME: 75.52 ML
LEFT VENTRICLE SYSTOLIC VOLUME: 43.98 ML
LEFT VENTRICULAR INTERNAL DIMENSION IN DIASTOLE: 4.13 CM (ref 3.5–6)
LEFT VENTRICULAR MASS: 180.1 G
LV LATERAL E/E' RATIO: 5.1 M/S
LV SEPTAL E/E' RATIO: 8.5 M/S
LVOT MG: 2.94 MMHG
LVOT MV: 0.81 CM/S
MV PEAK A VEL: 0.71 M/S
MV PEAK E VEL: 0.51 M/S
MV STENOSIS PRESSURE HALF TIME: 69.23 MS
MV VALVE AREA P 1/2 METHOD: 3.18 CM2
PISA TR MAX VEL: 2.67 M/S
PV PEAK VELOCITY: 1.15 CM/S
RA MAJOR: 4.02 CM
RA WIDTH: 3.02 CM
RIGHT VENTRICULAR END-DIASTOLIC DIMENSION: 2.53 CM
SINUS: 2.7 CM
TDI LATERAL: 0.1 M/S
TDI SEPTAL: 0.06 M/S
TDI: 0.08 M/S
TR MAX PG: 29 MMHG

## 2022-08-29 DIAGNOSIS — R93.1 ABNORMAL ECHOCARDIOGRAM: Primary | ICD-10-CM

## 2022-09-01 ENCOUNTER — OFFICE VISIT (OUTPATIENT)
Dept: CARDIOLOGY | Facility: CLINIC | Age: 57
End: 2022-09-01
Payer: MEDICARE

## 2022-09-01 ENCOUNTER — PATIENT MESSAGE (OUTPATIENT)
Dept: CARDIOLOGY | Facility: CLINIC | Age: 57
End: 2022-09-01

## 2022-09-01 VITALS
HEIGHT: 68 IN | BODY MASS INDEX: 40.84 KG/M2 | OXYGEN SATURATION: 95 % | HEART RATE: 104 BPM | DIASTOLIC BLOOD PRESSURE: 73 MMHG | SYSTOLIC BLOOD PRESSURE: 116 MMHG | WEIGHT: 269.5 LBS

## 2022-09-01 DIAGNOSIS — R07.89 ATYPICAL CHEST PAIN: Primary | ICD-10-CM

## 2022-09-01 DIAGNOSIS — R93.1 ABNORMAL ECHOCARDIOGRAM: ICD-10-CM

## 2022-09-01 PROCEDURE — 1160F PR REVIEW ALL MEDS BY PRESCRIBER/CLIN PHARMACIST DOCUMENTED: ICD-10-PCS | Mod: CPTII,S$GLB,, | Performed by: NURSE PRACTITIONER

## 2022-09-01 PROCEDURE — 4010F ACE/ARB THERAPY RXD/TAKEN: CPT | Mod: CPTII,S$GLB,, | Performed by: NURSE PRACTITIONER

## 2022-09-01 PROCEDURE — 99214 PR OFFICE/OUTPT VISIT, EST, LEVL IV, 30-39 MIN: ICD-10-PCS | Mod: S$GLB,,, | Performed by: NURSE PRACTITIONER

## 2022-09-01 PROCEDURE — 1159F PR MEDICATION LIST DOCUMENTED IN MEDICAL RECORD: ICD-10-PCS | Mod: CPTII,S$GLB,, | Performed by: NURSE PRACTITIONER

## 2022-09-01 PROCEDURE — 99214 OFFICE O/P EST MOD 30 MIN: CPT | Mod: S$GLB,,, | Performed by: NURSE PRACTITIONER

## 2022-09-01 PROCEDURE — 3074F PR MOST RECENT SYSTOLIC BLOOD PRESSURE < 130 MM HG: ICD-10-PCS | Mod: CPTII,S$GLB,, | Performed by: NURSE PRACTITIONER

## 2022-09-01 PROCEDURE — 3008F BODY MASS INDEX DOCD: CPT | Mod: CPTII,S$GLB,, | Performed by: NURSE PRACTITIONER

## 2022-09-01 PROCEDURE — 93010 EKG 12-LEAD: ICD-10-PCS | Mod: S$GLB,,, | Performed by: INTERNAL MEDICINE

## 2022-09-01 PROCEDURE — 1159F MED LIST DOCD IN RCRD: CPT | Mod: CPTII,S$GLB,, | Performed by: NURSE PRACTITIONER

## 2022-09-01 PROCEDURE — 4010F PR ACE/ARB THEARPY RXD/TAKEN: ICD-10-PCS | Mod: CPTII,S$GLB,, | Performed by: NURSE PRACTITIONER

## 2022-09-01 PROCEDURE — 99999 PR PBB SHADOW E&M-EST. PATIENT-LVL V: CPT | Mod: PBBFAC,,, | Performed by: NURSE PRACTITIONER

## 2022-09-01 PROCEDURE — 93005 EKG 12-LEAD: ICD-10-PCS | Mod: S$GLB,,, | Performed by: FAMILY MEDICINE

## 2022-09-01 PROCEDURE — 99999 PR PBB SHADOW E&M-EST. PATIENT-LVL V: ICD-10-PCS | Mod: PBBFAC,,, | Performed by: NURSE PRACTITIONER

## 2022-09-01 PROCEDURE — 3078F DIAST BP <80 MM HG: CPT | Mod: CPTII,S$GLB,, | Performed by: NURSE PRACTITIONER

## 2022-09-01 PROCEDURE — 93010 ELECTROCARDIOGRAM REPORT: CPT | Mod: S$GLB,,, | Performed by: INTERNAL MEDICINE

## 2022-09-01 PROCEDURE — 3074F SYST BP LT 130 MM HG: CPT | Mod: CPTII,S$GLB,, | Performed by: NURSE PRACTITIONER

## 2022-09-01 PROCEDURE — 93005 ELECTROCARDIOGRAM TRACING: CPT | Mod: S$GLB,,, | Performed by: FAMILY MEDICINE

## 2022-09-01 PROCEDURE — 3008F PR BODY MASS INDEX (BMI) DOCUMENTED: ICD-10-PCS | Mod: CPTII,S$GLB,, | Performed by: NURSE PRACTITIONER

## 2022-09-01 PROCEDURE — 1160F RVW MEDS BY RX/DR IN RCRD: CPT | Mod: CPTII,S$GLB,, | Performed by: NURSE PRACTITIONER

## 2022-09-01 PROCEDURE — 3078F PR MOST RECENT DIASTOLIC BLOOD PRESSURE < 80 MM HG: ICD-10-PCS | Mod: CPTII,S$GLB,, | Performed by: NURSE PRACTITIONER

## 2022-09-01 NOTE — PATIENT INSTRUCTIONS
Continue to take your medications as you have    I am referring you to Dr. Molina to discuss angiogram.    Liz will be contacting you to set up an appointment.     Avoid ibuprofen for now    Your echocardiogram suggests that you have had a heart attack and this is affecting how your heart muscle works.   Am concerned you have a blockage in a heart vessel that is causing this

## 2022-09-01 NOTE — PROGRESS NOTES
Cardiology    9/1/2022  11:52 AM    Problem list  Patient Active Problem List   Diagnosis    DDD (degenerative disc disease), cervical    DDD (degenerative disc disease), lumbar    Chronic lumbar radiculopathy    Post laminectomy syndrome    Lumbar facet arthropathy    Myalgia and myositis    Headache    Glaucoma    Skin cancer    Urinary incontinence    Gynecomastia    Chronic cough    Shortness of breath    Cervical strain    Lumbosacral strain    Chronic pain syndrome    Hypertension    Hyperlipidemia    Constipation    Left lower quadrant abdominal pain    Dizziness    Atypical chest pain    Anxiety    Ventral hernia without obstruction or gangrene    Depression    Hypogonadism male    Chronic renal impairment, stage 1    Right nephrolithiasis    Contact dermatitis    Impacted cerumen of left ear    Bright red blood per rectum    History of lumbar fusion    Spinal stenosis    Bilateral nephrolithiasis    Diverticulosis of colon    Morbid obesity with BMI of 40.0-44.9, adult    Colonic polyp    Diverticulosis       CC:  Abnormal echo    HPI:  Disabled from back injury  Dad passed away in 4/2014  Has started and stopped antidepressants  Has anxiety and the meds calm him down, then he feels like a vegetable and he stops the meds.   Brother's wife worked for a heart doctor  He has chest pain but did not collapse or anything  Drinks 5 hour energy drinks  March 3rd 21 had a prostate biopsy and his testosterone was low.  He got injections every 2 weeks.  He had blood work done and things were abnormal.  Blood was very thick and it was recommended that he donate 3 pints of blood. He refused.   Also Took a mood medicine that started with an e and only took it 2 days      Medications  Current Outpatient Medications   Medication Sig Dispense Refill    alfuzosin (UROXATRAL) 10 mg Tb24 Take 1 tablet (10 mg total) by mouth once daily. 90 tablet 0    atorvastatin (LIPITOR) 20 MG tablet Take 1 tablet (20 mg total) by  mouth once daily. 90 tablet 3    cyclobenzaprine (FLEXERIL) 10 MG tablet Take 1 tablet (10 mg total) by mouth nightly as needed for Muscle spasms. 30 tablet 2    enalapriL-hydrochlorothiazide (VASERETIC) 10-25 mg per tablet Take 1 tablet by mouth once daily 90 tablet 0    ergocalciferol (ERGOCALCIFEROL) 50,000 unit Cap Take 1 capsule (50,000 Units total) by mouth every 7 days. 12 capsule 0    fenofibrate (TRICOR) 145 MG tablet Take 1 tablet (145 mg total) by mouth once daily. 90 tablet 3    gabapentin (NEURONTIN) 800 MG tablet Take 1 tablet (800 mg total) by mouth 2 (two) times daily. 60 tablet 5    HYDROcodone-acetaminophen (NORCO)  mg per tablet Take 1 tablet by mouth every 8 (eight) hours as needed.      ibuprofen (ADVIL,MOTRIN) 800 MG tablet Take 1 tablet by mouth every 6 (six) hours as needed.      latanoprost 0.005 % ophthalmic solution Place 1 drop into both eyes every evening.      metoprolol succinate (TOPROL-XL) 25 MG 24 hr tablet Take 1 tablet (25 mg total) by mouth once daily. 30 tablet 11    sertraline (ZOLOFT) 100 MG tablet Take 1 tablet (100 mg total) by mouth once daily. 90 tablet 1    betamethasone valerate 0.1% (VALISONE) 0.1 % Crea Apply topically 2 (two) times daily. (Patient not taking: Reported on 9/1/2022) 60 g 2    diazePAM (VALIUM) 5 MG tablet Take 1-2 tabs PO 30-60 minutes before MRI (Patient not taking: Reported on 9/1/2022) 5 tablet 0    EScitalopram oxalate (LEXAPRO) 10 MG tablet Take 1 tablet (10 mg total) by mouth once daily. (Patient not taking: Reported on 9/1/2022) 30 tablet 11    oxybutynin (DITROPAN-XL) 10 MG 24 hr tablet Take 1 tablet (10 mg total) by mouth once daily. (Patient not taking: Reported on 9/1/2022) 90 tablet 1    testosterone cypionate (DEPOTESTOTERONE CYPIONATE) 200 mg/mL injection        No current facility-administered medications for this visit.      Prior to Admission medications    Medication Sig Start Date End Date Taking? Authorizing Provider    alfuzosin (UROXATRAL) 10 mg Tb24 Take 1 tablet (10 mg total) by mouth once daily. 7/2/22  Yes Jaret Armendariz MD   atorvastatin (LIPITOR) 20 MG tablet Take 1 tablet (20 mg total) by mouth once daily. 7/2/22  Yes Jaret Armendariz MD   cyclobenzaprine (FLEXERIL) 10 MG tablet Take 1 tablet (10 mg total) by mouth nightly as needed for Muscle spasms. 7/2/22  Yes Jaret Armendariz MD   enalapriL-hydrochlorothiazide (VASERETIC) 10-25 mg per tablet Take 1 tablet by mouth once daily 7/2/22  Yes Jaret Armendariz MD   ergocalciferol (ERGOCALCIFEROL) 50,000 unit Cap Take 1 capsule (50,000 Units total) by mouth every 7 days. 7/2/22  Yes Jaret Armendariz MD   fenofibrate (TRICOR) 145 MG tablet Take 1 tablet (145 mg total) by mouth once daily. 7/2/22  Yes Jaret Armendariz MD   gabapentin (NEURONTIN) 800 MG tablet Take 1 tablet (800 mg total) by mouth 2 (two) times daily. 7/2/22  Yes Jaret Armendariz MD   HYDROcodone-acetaminophen (NORCO)  mg per tablet Take 1 tablet by mouth every 8 (eight) hours as needed. 1/2/20  Yes Historical Provider   ibuprofen (ADVIL,MOTRIN) 800 MG tablet Take 1 tablet by mouth every 6 (six) hours as needed. 1/2/20  Yes Historical Provider   latanoprost 0.005 % ophthalmic solution Place 1 drop into both eyes every evening. 12/4/19  Yes Historical Provider   metoprolol succinate (TOPROL-XL) 25 MG 24 hr tablet Take 1 tablet (25 mg total) by mouth once daily. 8/16/22 8/16/23 Yes Jaret Armendariz MD   sertraline (ZOLOFT) 100 MG tablet Take 1 tablet (100 mg total) by mouth once daily. 7/2/22  Yes Jaret Armendariz MD   betamethasone valerate 0.1% (VALISONE) 0.1 % Crea Apply topically 2 (two) times daily.  Patient not taking: Reported on 9/1/2022 3/31/20   Jaret Armendariz MD   diazePAM (VALIUM) 5 MG tablet Take 1-2 tabs PO 30-60 minutes before MRI  Patient not taking: Reported on 9/1/2022 12/11/20   Aleyda Estrada PA-C   EScitalopram oxalate (LEXAPRO) 10 MG tablet Take 1 tablet  (10 mg total) by mouth once daily.  Patient not taking: Reported on 9/1/2022 6/28/22 6/28/23  Jaret Armendariz MD   oxybutynin (DITROPAN-XL) 10 MG 24 hr tablet Take 1 tablet (10 mg total) by mouth once daily.  Patient not taking: Reported on 9/1/2022 7/2/22   Jaret Armendariz MD   testosterone cypionate (DEPOTESTOTERONE CYPIONATE) 200 mg/mL injection  3/2/20   Historical Provider         History  Past Medical History:   Diagnosis Date    Depression     Hyperlipemia     Hypertension      Past Surgical History:   Procedure Laterality Date    COLONOSCOPY N/A 1/4/2021    Procedure: COLONOSCOPY;  Surgeon: Eriberto Ladd MD;  Location: [facility];  Service: Endoscopy;  Laterality: N/A;    COLONOSCOPY W/ POLYPECTOMY  01/04/2021    colonoscopy w/ polypectomy per  01/04/2021    HERNIA REPAIR      LAMINECTOMY      TONSILLECTOMY      1974     Social History     Socioeconomic History    Marital status: Legally    Tobacco Use    Smoking status: Never    Smokeless tobacco: Never   Substance and Sexual Activity    Alcohol use: No    Drug use: No         Allergies  Review of patient's allergies indicates:   Allergen Reactions    Fish containing products Other (See Comments)     TUNA FISH  TUNA FISH    Pcn [penicillins]          Review of Systems   Review of Systems   Constitutional: Positive for diaphoresis and malaise/fatigue.   HENT: Negative.     Cardiovascular:  Positive for chest pain (with heat exposure). Negative for claudication, dyspnea on exertion, irregular heartbeat, leg swelling, near-syncope, orthopnea, palpitations, paroxysmal nocturnal dyspnea and syncope.   Respiratory:  Positive for shortness of breath.    Endocrine: Negative for polydipsia, polyphagia and polyuria.   Hematologic/Lymphatic: Does not bruise/bleed easily.   Gastrointestinal:  Negative for bloating, nausea and vomiting.   Genitourinary: Negative.    Neurological:  Positive for weakness. Negative for excessive daytime  sleepiness, dizziness, light-headedness and loss of balance.   Psychiatric/Behavioral:  The patient is not nervous/anxious.    Allergic/Immunologic: Negative.        Physical Exam  Wt Readings from Last 1 Encounters:   09/01/22 122.3 kg (269 lb 8.2 oz)     BP Readings from Last 3 Encounters:   09/01/22 116/73   08/16/22 120/70   06/28/22 102/80     Pulse Readings from Last 1 Encounters:   09/01/22 104     Body mass index is 40.98 kg/m².    Physical Exam  Vitals and nursing note reviewed.   Constitutional:       Appearance: Normal appearance.      Comments: BMI 41   HENT:      Head: Normocephalic and atraumatic.      Mouth/Throat:      Mouth: Mucous membranes are moist.   Eyes:      Pupils: Pupils are equal, round, and reactive to light.   Cardiovascular:      Rate and Rhythm: Regular rhythm. Tachycardia present.      Pulses:           Radial pulses are 2+ on the right side and 2+ on the left side.        Dorsalis pedis pulses are 2+ on the right side and 2+ on the left side.        Posterior tibial pulses are 2+ on the right side and 2+ on the left side.      Heart sounds: No murmur heard.  Pulmonary:      Effort: Pulmonary effort is normal. No respiratory distress.      Breath sounds: Normal breath sounds.   Abdominal:      General: There is no distension.      Tenderness: There is no abdominal tenderness.   Musculoskeletal:      Cervical back: Normal range of motion.      Right lower leg: No edema.      Left lower leg: No edema.   Skin:     General: Skin is warm and dry.      Findings: No erythema.   Neurological:      General: No focal deficit present.      Mental Status: He is alert.   Psychiatric:         Mood and Affect: Mood normal.         Behavior: Behavior normal.                 Problem List Items Addressed This Visit          Cardiac/Vascular    Abnormal echocardiogram    Overview     Depressed EF 40%  Significant wall motion abnormalities  Discussed with patient that it is suspected he may have had a MI  and this has led to the abnormal wall motion.  Case discussed with Stefano Molina and Vivi- based on his abnormal echo and chest pain along with family history of CVD recommend angiogram.  Appt made for patient, angio planned for late September         Current Assessment & Plan     As above            Other    Atypical chest pain - Primary    Relevant Orders    Ambulatory referral/consult to Interventional Cardiology         Follow Up  4 weeks post cath (~ late October)      @Catalina Mohan DNP

## 2022-09-02 ENCOUNTER — OFFICE VISIT (OUTPATIENT)
Dept: CARDIOLOGY | Facility: CLINIC | Age: 57
End: 2022-09-02
Payer: MEDICARE

## 2022-09-02 ENCOUNTER — PATIENT MESSAGE (OUTPATIENT)
Dept: CARDIOLOGY | Facility: CLINIC | Age: 57
End: 2022-09-02

## 2022-09-02 VITALS
DIASTOLIC BLOOD PRESSURE: 76 MMHG | WEIGHT: 273.5 LBS | HEIGHT: 68 IN | OXYGEN SATURATION: 95 % | HEART RATE: 124 BPM | SYSTOLIC BLOOD PRESSURE: 104 MMHG | BODY MASS INDEX: 41.45 KG/M2

## 2022-09-02 DIAGNOSIS — E66.01 MORBID OBESITY WITH BMI OF 40.0-44.9, ADULT: ICD-10-CM

## 2022-09-02 DIAGNOSIS — R06.02 SHORTNESS OF BREATH: Primary | ICD-10-CM

## 2022-09-02 DIAGNOSIS — R07.89 ATYPICAL CHEST PAIN: ICD-10-CM

## 2022-09-02 DIAGNOSIS — R93.1 ABNORMAL ECHOCARDIOGRAM: ICD-10-CM

## 2022-09-02 DIAGNOSIS — I10 HYPERTENSION, UNSPECIFIED TYPE: ICD-10-CM

## 2022-09-02 DIAGNOSIS — E78.00 PURE HYPERCHOLESTEROLEMIA: ICD-10-CM

## 2022-09-02 PROCEDURE — 99999 PR PBB SHADOW E&M-EST. PATIENT-LVL V: CPT | Mod: PBBFAC,,, | Performed by: INTERNAL MEDICINE

## 2022-09-02 PROCEDURE — 99204 PR OFFICE/OUTPT VISIT, NEW, LEVL IV, 45-59 MIN: ICD-10-PCS | Mod: S$GLB,,, | Performed by: INTERNAL MEDICINE

## 2022-09-02 PROCEDURE — 3008F BODY MASS INDEX DOCD: CPT | Mod: CPTII,S$GLB,, | Performed by: INTERNAL MEDICINE

## 2022-09-02 PROCEDURE — 99204 OFFICE O/P NEW MOD 45 MIN: CPT | Mod: S$GLB,,, | Performed by: INTERNAL MEDICINE

## 2022-09-02 PROCEDURE — 3074F PR MOST RECENT SYSTOLIC BLOOD PRESSURE < 130 MM HG: ICD-10-PCS | Mod: CPTII,S$GLB,, | Performed by: INTERNAL MEDICINE

## 2022-09-02 PROCEDURE — 4010F ACE/ARB THERAPY RXD/TAKEN: CPT | Mod: CPTII,S$GLB,, | Performed by: INTERNAL MEDICINE

## 2022-09-02 PROCEDURE — 99999 PR PBB SHADOW E&M-EST. PATIENT-LVL V: ICD-10-PCS | Mod: PBBFAC,,, | Performed by: INTERNAL MEDICINE

## 2022-09-02 PROCEDURE — 3078F DIAST BP <80 MM HG: CPT | Mod: CPTII,S$GLB,, | Performed by: INTERNAL MEDICINE

## 2022-09-02 PROCEDURE — 3008F PR BODY MASS INDEX (BMI) DOCUMENTED: ICD-10-PCS | Mod: CPTII,S$GLB,, | Performed by: INTERNAL MEDICINE

## 2022-09-02 PROCEDURE — 3078F PR MOST RECENT DIASTOLIC BLOOD PRESSURE < 80 MM HG: ICD-10-PCS | Mod: CPTII,S$GLB,, | Performed by: INTERNAL MEDICINE

## 2022-09-02 PROCEDURE — 1159F PR MEDICATION LIST DOCUMENTED IN MEDICAL RECORD: ICD-10-PCS | Mod: CPTII,S$GLB,, | Performed by: INTERNAL MEDICINE

## 2022-09-02 PROCEDURE — 4010F PR ACE/ARB THEARPY RXD/TAKEN: ICD-10-PCS | Mod: CPTII,S$GLB,, | Performed by: INTERNAL MEDICINE

## 2022-09-02 PROCEDURE — 3074F SYST BP LT 130 MM HG: CPT | Mod: CPTII,S$GLB,, | Performed by: INTERNAL MEDICINE

## 2022-09-02 PROCEDURE — 1159F MED LIST DOCD IN RCRD: CPT | Mod: CPTII,S$GLB,, | Performed by: INTERNAL MEDICINE

## 2022-09-02 NOTE — PROGRESS NOTES
Cardiology    9/2/2022  11:53 AM    Problem list  Patient Active Problem List   Diagnosis    DDD (degenerative disc disease), cervical    DDD (degenerative disc disease), lumbar    Chronic lumbar radiculopathy    Post laminectomy syndrome    Lumbar facet arthropathy    Myalgia and myositis    Headache    Glaucoma    Skin cancer    Urinary incontinence    Gynecomastia    Chronic cough    Shortness of breath    Cervical strain    Lumbosacral strain    Chronic pain syndrome    Hypertension    Hyperlipidemia    Constipation    Left lower quadrant abdominal pain    Dizziness    Atypical chest pain    Anxiety    Ventral hernia without obstruction or gangrene    Depression    Hypogonadism male    Chronic renal impairment, stage 1    Right nephrolithiasis    Contact dermatitis    Impacted cerumen of left ear    Bright red blood per rectum    History of lumbar fusion    Spinal stenosis    Bilateral nephrolithiasis    Diverticulosis of colon    Morbid obesity with BMI of 40.0-44.9, adult    Colonic polyp    Diverticulosis    Abnormal echocardiogram       CC:  Abnormal echo    HPI:  Patient was referred for angiogram.  Patient had abnormal echocardiogram which showed LV dysfunction with ejection fraction of 40% with wall motion abnormalities.  He has a very difficult historian.  He states that he has been having atypical chest pain.  He has been disabled since 2010 is not very active.  He denies any paroxysmal nocturnal dyspnea palpitation or syncope.  He was drinking 5 hour energy drinks but quit 1 week ago.    Medications  Current Outpatient Medications   Medication Sig Dispense Refill    alfuzosin (UROXATRAL) 10 mg Tb24 Take 1 tablet (10 mg total) by mouth once daily. 90 tablet 0    atorvastatin (LIPITOR) 20 MG tablet Take 1 tablet (20 mg total) by mouth once daily. 90 tablet 3    cyclobenzaprine (FLEXERIL) 10 MG tablet Take 1 tablet (10 mg total) by mouth nightly as needed for Muscle spasms. 30 tablet 2     enalapriL-hydrochlorothiazide (VASERETIC) 10-25 mg per tablet Take 1 tablet by mouth once daily 90 tablet 0    ergocalciferol (ERGOCALCIFEROL) 50,000 unit Cap Take 1 capsule (50,000 Units total) by mouth every 7 days. 12 capsule 0    fenofibrate (TRICOR) 145 MG tablet Take 1 tablet (145 mg total) by mouth once daily. 90 tablet 3    gabapentin (NEURONTIN) 800 MG tablet Take 1 tablet (800 mg total) by mouth 2 (two) times daily. 60 tablet 5    HYDROcodone-acetaminophen (NORCO)  mg per tablet Take 1 tablet by mouth every 8 (eight) hours as needed.      ibuprofen (ADVIL,MOTRIN) 800 MG tablet Take 1 tablet by mouth every 6 (six) hours as needed.      latanoprost 0.005 % ophthalmic solution Place 1 drop into both eyes every evening.      metoprolol succinate (TOPROL-XL) 25 MG 24 hr tablet Take 1 tablet (25 mg total) by mouth once daily. 30 tablet 11    sertraline (ZOLOFT) 100 MG tablet Take 1 tablet (100 mg total) by mouth once daily. 90 tablet 1    testosterone cypionate (DEPOTESTOTERONE CYPIONATE) 200 mg/mL injection       betamethasone valerate 0.1% (VALISONE) 0.1 % Crea Apply topically 2 (two) times daily. (Patient not taking: No sig reported) 60 g 2    diazePAM (VALIUM) 5 MG tablet Take 1-2 tabs PO 30-60 minutes before MRI (Patient not taking: No sig reported) 5 tablet 0    EScitalopram oxalate (LEXAPRO) 10 MG tablet Take 1 tablet (10 mg total) by mouth once daily. (Patient not taking: No sig reported) 30 tablet 11    oxybutynin (DITROPAN-XL) 10 MG 24 hr tablet Take 1 tablet (10 mg total) by mouth once daily. (Patient not taking: No sig reported) 90 tablet 1     No current facility-administered medications for this visit.      Prior to Admission medications    Medication Sig Start Date End Date Taking? Authorizing Provider   alfuzosin (UROXATRAL) 10 mg Tb24 Take 1 tablet (10 mg total) by mouth once daily. 7/2/22  Yes Jaret Armendariz MD   atorvastatin (LIPITOR) 20 MG tablet Take 1 tablet (20 mg total) by mouth  once daily. 7/2/22  Yes Jaret Armendariz MD   cyclobenzaprine (FLEXERIL) 10 MG tablet Take 1 tablet (10 mg total) by mouth nightly as needed for Muscle spasms. 7/2/22  Yes Jaret Armendariz MD   enalapriL-hydrochlorothiazide (VASERETIC) 10-25 mg per tablet Take 1 tablet by mouth once daily 7/2/22  Yes Jaret Armendariz MD   ergocalciferol (ERGOCALCIFEROL) 50,000 unit Cap Take 1 capsule (50,000 Units total) by mouth every 7 days. 7/2/22  Yes Jaret Armendariz MD   fenofibrate (TRICOR) 145 MG tablet Take 1 tablet (145 mg total) by mouth once daily. 7/2/22  Yes Jaret Armendariz MD   gabapentin (NEURONTIN) 800 MG tablet Take 1 tablet (800 mg total) by mouth 2 (two) times daily. 7/2/22  Yes Jaret Armendariz MD   HYDROcodone-acetaminophen (NORCO)  mg per tablet Take 1 tablet by mouth every 8 (eight) hours as needed. 1/2/20  Yes Historical Provider   ibuprofen (ADVIL,MOTRIN) 800 MG tablet Take 1 tablet by mouth every 6 (six) hours as needed. 1/2/20  Yes Historical Provider   latanoprost 0.005 % ophthalmic solution Place 1 drop into both eyes every evening. 12/4/19  Yes Historical Provider   metoprolol succinate (TOPROL-XL) 25 MG 24 hr tablet Take 1 tablet (25 mg total) by mouth once daily. 8/16/22 8/16/23 Yes Jaret Armendariz MD   sertraline (ZOLOFT) 100 MG tablet Take 1 tablet (100 mg total) by mouth once daily. 7/2/22  Yes Jaret Armendariz MD   testosterone cypionate (DEPOTESTOTERONE CYPIONATE) 200 mg/mL injection  3/2/20  Yes Historical Provider   betamethasone valerate 0.1% (VALISONE) 0.1 % Crea Apply topically 2 (two) times daily.  Patient not taking: No sig reported 3/31/20   Jaret Armendariz MD   diazePAM (VALIUM) 5 MG tablet Take 1-2 tabs PO 30-60 minutes before MRI  Patient not taking: No sig reported 12/11/20   Aleyda Estrada PA-C   EScitalopram oxalate (LEXAPRO) 10 MG tablet Take 1 tablet (10 mg total) by mouth once daily.  Patient not taking: No sig reported 6/28/22 6/28/23  Jaret HANSEN  MD Marcello   oxybutynin (DITROPAN-XL) 10 MG 24 hr tablet Take 1 tablet (10 mg total) by mouth once daily.  Patient not taking: No sig reported 7/2/22   Jaret Armendariz MD         History  Past Medical History:   Diagnosis Date    Depression     Hyperlipemia     Hypertension      Past Surgical History:   Procedure Laterality Date    COLONOSCOPY N/A 1/4/2021    Procedure: COLONOSCOPY;  Surgeon: Eriberto Ladd MD;  Location: [facility];  Service: Endoscopy;  Laterality: N/A;    COLONOSCOPY W/ POLYPECTOMY  01/04/2021    colonoscopy w/ polypectomy per  01/04/2021    HERNIA REPAIR      LAMINECTOMY      TONSILLECTOMY      1974     Social History     Socioeconomic History    Marital status: Legally    Tobacco Use    Smoking status: Never    Smokeless tobacco: Never   Substance and Sexual Activity    Alcohol use: No    Drug use: No         Allergies  Review of patient's allergies indicates:   Allergen Reactions    Fish containing products Other (See Comments)     TUNA FISH  TUNA FISH    Pcn [penicillins]          Review of Systems   Review of Systems   Constitutional: Negative for decreased appetite, fever and weight loss.   HENT:  Negative for congestion and nosebleeds.    Eyes:  Negative for double vision, vision loss in left eye, vision loss in right eye and visual disturbance.   Cardiovascular:  Positive for chest pain. Negative for claudication, cyanosis, dyspnea on exertion, irregular heartbeat, leg swelling, near-syncope, orthopnea, palpitations, paroxysmal nocturnal dyspnea and syncope.   Respiratory:  Negative for cough, hemoptysis, shortness of breath, sleep disturbances due to breathing, snoring, sputum production and wheezing.    Endocrine: Negative for cold intolerance and heat intolerance.   Skin:  Negative for nail changes and rash.   Musculoskeletal:  Negative for joint pain, muscle cramps, muscle weakness and myalgias.   Gastrointestinal:  Negative for change in bowel habit,  heartburn, hematemesis, hematochezia, hemorrhoids and melena.   Neurological:  Negative for dizziness, focal weakness and headaches.       Physical Exam  Wt Readings from Last 1 Encounters:   09/02/22 124.1 kg (273 lb 8 oz)     BP Readings from Last 3 Encounters:   09/02/22 104/76   09/01/22 116/73   08/16/22 120/70     Pulse Readings from Last 1 Encounters:   09/02/22 (!) 124     Body mass index is 41.59 kg/m².    Physical Exam  Constitutional:       Appearance: He is obese.   Cardiovascular:      Rate and Rhythm: Normal rate and regular rhythm.      Pulses:           Carotid pulses are 2+ on the right side and 2+ on the left side.       Radial pulses are 2+ on the right side and 2+ on the left side.      Heart sounds: S1 normal and S2 normal.   Pulmonary:      Breath sounds: Normal breath sounds.   Musculoskeletal:      Right lower leg: No edema.      Left lower leg: No edema.   Neurological:      Mental Status: He is alert.           Assessment  1. Atypical chest pain  Be evaluated  - Ambulatory referral/consult to Interventional Cardiology    2. Shortness of breath  Being evaluated    3. Pure hypercholesterolemia  Unchanged    4. Hypertension, unspecified type  Stable    5. Morbid obesity with BMI of 40.0-44.9, adult  Unchanged    6. Abnormal echocardiogram  Being evaluated        Plan and Discussion  Discussed his coronary artery disease risk factor and abnormal echocardiogram with LV dysfunction with ejection fraction of 40% and wall motion abnormalities.  Discussed risks benefits indication and alternatives of coronary angiogram.  He understood and wished to proceed.  Consent form was signed.  Will schedule angiogram at [facility] via [facility].    Follow Up  Catalina Mohan after angiogram.      Anam Molina MD, F.A.C.C, F.S.C.A.I.

## 2022-09-02 NOTE — PATIENT INSTRUCTIONS
labs on Tuesday at [facility]    Procedure: Coronary Angiogram  Procedure date: September 14, 2022  Procedure time: 8am    Arrive at the Outpatient Surgery Unit at [facility] at 6:30am.  Nothing to eat or drink after midnight.  Take all morning medication with a sip of water.  Please make arrangements for a family member or friend to bring you home after the procedure. You will not be able to drive home.        If you have any questions, please call our office at [phone].

## 2022-09-11 LAB
ALBUMIN SERPL-MCNC: 4.7 G/DL (ref 3.6–5.1)
ALBUMIN/GLOB SERPL: 1.6 (CALC) (ref 1–2.5)
ALP SERPL-CCNC: 54 U/L (ref 35–144)
ALT SERPL-CCNC: 32 U/L (ref 9–46)
AST SERPL-CCNC: 27 U/L (ref 10–35)
BASOPHILS # BLD AUTO: 60 CELLS/UL (ref 0–200)
BASOPHILS NFR BLD AUTO: 0.7 %
BILIRUB SERPL-MCNC: 0.7 MG/DL (ref 0.2–1.2)
BUN SERPL-MCNC: 30 MG/DL (ref 7–25)
BUN/CREAT SERPL: 21 (CALC) (ref 6–22)
CALCIUM SERPL-MCNC: 9.8 MG/DL (ref 8.6–10.3)
CHLORIDE SERPL-SCNC: 96 MMOL/L (ref 98–110)
CHOLEST SERPL-MCNC: 256 MG/DL
CHOLEST/HDLC SERPL: 28.4 (CALC)
CO2 SERPL-SCNC: 27 MMOL/L (ref 20–32)
CREAT SERPL-MCNC: 1.46 MG/DL (ref 0.7–1.3)
EGFR: 56 ML/MIN/1.73M2
EOSINOPHIL # BLD AUTO: 163 CELLS/UL (ref 15–500)
EOSINOPHIL NFR BLD AUTO: 1.9 %
ERYTHROCYTE [DISTWIDTH] IN BLOOD BY AUTOMATED COUNT: 13.5 % (ref 11–15)
ERYTHROCYTE [SEDIMENTATION RATE] IN BLOOD BY WESTERGREN METHOD: 33 MM/H
GLOBULIN SER CALC-MCNC: 3 G/DL (CALC) (ref 1.9–3.7)
GLUCOSE SERPL-MCNC: 103 MG/DL (ref 65–99)
HCT VFR BLD AUTO: 40.6 % (ref 38.5–50)
HDLC SERPL-MCNC: 9 MG/DL
HGB BLD-MCNC: 12.9 G/DL (ref 13.2–17.1)
LDLC SERPL CALC-MCNC: ABNORMAL MG/DL (CALC)
LYMPHOCYTES # BLD AUTO: 2047 CELLS/UL (ref 850–3900)
LYMPHOCYTES NFR BLD AUTO: 23.8 %
MCH RBC QN AUTO: 28.8 PG (ref 27–33)
MCHC RBC AUTO-ENTMCNC: 31.8 G/DL (ref 32–36)
MCV RBC AUTO: 90.6 FL (ref 80–100)
MONOCYTES # BLD AUTO: 576 CELLS/UL (ref 200–950)
MONOCYTES NFR BLD AUTO: 6.7 %
NEUTROPHILS # BLD AUTO: 5753 CELLS/UL (ref 1500–7800)
NEUTROPHILS NFR BLD AUTO: 66.9 %
NONHDLC SERPL-MCNC: 247 MG/DL (CALC)
PLATELET # BLD AUTO: 256 THOUSAND/UL (ref 140–400)
PMV BLD REES-ECKER: 11.9 FL (ref 7.5–12.5)
POTASSIUM SERPL-SCNC: 3.8 MMOL/L (ref 3.5–5.3)
PROT SERPL-MCNC: 7.7 G/DL (ref 6.1–8.1)
RBC # BLD AUTO: 4.48 MILLION/UL (ref 4.2–5.8)
SODIUM SERPL-SCNC: 135 MMOL/L (ref 135–146)
TESTOST FREE SERPL-MCNC: 38.3 PG/ML (ref 46–224)
TRIGL SERPL-MCNC: 492 MG/DL
TSH SERPL-ACNC: 0.58 MIU/L (ref 0.4–4.5)
WBC # BLD AUTO: 8.6 THOUSAND/UL (ref 3.8–10.8)

## 2022-09-12 ENCOUNTER — PATIENT MESSAGE (OUTPATIENT)
Dept: CARDIOLOGY | Facility: CLINIC | Age: 57
End: 2022-09-12
Payer: MEDICARE

## 2022-09-26 ENCOUNTER — TELEPHONE (OUTPATIENT)
Dept: CARDIOLOGY | Facility: CLINIC | Age: 57
End: 2022-09-26
Payer: MEDICARE

## 2022-09-26 NOTE — TELEPHONE ENCOUNTER
Gave patient appt for 10/3/2022 and labs one hour before the appt.    ----- Message from Catalina Mohan DNP sent at 9/26/2022  8:10 AM CDT -----  This pt will need a visit in 1 week and a CMP same day.    Thanks!    catalina

## 2022-09-27 ENCOUNTER — PATIENT MESSAGE (OUTPATIENT)
Dept: PRIMARY CARE CLINIC | Facility: CLINIC | Age: 57
End: 2022-09-27
Payer: MEDICARE

## 2022-09-28 ENCOUNTER — TELEPHONE (OUTPATIENT)
Dept: PRIMARY CARE CLINIC | Facility: CLINIC | Age: 57
End: 2022-09-28
Payer: MEDICARE

## 2022-09-28 NOTE — TELEPHONE ENCOUNTER
----- Message from Jania Stoll sent at 9/28/2022 12:50 PM CDT -----  Contact: [phone] Patient  Pt would like a call back regarding an appt . Pt would like to cancel his appt today and reschedule for tomorrow or Friday please because this is a hospital f/u.  Please call pt and advise.

## 2022-09-29 ENCOUNTER — OFFICE VISIT (OUTPATIENT)
Dept: PRIMARY CARE CLINIC | Facility: CLINIC | Age: 57
End: 2022-09-29
Payer: MEDICARE

## 2022-09-29 ENCOUNTER — PATIENT MESSAGE (OUTPATIENT)
Dept: CARDIOLOGY | Facility: CLINIC | Age: 57
End: 2022-09-29
Payer: MEDICARE

## 2022-09-29 VITALS
BODY MASS INDEX: 39.79 KG/M2 | OXYGEN SATURATION: 97 % | RESPIRATION RATE: 18 BRPM | DIASTOLIC BLOOD PRESSURE: 82 MMHG | HEIGHT: 69 IN | SYSTOLIC BLOOD PRESSURE: 128 MMHG | WEIGHT: 268.63 LBS | HEART RATE: 110 BPM

## 2022-09-29 DIAGNOSIS — M96.1 POST LAMINECTOMY SYNDROME: ICD-10-CM

## 2022-09-29 DIAGNOSIS — E78.5 HYPERLIPIDEMIA, UNSPECIFIED HYPERLIPIDEMIA TYPE: Primary | ICD-10-CM

## 2022-09-29 DIAGNOSIS — I25.10 CORONARY ARTERY DISEASE INVOLVING NATIVE CORONARY ARTERY OF NATIVE HEART, UNSPECIFIED WHETHER ANGINA PRESENT: ICD-10-CM

## 2022-09-29 PROCEDURE — 99213 PR OFFICE/OUTPT VISIT, EST, LEVL III, 20-29 MIN: ICD-10-PCS | Mod: S$GLB,,, | Performed by: INTERNAL MEDICINE

## 2022-09-29 PROCEDURE — 99999 PR PBB SHADOW E&M-EST. PATIENT-LVL IV: ICD-10-PCS | Mod: PBBFAC,,, | Performed by: INTERNAL MEDICINE

## 2022-09-29 PROCEDURE — 3079F PR MOST RECENT DIASTOLIC BLOOD PRESSURE 80-89 MM HG: ICD-10-PCS | Mod: CPTII,S$GLB,, | Performed by: INTERNAL MEDICINE

## 2022-09-29 PROCEDURE — 1159F MED LIST DOCD IN RCRD: CPT | Mod: CPTII,S$GLB,, | Performed by: INTERNAL MEDICINE

## 2022-09-29 PROCEDURE — 3008F PR BODY MASS INDEX (BMI) DOCUMENTED: ICD-10-PCS | Mod: CPTII,S$GLB,, | Performed by: INTERNAL MEDICINE

## 2022-09-29 PROCEDURE — 3008F BODY MASS INDEX DOCD: CPT | Mod: CPTII,S$GLB,, | Performed by: INTERNAL MEDICINE

## 2022-09-29 PROCEDURE — 99213 OFFICE O/P EST LOW 20 MIN: CPT | Mod: S$GLB,,, | Performed by: INTERNAL MEDICINE

## 2022-09-29 PROCEDURE — 4010F PR ACE/ARB THEARPY RXD/TAKEN: ICD-10-PCS | Mod: CPTII,S$GLB,, | Performed by: INTERNAL MEDICINE

## 2022-09-29 PROCEDURE — 3074F SYST BP LT 130 MM HG: CPT | Mod: CPTII,S$GLB,, | Performed by: INTERNAL MEDICINE

## 2022-09-29 PROCEDURE — 1160F PR REVIEW ALL MEDS BY PRESCRIBER/CLIN PHARMACIST DOCUMENTED: ICD-10-PCS | Mod: CPTII,S$GLB,, | Performed by: INTERNAL MEDICINE

## 2022-09-29 PROCEDURE — 1160F RVW MEDS BY RX/DR IN RCRD: CPT | Mod: CPTII,S$GLB,, | Performed by: INTERNAL MEDICINE

## 2022-09-29 PROCEDURE — 3074F PR MOST RECENT SYSTOLIC BLOOD PRESSURE < 130 MM HG: ICD-10-PCS | Mod: CPTII,S$GLB,, | Performed by: INTERNAL MEDICINE

## 2022-09-29 PROCEDURE — 3079F DIAST BP 80-89 MM HG: CPT | Mod: CPTII,S$GLB,, | Performed by: INTERNAL MEDICINE

## 2022-09-29 PROCEDURE — 1159F PR MEDICATION LIST DOCUMENTED IN MEDICAL RECORD: ICD-10-PCS | Mod: CPTII,S$GLB,, | Performed by: INTERNAL MEDICINE

## 2022-09-29 PROCEDURE — 4010F ACE/ARB THERAPY RXD/TAKEN: CPT | Mod: CPTII,S$GLB,, | Performed by: INTERNAL MEDICINE

## 2022-09-29 PROCEDURE — 99999 PR PBB SHADOW E&M-EST. PATIENT-LVL IV: CPT | Mod: PBBFAC,,, | Performed by: INTERNAL MEDICINE

## 2022-09-29 RX ORDER — OMEGA-3-ACID ETHYL ESTERS 1 G/1
2 CAPSULE, LIQUID FILLED ORAL 2 TIMES DAILY
Qty: 360 CAPSULE | Refills: 3 | Status: SHIPPED | OUTPATIENT
Start: 2022-09-29 | End: 2023-11-07

## 2022-09-29 RX ORDER — ATORVASTATIN CALCIUM 40 MG/1
40 TABLET, FILM COATED ORAL DAILY
Qty: 90 TABLET | Refills: 3 | Status: SHIPPED | OUTPATIENT
Start: 2022-09-29 | End: 2023-10-16

## 2022-09-30 ENCOUNTER — TELEPHONE (OUTPATIENT)
Dept: PRIMARY CARE CLINIC | Facility: CLINIC | Age: 57
End: 2022-09-30
Payer: MEDICARE

## 2022-09-30 DIAGNOSIS — M50.30 DDD (DEGENERATIVE DISC DISEASE), CERVICAL: ICD-10-CM

## 2022-09-30 DIAGNOSIS — N40.0 BENIGN PROSTATIC HYPERPLASIA, UNSPECIFIED WHETHER LOWER URINARY TRACT SYMPTOMS PRESENT: ICD-10-CM

## 2022-09-30 DIAGNOSIS — M47.816 LUMBAR FACET ARTHROPATHY: Primary | ICD-10-CM

## 2022-09-30 NOTE — TELEPHONE ENCOUNTER
Patient had back surgery in 2009 while in [place]. He had rods placed and had a tumor removed. He mentioned they told him to do a CT scan yearly, so he's requesting if we can order this prior to his visit in November, or if you think an MRI is better to look for a tumor, he can try that one. He just wanted to check and ensure everything is ok

## 2022-09-30 NOTE — TELEPHONE ENCOUNTER
----- Message from Kerry Dixon MA sent at 9/30/2022  1:05 PM CDT -----  Contact: pt [phone]    ----- Message -----  From: Mayte Rosenberg  Sent: 9/30/2022  11:25 AM CDT  To: [facility] Staff    Pt is returning a call he missed from the office. Per pt, please call.          Thank you

## 2022-09-30 NOTE — PROGRESS NOTES
Subjective:       Patient ID: Collin Almaguer Sr. is a 57 y.o. male.    Chief Complaint: Follow-up (3 month), Back Pain (Occasional back pain and leg pain - has rods in the back and previously had back surgery ), and Dizziness (Gets dizziness sometimes and chest pain )    HPI  Pt visit today for f/u . Pt s/p cardiac cath few weeks ago with 100% lesion left LAD no intervention no other stenoses he is doing well post cardiac cath no complication no sob cp he has chronic lower back pain since 1995 and has been disabled since 2010 he had LS surgery in [place] to remove benign tumor? He ad Ctscan LS last yr no tumor just degenerative ds and post op chnages He denies any other symptoms  Review of Systems    Objective:      Physical Exam  Vitals and nursing note reviewed.   Constitutional:       General: He is not in acute distress.     Appearance: He is well-developed.   HENT:      Head: Normocephalic and atraumatic.      Right Ear: External ear normal.      Left Ear: External ear normal.      Nose: Nose normal.      Mouth/Throat:      Pharynx: No oropharyngeal exudate.   Eyes:      Extraocular Movements: Extraocular movements intact.      Conjunctiva/sclera: Conjunctivae normal.      Pupils: Pupils are equal, round, and reactive to light.   Neck:      Thyroid: No thyromegaly.   Cardiovascular:      Rate and Rhythm: Normal rate and regular rhythm.      Heart sounds: Normal heart sounds. No murmur heard.    No friction rub. No gallop.   Pulmonary:      Effort: Pulmonary effort is normal. No respiratory distress.      Breath sounds: Normal breath sounds. No wheezing.   Abdominal:      General: Bowel sounds are normal. There is no distension.      Palpations: Abdomen is soft.      Tenderness: There is no abdominal tenderness.   Musculoskeletal:         General: No tenderness or deformity. Normal range of motion.      Cervical back: Normal range of motion and neck supple.   Lymphadenopathy:      Cervical: No cervical  adenopathy.   Skin:     General: Skin is warm and dry.      Findings: No erythema or rash.   Neurological:      Mental Status: He is alert and oriented to person, place, and time.   Psychiatric:         Thought Content: Thought content normal.         Judgment: Judgment normal.       Assessment:       1. Hyperlipidemia, unspecified hyperlipidemia type    2. Coronary artery disease involving native coronary artery of native heart, unspecified whether angina present    3. Post laminectomy syndrome          Plan:       Hyperlipidemia, unspecified hyperlipidemia type  -     atorvastatin (LIPITOR) 40 MG tablet; Take 1 tablet (40 mg total) by mouth once daily.  Dispense: 90 tablet; Refill: 3  -     omega-3 acid ethyl esters (LOVAZA) 1 gram capsule; Take 2 capsules (2 g total) by mouth 2 (two) times daily.  Dispense: 360 capsule; Refill: 3    Coronary artery disease involving native coronary artery of native heart, unspecified whether angina present  Comments:  continue with risk modification and add asa 1 mg daily  Orders:  -     atorvastatin (LIPITOR) 40 MG tablet; Take 1 tablet (40 mg total) by mouth once daily.  Dispense: 90 tablet; Refill: 3  -     omega-3 acid ethyl esters (LOVAZA) 1 gram capsule; Take 2 capsules (2 g total) by mouth 2 (two) times daily.  Dispense: 360 capsule; Refill: 3    Post laminectomy syndrome      Medication List with Changes/Refills   New Medications    OMEGA-3 ACID ETHYL ESTERS (LOVAZA) 1 GRAM CAPSULE    Take 2 capsules (2 g total) by mouth 2 (two) times daily.   Current Medications    ALFUZOSIN (UROXATRAL) 10 MG TB24    Take 1 tablet (10 mg total) by mouth once daily.    CYCLOBENZAPRINE (FLEXERIL) 10 MG TABLET    Take 1 tablet (10 mg total) by mouth nightly as needed for Muscle spasms.    ENALAPRIL (VASOTEC) 10 MG TABLET    Take 1 tablet (10 mg total) by mouth once daily.    ERGOCALCIFEROL (ERGOCALCIFEROL) 50,000 UNIT CAP    Take 1 capsule (50,000 Units total) by mouth every 7 days.     FENOFIBRATE (TRICOR) 145 MG TABLET    Take 1 tablet (145 mg total) by mouth once daily.    GABAPENTIN (NEURONTIN) 800 MG TABLET    Take 1 tablet (800 mg total) by mouth 2 (two) times daily.    HYDROCODONE-ACETAMINOPHEN (NORCO)  MG PER TABLET    Take 1 tablet by mouth every 6 (six) hours as needed.    LATANOPROST 0.005 % OPHTHALMIC SOLUTION    Place 1 drop into both eyes every evening.    METOPROLOL SUCCINATE (TOPROL-XL) 25 MG 24 HR TABLET    Take 1 tablet (25 mg total) by mouth once daily.    SERTRALINE (ZOLOFT) 100 MG TABLET    Take 1 tablet (100 mg total) by mouth once daily.   Changed and/or Refilled Medications    Modified Medication Previous Medication    ATORVASTATIN (LIPITOR) 40 MG TABLET atorvastatin (LIPITOR) 20 MG tablet       Take 1 tablet (40 mg total) by mouth once daily.    Take 2 tablets (40 mg total) by mouth once daily.

## 2022-09-30 NOTE — TELEPHONE ENCOUNTER
----- Message from Kerry Dixon MA sent at 9/30/2022  8:47 AM CDT -----  Contact: [phone] Patient    ----- Message -----  From: Jania Stoll  Sent: 9/30/2022   8:31 AM CDT  To: [facility] Staff    Pt stated he saw Dr Montez yesterday and in the After visit summary and notes there are some things wrong. The pt stated he did not have a CT scan a year ago. Pt stated he said he is supposed to have a CT scan once a year to make sure the cancer is not growing back but Dr Montez put the pt had one and nothing was found. That is not true. The pt stated that Dr Montez stated he had his angiogram done weeks ago and the pt had the angiogram done 4 days ago. Can this please be fixed. Pt stated that some things are not true. Pt does not want that to affect his care in the future. Please call and advise. Thank you.

## 2022-09-30 NOTE — TELEPHONE ENCOUNTER
I returned the patient's call. I let him know that he did the CT last April in 2021 and he was looking at the order that was placed on September 2nd. He's asking too if we are able to order a CT as he was previously told that he would need a CT every year to check and make sure the tumor doesn't come back on the back. I let him know I will send a message top the provider to see if he wants him to do it prior to seeing him in November.

## 2022-10-03 ENCOUNTER — OFFICE VISIT (OUTPATIENT)
Dept: CARDIOLOGY | Facility: CLINIC | Age: 57
End: 2022-10-03
Payer: MEDICARE

## 2022-10-03 VITALS
HEART RATE: 103 BPM | WEIGHT: 266 LBS | BODY MASS INDEX: 39.4 KG/M2 | OXYGEN SATURATION: 96 % | DIASTOLIC BLOOD PRESSURE: 65 MMHG | SYSTOLIC BLOOD PRESSURE: 102 MMHG | HEIGHT: 69 IN

## 2022-10-03 DIAGNOSIS — R79.89 ELEVATED SERUM CREATININE: Primary | ICD-10-CM

## 2022-10-03 DIAGNOSIS — R07.89 ATYPICAL CHEST PAIN: ICD-10-CM

## 2022-10-03 DIAGNOSIS — G47.33 OSA (OBSTRUCTIVE SLEEP APNEA): ICD-10-CM

## 2022-10-03 DIAGNOSIS — R93.1 ABNORMAL ECHOCARDIOGRAM: ICD-10-CM

## 2022-10-03 DIAGNOSIS — E78.5 HYPERLIPIDEMIA, UNSPECIFIED HYPERLIPIDEMIA TYPE: ICD-10-CM

## 2022-10-03 PROCEDURE — 3008F BODY MASS INDEX DOCD: CPT | Mod: CPTII,S$GLB,, | Performed by: NURSE PRACTITIONER

## 2022-10-03 PROCEDURE — 1160F RVW MEDS BY RX/DR IN RCRD: CPT | Mod: CPTII,S$GLB,, | Performed by: NURSE PRACTITIONER

## 2022-10-03 PROCEDURE — 99999 PR PBB SHADOW E&M-EST. PATIENT-LVL V: CPT | Mod: PBBFAC,,, | Performed by: NURSE PRACTITIONER

## 2022-10-03 PROCEDURE — 99213 OFFICE O/P EST LOW 20 MIN: CPT | Mod: S$GLB,,, | Performed by: NURSE PRACTITIONER

## 2022-10-03 PROCEDURE — 4010F PR ACE/ARB THEARPY RXD/TAKEN: ICD-10-PCS | Mod: CPTII,S$GLB,, | Performed by: NURSE PRACTITIONER

## 2022-10-03 PROCEDURE — 1159F MED LIST DOCD IN RCRD: CPT | Mod: CPTII,S$GLB,, | Performed by: NURSE PRACTITIONER

## 2022-10-03 PROCEDURE — 3074F PR MOST RECENT SYSTOLIC BLOOD PRESSURE < 130 MM HG: ICD-10-PCS | Mod: CPTII,S$GLB,, | Performed by: NURSE PRACTITIONER

## 2022-10-03 PROCEDURE — 4010F ACE/ARB THERAPY RXD/TAKEN: CPT | Mod: CPTII,S$GLB,, | Performed by: NURSE PRACTITIONER

## 2022-10-03 PROCEDURE — 1159F PR MEDICATION LIST DOCUMENTED IN MEDICAL RECORD: ICD-10-PCS | Mod: CPTII,S$GLB,, | Performed by: NURSE PRACTITIONER

## 2022-10-03 PROCEDURE — 3008F PR BODY MASS INDEX (BMI) DOCUMENTED: ICD-10-PCS | Mod: CPTII,S$GLB,, | Performed by: NURSE PRACTITIONER

## 2022-10-03 PROCEDURE — 3078F PR MOST RECENT DIASTOLIC BLOOD PRESSURE < 80 MM HG: ICD-10-PCS | Mod: CPTII,S$GLB,, | Performed by: NURSE PRACTITIONER

## 2022-10-03 PROCEDURE — 3074F SYST BP LT 130 MM HG: CPT | Mod: CPTII,S$GLB,, | Performed by: NURSE PRACTITIONER

## 2022-10-03 PROCEDURE — 99999 PR PBB SHADOW E&M-EST. PATIENT-LVL V: ICD-10-PCS | Mod: PBBFAC,,, | Performed by: NURSE PRACTITIONER

## 2022-10-03 PROCEDURE — 99213 PR OFFICE/OUTPT VISIT, EST, LEVL III, 20-29 MIN: ICD-10-PCS | Mod: S$GLB,,, | Performed by: NURSE PRACTITIONER

## 2022-10-03 PROCEDURE — 1160F PR REVIEW ALL MEDS BY PRESCRIBER/CLIN PHARMACIST DOCUMENTED: ICD-10-PCS | Mod: CPTII,S$GLB,, | Performed by: NURSE PRACTITIONER

## 2022-10-03 PROCEDURE — 3078F DIAST BP <80 MM HG: CPT | Mod: CPTII,S$GLB,, | Performed by: NURSE PRACTITIONER

## 2022-10-03 NOTE — PATIENT INSTRUCTIONS
Continue taking your metoprolol 25 mg     I want you to split your Enalapril in half and take 5 mg daily- we will bring you back in 1 week to check your blood pressure    Start walking a little bit everyday, your goal is 30 minutes daily    Continue to avoid ibuprofen, motrin, and naproxen    Limiting sodium and fat in your diet can be helpful    Change positions a little bit slowly

## 2022-10-03 NOTE — PROGRESS NOTES
Cardiology    10/3/2022  9:19 AM    Problem list  Patient Active Problem List   Diagnosis    DDD (degenerative disc disease), cervical    DDD (degenerative disc disease), lumbar    Chronic lumbar radiculopathy    Post laminectomy syndrome    Lumbar facet arthropathy    Myalgia and myositis    Headache    Glaucoma    Skin cancer    Urinary incontinence    Gynecomastia    Chronic cough    Shortness of breath    Cervical strain    Lumbosacral strain    Chronic pain syndrome    Hypertension    Hyperlipidemia    Constipation    Left lower quadrant abdominal pain    Dizziness    Atypical chest pain    Anxiety    Ventral hernia without obstruction or gangrene    Depression    Hypogonadism male    Chronic renal impairment, stage 1    Right nephrolithiasis    Contact dermatitis    Impacted cerumen of left ear    Bright red blood per rectum    History of lumbar fusion    Spinal stenosis    Bilateral nephrolithiasis    Diverticulosis of colon    Morbid obesity with BMI of 40.0-44.9, adult    Colonic polyp    Diverticulosis    Abnormal echocardiogram       CC:  Post cath eval    HPI:  Having some problems with finding the right foods to eat.  He was walking while eating a turkey sandwich and he felt like blood was rushing to his head.  He had been sitting and resting for a while prior.  He had a near- syncopal episode.  Had to crawl back to the front room.  This happened a couple of weeks ago. He had a elevation in his Cr, a repeat BMP was pending.  Has terrible problems sleeping and missed an appointment. Taking metoprolol and Dr. Montez added in omega threes, [facility] did not have them yet.  Had a cath with Dr. Molina- 100% stenosis of mid LAD lesion with collateral circulation noted.     Medications  Current Outpatient Medications   Medication Sig Dispense Refill    alfuzosin (UROXATRAL) 10 mg Tb24 Take 1 tablet (10 mg total) by mouth once daily. 90 tablet 0    atorvastatin (LIPITOR) 40 MG tablet Take 1 tablet (40 mg  total) by mouth once daily. 90 tablet 3    cyclobenzaprine (FLEXERIL) 10 MG tablet Take 1 tablet (10 mg total) by mouth nightly as needed for Muscle spasms. 30 tablet 2    enalapril (VASOTEC) 10 MG tablet Take 1 tablet (10 mg total) by mouth once daily. 90 tablet 3    ergocalciferol (ERGOCALCIFEROL) 50,000 unit Cap Take 1 capsule (50,000 Units total) by mouth every 7 days. 12 capsule 0    fenofibrate (TRICOR) 145 MG tablet Take 1 tablet (145 mg total) by mouth once daily. 90 tablet 3    gabapentin (NEURONTIN) 800 MG tablet Take 1 tablet (800 mg total) by mouth 2 (two) times daily. 60 tablet 5    HYDROcodone-acetaminophen (NORCO)  mg per tablet Take 1 tablet by mouth every 6 (six) hours as needed.      latanoprost 0.005 % ophthalmic solution Place 1 drop into both eyes every evening.      metoprolol succinate (TOPROL-XL) 25 MG 24 hr tablet Take 1 tablet (25 mg total) by mouth once daily. 30 tablet 11    sertraline (ZOLOFT) 100 MG tablet Take 1 tablet (100 mg total) by mouth once daily. 90 tablet 1    omega-3 acid ethyl esters (LOVAZA) 1 gram capsule Take 2 capsules (2 g total) by mouth 2 (two) times daily. (Patient not taking: Reported on 10/3/2022) 360 capsule 3     No current facility-administered medications for this visit.      Prior to Admission medications    Medication Sig Start Date End Date Taking? Authorizing Provider   alfuzosin (UROXATRAL) 10 mg Tb24 Take 1 tablet (10 mg total) by mouth once daily. 7/2/22  Yes Jaret Armendariz MD   atorvastatin (LIPITOR) 40 MG tablet Take 1 tablet (40 mg total) by mouth once daily. 9/29/22  Yes Alexander Montez MD   cyclobenzaprine (FLEXERIL) 10 MG tablet Take 1 tablet (10 mg total) by mouth nightly as needed for Muscle spasms. 7/2/22  Yes Jaret Armendariz MD   enalapril (VASOTEC) 10 MG tablet Take 1 tablet (10 mg total) by mouth once daily. 9/26/22 9/26/23 Yes Anam Molina MD   ergocalciferol (ERGOCALCIFEROL) 50,000 unit Cap Take 1 capsule (50,000 Units  total) by mouth every 7 days. 7/2/22  Yes Jaret Armendariz MD   fenofibrate (TRICOR) 145 MG tablet Take 1 tablet (145 mg total) by mouth once daily. 7/2/22  Yes Jaret Armendariz MD   gabapentin (NEURONTIN) 800 MG tablet Take 1 tablet (800 mg total) by mouth 2 (two) times daily. 7/2/22  Yes Jaret Armendariz MD   HYDROcodone-acetaminophen (NORCO)  mg per tablet Take 1 tablet by mouth every 6 (six) hours as needed. 1/2/20  Yes Historical Provider   latanoprost 0.005 % ophthalmic solution Place 1 drop into both eyes every evening. 12/4/19  Yes Historical Provider   metoprolol succinate (TOPROL-XL) 25 MG 24 hr tablet Take 1 tablet (25 mg total) by mouth once daily. 8/16/22 8/16/23 Yes Jaret Armendariz MD   sertraline (ZOLOFT) 100 MG tablet Take 1 tablet (100 mg total) by mouth once daily. 7/2/22  Yes Jaret Armendariz MD   omega-3 acid ethyl esters (LOVAZA) 1 gram capsule Take 2 capsules (2 g total) by mouth 2 (two) times daily.  Patient not taking: Reported on 10/3/2022 9/29/22 9/29/23  Alexander Montez MD         History  Past Medical History:   Diagnosis Date    Cancer     Had a tumor removed in the back    Depression     Hyperlipemia     Hypertension     Low testosterone     Vitamin D deficiency      Past Surgical History:   Procedure Laterality Date    BACK SURGERY      November 2008 in [place]    BASAL CELL CARCINOMA EXCISION      left side of lip/cheek    COLONOSCOPY N/A 01/04/2021    Procedure: COLONOSCOPY;  Surgeon: Eriberto Ladd MD;  Location: [facility] ENDO;  Service: Endoscopy;  Laterality: N/A;    COLONOSCOPY W/ POLYPECTOMY  01/04/2021    colonoscopy w/ polypectomy per  01/04/2021    CORONARY ANGIOGRAPHY  09/26/2022    CORONARY ANGIOGRAPHY Right 09/26/2022    Procedure: ANGIOGRAM, CORONARY ARTERY;  Surgeon: Anam Molina MD;  Location: [facility] CATH LAB;  Service: Cardiology;  Laterality: Right;    HERNIA REPAIR      LAMINECTOMY      PROSTATE BIOPSY      TONSILLECTOMY      1974      Social History     Socioeconomic History    Marital status: Legally    Tobacco Use    Smoking status: Never    Smokeless tobacco: Never   Substance and Sexual Activity    Alcohol use: No    Drug use: No         Allergies  Review of patient's allergies indicates:   Allergen Reactions    Fish containing products Hives     TUNA FISH      Pcn [penicillins] Itching         Review of Systems   Review of Systems   Constitutional: Negative for diaphoresis and malaise/fatigue.   HENT: Negative.     Cardiovascular:  Positive for chest pain. Negative for claudication, dyspnea on exertion, irregular heartbeat, leg swelling, near-syncope, orthopnea, palpitations, paroxysmal nocturnal dyspnea and syncope.   Respiratory:  Negative for shortness of breath.    Endocrine: Negative for polydipsia, polyphagia and polyuria.   Hematologic/Lymphatic: Does not bruise/bleed easily.   Gastrointestinal:  Negative for bloating, nausea and vomiting.   Genitourinary: Negative.    Neurological:  Positive for dizziness and light-headedness. Negative for excessive daytime sleepiness, loss of balance and weakness.   Psychiatric/Behavioral:  The patient is not nervous/anxious.    Allergic/Immunologic: Negative.        Physical Exam  Wt Readings from Last 1 Encounters:   10/03/22 120.7 kg (265 lb 15.8 oz)     BP Readings from Last 3 Encounters:   10/03/22 102/65   09/29/22 128/82   09/26/22 129/74     Pulse Readings from Last 1 Encounters:   10/03/22 103     Body mass index is 39.28 kg/m².    Physical Exam  Vitals and nursing note reviewed.   Constitutional:       Appearance: Normal appearance.      Comments: BMI 39   HENT:      Head: Normocephalic and atraumatic.      Mouth/Throat:      Mouth: Mucous membranes are moist.   Eyes:      Pupils: Pupils are equal, round, and reactive to light.   Cardiovascular:      Rate and Rhythm: Normal rate and regular rhythm.      Pulses:           Radial pulses are 2+ on the right side and 2+ on the  left side.        Dorsalis pedis pulses are 2+ on the right side and 2+ on the left side.        Posterior tibial pulses are 2+ on the right side and 2+ on the left side.      Heart sounds: No murmur heard.  Pulmonary:      Effort: Pulmonary effort is normal. No respiratory distress.      Breath sounds: Normal breath sounds.   Abdominal:      General: There is no distension.      Tenderness: There is no abdominal tenderness.   Musculoskeletal:      Cervical back: Normal range of motion.      Right lower leg: No edema.      Left lower leg: No edema.   Skin:     General: Skin is warm and dry.      Findings: No erythema.   Neurological:      General: No focal deficit present.      Mental Status: He is alert.   Psychiatric:         Mood and Affect: Mood normal.         Behavior: Behavior normal.       Problem List Items Addressed This Visit          Cardiac/Vascular    Hyperlipidemia    Overview     Continue statin         Current Assessment & Plan     As above         Abnormal echocardiogram    Overview     Depressed EF 40%  Significant wall motion abnormalities           Current Assessment & Plan     As above            Renal/    Elevated serum creatinine - Primary    Overview     Elevated at 1.7 prior to angiogram, now 2.0.  Will hold enalapril entirely and recheck chemistry in a 1 week or so.          Current Assessment & Plan     As above         Relevant Orders    COMPREHENSIVE METABOLIC PANEL       Other    Atypical chest pain    Overview     LAD lesion noted with collateral circulation  Needs aggressive risk factor modification           Current Assessment & Plan     As above          Other Visit Diagnoses       KHRIS (obstructive sleep apnea)        Relevant Orders    Ambulatory referral/consult to Sleep Disorders                      Follow Up  BP check 1 week      @Catalina Mohan DNP

## 2022-10-12 ENCOUNTER — CLINICAL SUPPORT (OUTPATIENT)
Dept: CARDIOLOGY | Facility: CLINIC | Age: 57
End: 2022-10-12
Payer: MEDICARE

## 2022-10-12 VITALS — HEART RATE: 98 BPM | DIASTOLIC BLOOD PRESSURE: 77 MMHG | SYSTOLIC BLOOD PRESSURE: 122 MMHG | OXYGEN SATURATION: 95 %

## 2022-10-12 PROCEDURE — 99999 PR PBB SHADOW E&M-EST. PATIENT-LVL I: ICD-10-PCS | Mod: PBBFAC,,,

## 2022-10-12 PROCEDURE — 99999 PR PBB SHADOW E&M-EST. PATIENT-LVL I: CPT | Mod: PBBFAC,,,

## 2022-11-14 ENCOUNTER — TELEPHONE (OUTPATIENT)
Dept: PRIMARY CARE CLINIC | Facility: CLINIC | Age: 57
End: 2022-11-14
Payer: MEDICARE

## 2022-11-14 NOTE — TELEPHONE ENCOUNTER
----- Message from Jania Stoll sent at 11/14/2022  9:53 AM CST -----  Contact: [phone] Patient  Pt is have to have an MRI and needs medication for calming. Pt is claustrophobic. Please call and advise.     [facility]  [place] - [address]  [address] [address]  Phone: [phone] Fax: [phone]

## 2022-11-17 ENCOUNTER — PATIENT MESSAGE (OUTPATIENT)
Dept: PRIMARY CARE CLINIC | Facility: CLINIC | Age: 57
End: 2022-11-17
Payer: MEDICARE

## 2022-11-18 DIAGNOSIS — M51.36 DDD (DEGENERATIVE DISC DISEASE), LUMBAR: Primary | ICD-10-CM

## 2022-11-18 RX ORDER — DIAZEPAM 5 MG/1
5 TABLET ORAL EVERY 6 HOURS PRN
Qty: 2 TABLET | Refills: 0 | Status: SHIPPED | OUTPATIENT
Start: 2022-11-18 | End: 2023-04-26

## 2022-11-21 ENCOUNTER — PATIENT MESSAGE (OUTPATIENT)
Dept: NEUROSURGERY | Facility: CLINIC | Age: 57
End: 2022-11-21
Payer: MEDICARE

## 2022-11-28 ENCOUNTER — PATIENT MESSAGE (OUTPATIENT)
Dept: CARDIOLOGY | Facility: CLINIC | Age: 57
End: 2022-11-28
Payer: MEDICARE

## 2022-11-28 ENCOUNTER — OFFICE VISIT (OUTPATIENT)
Dept: PRIMARY CARE CLINIC | Facility: CLINIC | Age: 57
End: 2022-11-28
Payer: MEDICARE

## 2022-11-28 ENCOUNTER — TELEPHONE (OUTPATIENT)
Dept: PRIMARY CARE CLINIC | Facility: CLINIC | Age: 57
End: 2022-11-28
Payer: MEDICARE

## 2022-11-28 VITALS
WEIGHT: 258.25 LBS | HEART RATE: 96 BPM | HEIGHT: 69 IN | OXYGEN SATURATION: 98 % | RESPIRATION RATE: 18 BRPM | SYSTOLIC BLOOD PRESSURE: 122 MMHG | DIASTOLIC BLOOD PRESSURE: 86 MMHG | BODY MASS INDEX: 38.25 KG/M2

## 2022-11-28 DIAGNOSIS — Z98.1 HISTORY OF LUMBAR FUSION: ICD-10-CM

## 2022-11-28 DIAGNOSIS — R79.89 LOW TESTOSTERONE: Primary | ICD-10-CM

## 2022-11-28 DIAGNOSIS — I10 HYPERTENSION, UNSPECIFIED TYPE: ICD-10-CM

## 2022-11-28 DIAGNOSIS — M50.30 DDD (DEGENERATIVE DISC DISEASE), CERVICAL: ICD-10-CM

## 2022-11-28 DIAGNOSIS — E78.00 PURE HYPERCHOLESTEROLEMIA: ICD-10-CM

## 2022-11-28 DIAGNOSIS — M51.36 DDD (DEGENERATIVE DISC DISEASE), LUMBAR: ICD-10-CM

## 2022-11-28 DIAGNOSIS — R42 DIZZINESS: ICD-10-CM

## 2022-11-28 DIAGNOSIS — M96.1 POST LAMINECTOMY SYNDROME: ICD-10-CM

## 2022-11-28 DIAGNOSIS — F41.9 ANXIETY: ICD-10-CM

## 2022-11-28 DIAGNOSIS — R42 DIZZINESS AND GIDDINESS: ICD-10-CM

## 2022-11-28 DIAGNOSIS — G89.4 CHRONIC PAIN SYNDROME: ICD-10-CM

## 2022-11-28 DIAGNOSIS — S39.012D LUMBOSACRAL STRAIN, SUBSEQUENT ENCOUNTER: ICD-10-CM

## 2022-11-28 PROCEDURE — 4010F ACE/ARB THERAPY RXD/TAKEN: CPT | Mod: CPTII,S$GLB,, | Performed by: FAMILY MEDICINE

## 2022-11-28 PROCEDURE — 99214 PR OFFICE/OUTPT VISIT, EST, LEVL IV, 30-39 MIN: ICD-10-PCS | Mod: S$GLB,,, | Performed by: FAMILY MEDICINE

## 2022-11-28 PROCEDURE — 99999 PR PBB SHADOW E&M-EST. PATIENT-LVL V: ICD-10-PCS | Mod: PBBFAC,,, | Performed by: FAMILY MEDICINE

## 2022-11-28 PROCEDURE — 3074F SYST BP LT 130 MM HG: CPT | Mod: CPTII,S$GLB,, | Performed by: FAMILY MEDICINE

## 2022-11-28 PROCEDURE — 3074F PR MOST RECENT SYSTOLIC BLOOD PRESSURE < 130 MM HG: ICD-10-PCS | Mod: CPTII,S$GLB,, | Performed by: FAMILY MEDICINE

## 2022-11-28 PROCEDURE — 4010F PR ACE/ARB THEARPY RXD/TAKEN: ICD-10-PCS | Mod: CPTII,S$GLB,, | Performed by: FAMILY MEDICINE

## 2022-11-28 PROCEDURE — 3079F DIAST BP 80-89 MM HG: CPT | Mod: CPTII,S$GLB,, | Performed by: FAMILY MEDICINE

## 2022-11-28 PROCEDURE — 1159F PR MEDICATION LIST DOCUMENTED IN MEDICAL RECORD: ICD-10-PCS | Mod: CPTII,S$GLB,, | Performed by: FAMILY MEDICINE

## 2022-11-28 PROCEDURE — 3079F PR MOST RECENT DIASTOLIC BLOOD PRESSURE 80-89 MM HG: ICD-10-PCS | Mod: CPTII,S$GLB,, | Performed by: FAMILY MEDICINE

## 2022-11-28 PROCEDURE — 99999 PR PBB SHADOW E&M-EST. PATIENT-LVL V: CPT | Mod: PBBFAC,,, | Performed by: FAMILY MEDICINE

## 2022-11-28 PROCEDURE — 1159F MED LIST DOCD IN RCRD: CPT | Mod: CPTII,S$GLB,, | Performed by: FAMILY MEDICINE

## 2022-11-28 PROCEDURE — 99214 OFFICE O/P EST MOD 30 MIN: CPT | Mod: S$GLB,,, | Performed by: FAMILY MEDICINE

## 2022-11-28 PROCEDURE — 3008F PR BODY MASS INDEX (BMI) DOCUMENTED: ICD-10-PCS | Mod: CPTII,S$GLB,, | Performed by: FAMILY MEDICINE

## 2022-11-28 PROCEDURE — 3008F BODY MASS INDEX DOCD: CPT | Mod: CPTII,S$GLB,, | Performed by: FAMILY MEDICINE

## 2022-11-28 NOTE — PROGRESS NOTES
Subjective:       Patient ID: Collin Almaguer Sr. is a 57 y.o. male.    Chief Complaint: MRI Result and Low Testosterone     HPI 57-year-old white male in for follow-up MRI results in low testosterone--missed 2 appts.  Testosterone low --got shots for one year--told blood to thick needs donate blood   [facility] [place] [facility]--recent MRI showed L3-S1 posterior lumbar fusion with decompression laminectomy/moderate to severe central canal narrowing L2-3 secondary to a broad-based bulging disc with facet hypertrophy--has appt with him Dec 22nd   Hx bilateral hip pain told due osteoarthritis   .As goes get off sofa dizzy .  Had echocardiogram 24 Holter angiogram showing almost total blockage in 1 vessel but no stent was placed--he doubled atorvastatin and added metoprolol  Carotid ultrasound was normal        office visit 08/16/2022 57 White male--c/o SOB --disabled since 2010 has gain weight---gets tired out easily doing different chores around the house--- chest pain since Aug 4th stopped 5 PM yesterday. --quality --felt like a pressure sensation like something going to explode in chest--severity --8/10---constant--not sure what made it worse or made it better--went few days without eating.   Occas one side face get numb and arm numb and lock jaw -- last few seconds and leaves.  Pt used drink 5 hr [employer] one a day x 20 yrs    Pt goes to pain management .  Told has 4 rods--2 rods are in lumbar spine with fusion--and laminectomy--has 2 rods in hips        ROS: unremarkable except for HPI  Skin: no psoriasis, eczema, skin cancer--Skin Cancer left cheek--Dr Wise-seen past not seen recently   HEENT: +occs  headache, ocular pain, blurred vision, diplopia, epistaxis, hoarseness change in voice, thyroid trouble +glaucoma itis bilaterally left greater than right  Lung: No pneumonia, +asthma as child ,no  Tb, wheezing, SOB, no smoking not sure if wheezes   Heart: no chest pain,no ankle edema,  palpitations, MI, tiffanie murmur,+hypertension,+ hyperlipidemia--no stent bypass arrhythmia--never seen by cardiologist   Abdomen: +nausea, no  vomiting, diarrhea,+ constipation-a lot better no  ulcers, hepatitis, gallbladder disease, melena, hematochezia, hematemesis   : no UTI, renal disease, stones prostate  MS: no fractures, O/A, lupus, rheumatoid, gout--neck and back problem--old xray  OK --laminectomy and rods hips --last fall patient gel over pile of debris in yd 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  Neuro: + Dizziness, no  LOC, seizures   No diabetes, no anemia, +anxiety, + depression on meds Disabled  --being home all the time makes patient depressed--found father is at home  2014  , 5 children, disability lives alone        Objective:   Physical Exam:  General: Well nourished, well developed, no acute distress + morbid obesity  Skin:  No lesion   HEENT: Eyes PERRLA, EOM intact, wax blocking left ear canal nose patent, throat non-erythematous ears TMs clear  NECK: Supple, no bruits, No JVD, no nodes   Lungs: Clear, no rales, rhonchi, wheezing  Heart: Regular rate and rhythm, no murmurs, gallops, or rubs  Abdomen: flat, bowel sounds positive, no tenderness, or organomegaly some slight ventral herniation--mild--some complaints of right costal angle swelling that comes and goes suggestive of hernia  Genitalia circumcised no hernia tests normal  Rectal exam negative   MS:-- tenderness lumbar spine L1-S1--anterior flexion 10° extension 10° lateral flexion rotation 10° straight leg lift pulling sensation back no radiculopathy---able squat snf down eases arise--has some numbness in the right anterior lateral thigh area  Neuro: Alert, CN intact, oriented X 3 Romberg negative heel-toe slight swaying due back issues   Extremities: No cyanosis, clubbing, or edema         Assessment:       1. Low testosterone    2. Dizziness and giddiness    3. Dizziness    4. Lumbosacral  strain, subsequent encounter    5. DDD (degenerative disc disease), lumbar    6. DDD (degenerative disc disease), cervical    7. Chronic pain syndrome    8. History of lumbar fusion    9. Hypertension, unspecified type    10. Pure hypercholesterolemia    11. Anxiety    12. Post laminectomy syndrome          Plan:       Low testosterone  -     Ambulatory referral/consult to Urology; Future; Expected date: 12/05/2022    Dizziness and giddiness  -     MRI Brain W WO Contrast; Future; Expected date: 11/28/2022    Dizziness    Lumbosacral strain, subsequent encounter  -     Ambulatory referral/consult to Neurosurgery; Future; Expected date: 12/05/2022    DDD (degenerative disc disease), lumbar    DDD (degenerative disc disease), cervical  -     Ambulatory referral/consult to Neurosurgery; Future; Expected date: 12/05/2022    Chronic pain syndrome    History of lumbar fusion    Hypertension, unspecified type    Pure hypercholesterolemia    Anxiety    Post laminectomy syndrome        Multiple medical issues --almost positive review of systems  Low testosterone patient needs to see Urology--hx polycythemic with prior treatment of testosterone  MRI lumbar spine showed fusion L3-S1 with laminectomy/L1 to with some moderate to severe cord compression due to bulging disc and facet hyper  Dizziness will get MRI brain   Chronic back pain--goes to chronic pain clinic---x-ray reviewed from 2020 of pelvic--shows lumbar fusion--with laminectomy--with rods going to both hips--patient states had recent x-ray had diagnostic imaging to be reviewed by Neuro surgery  Neuropathy right anterior thigh--quadriceps area--patient worried dysvascular will do EMG study--up feel neuropathy is coming from the back and prior back surgeries and possible scar tissue  Morbid obesity--exercise trying get down to ideal body weight--could consider gastric sleeve or bypass --patient was asking me for Adipex told I do not write it ecent   Multiple other  medical issues not addressed this visit  Depression--needs to see a psychiatrist--multiple issues that need to be addressed should be on medication for depression--needs to see psychiatrist--if on SSRI such as Lexapro initially would be helpful with weight but in time but making gain weight may benefit from Wellbutrin  Hyperlipidemia patient on Lipitor tri cor  Decreased hearing left ear--cerumen impaction--unable to visualize ear canal  Hypertension/hyperlipidemia Vasotec and vasoretic  for blood pressure on  Lipitor for cholesterol--needs to exercise as tolerated try to get to ideal body weight--Needs arm blood pressure cuff check blood pressure daily Needs blood pressure be 140/90 or less and greater than 90/60  History hypogonadism has been on testosterone   glaucoma/constipation/obesity/anxiety/depression  Lab done in December needs to redo lab in 6 months in June CBCs CMP lipid free and total testosterone  Needs to see neurosurgeon to evaluate lower back  Health maintenance tetanus pneumococcal vaccine flu shingles

## 2022-11-28 NOTE — TELEPHONE ENCOUNTER
----- Message from Terrell Jaime sent at 11/28/2022  4:56 PM CST -----  Contact: self [phone]  Pt stated he can provide the dates to all procedure provider has unknown next to starting from 2010.    Please call and advise

## 2022-11-29 ENCOUNTER — PATIENT MESSAGE (OUTPATIENT)
Dept: CARDIOLOGY | Facility: CLINIC | Age: 57
End: 2022-11-29
Payer: MEDICARE

## 2022-11-30 ENCOUNTER — PATIENT MESSAGE (OUTPATIENT)
Dept: PRIMARY CARE CLINIC | Facility: CLINIC | Age: 57
End: 2022-11-30
Payer: MEDICARE

## 2022-11-30 ENCOUNTER — PATIENT MESSAGE (OUTPATIENT)
Dept: CARDIOLOGY | Facility: CLINIC | Age: 57
End: 2022-11-30
Payer: MEDICARE

## 2022-11-30 RX ORDER — ISOSORBIDE MONONITRATE 30 MG/1
30 TABLET, EXTENDED RELEASE ORAL DAILY
Qty: 30 TABLET | Refills: 11 | Status: SHIPPED | OUTPATIENT
Start: 2022-11-30 | End: 2023-12-01 | Stop reason: SDUPTHER

## 2022-11-30 NOTE — TELEPHONE ENCOUNTER
No new care gaps identified.  [facility] Embedded Care Gaps. Reference number: [id number]. 11/30/2022   10:10:29 AM CST

## 2022-12-03 RX ORDER — SERTRALINE HYDROCHLORIDE 100 MG/1
100 TABLET, FILM COATED ORAL DAILY
Qty: 90 TABLET | Refills: 1 | Status: SHIPPED | OUTPATIENT
Start: 2022-12-03 | End: 2023-05-31 | Stop reason: SDUPTHER

## 2022-12-03 RX ORDER — ALFUZOSIN HYDROCHLORIDE 10 MG/1
10 TABLET, EXTENDED RELEASE ORAL DAILY
Qty: 90 TABLET | Refills: 1 | Status: SHIPPED | OUTPATIENT
Start: 2022-12-03 | End: 2023-09-26

## 2022-12-06 ENCOUNTER — PATIENT MESSAGE (OUTPATIENT)
Dept: NEUROSURGERY | Facility: CLINIC | Age: 57
End: 2022-12-06
Payer: MEDICARE

## 2022-12-06 ENCOUNTER — PATIENT MESSAGE (OUTPATIENT)
Dept: UROLOGY | Facility: CLINIC | Age: 57
End: 2022-12-06
Payer: MEDICARE

## 2022-12-12 ENCOUNTER — PATIENT MESSAGE (OUTPATIENT)
Dept: PRIMARY CARE CLINIC | Facility: CLINIC | Age: 57
End: 2022-12-12
Payer: MEDICARE

## 2022-12-20 ENCOUNTER — PATIENT MESSAGE (OUTPATIENT)
Dept: UROLOGY | Facility: CLINIC | Age: 57
End: 2022-12-20
Payer: MEDICARE

## 2022-12-24 ENCOUNTER — PATIENT MESSAGE (OUTPATIENT)
Dept: UROLOGY | Facility: CLINIC | Age: 57
End: 2022-12-24
Payer: MEDICARE

## 2022-12-30 ENCOUNTER — PATIENT MESSAGE (OUTPATIENT)
Dept: NEUROSURGERY | Facility: CLINIC | Age: 57
End: 2022-12-30
Payer: MEDICARE

## 2022-12-30 ENCOUNTER — PATIENT MESSAGE (OUTPATIENT)
Dept: UROLOGY | Facility: CLINIC | Age: 57
End: 2022-12-30
Payer: MEDICARE

## 2023-01-03 ENCOUNTER — PATIENT MESSAGE (OUTPATIENT)
Dept: UROLOGY | Facility: CLINIC | Age: 58
End: 2023-01-03

## 2023-01-03 NOTE — TELEPHONE ENCOUNTER
Please call patient and let him know that we are sorry that he is not feeling well and he should reschedule at his convenience.

## 2023-01-06 ENCOUNTER — TELEPHONE (OUTPATIENT)
Dept: NEUROSURGERY | Facility: CLINIC | Age: 58
End: 2023-01-06
Payer: MEDICARE

## 2023-01-10 ENCOUNTER — PATIENT MESSAGE (OUTPATIENT)
Dept: NEUROSURGERY | Facility: CLINIC | Age: 58
End: 2023-01-10

## 2023-02-03 ENCOUNTER — TELEPHONE (OUTPATIENT)
Dept: PRIMARY CARE CLINIC | Facility: CLINIC | Age: 58
End: 2023-02-03
Payer: MEDICARE

## 2023-02-03 NOTE — TELEPHONE ENCOUNTER
----- Message from Kay Degroot sent at 2/3/2023  4:45 PM CST -----  Contact: [phone]  Pt called to advise that he hurt his right foot and it is now turning colors. Pt says he is still having pain and is now ready to have it looked at. He says he has tried icing and elevating his affected foot. Pt would like to be seen before the next available (3/1/23). Please Advise

## 2023-02-06 ENCOUNTER — PATIENT MESSAGE (OUTPATIENT)
Dept: NEUROSURGERY | Facility: CLINIC | Age: 58
End: 2023-02-06
Payer: MEDICARE

## 2023-02-14 ENCOUNTER — OFFICE VISIT (OUTPATIENT)
Dept: PRIMARY CARE CLINIC | Facility: CLINIC | Age: 58
End: 2023-02-14
Payer: MEDICARE

## 2023-02-14 VITALS
HEART RATE: 102 BPM | BODY MASS INDEX: 38.46 KG/M2 | RESPIRATION RATE: 18 BRPM | WEIGHT: 259.69 LBS | TEMPERATURE: 97 F | OXYGEN SATURATION: 96 % | SYSTOLIC BLOOD PRESSURE: 120 MMHG | HEIGHT: 69 IN | DIASTOLIC BLOOD PRESSURE: 76 MMHG

## 2023-02-14 DIAGNOSIS — F32.0 CURRENT MILD EPISODE OF MAJOR DEPRESSIVE DISORDER WITHOUT PRIOR EPISODE: ICD-10-CM

## 2023-02-14 DIAGNOSIS — I10 HYPERTENSION, UNSPECIFIED TYPE: ICD-10-CM

## 2023-02-14 DIAGNOSIS — G44.209 TENSION-TYPE HEADACHE, NOT INTRACTABLE, UNSPECIFIED CHRONICITY PATTERN: ICD-10-CM

## 2023-02-14 DIAGNOSIS — M79.671 RIGHT FOOT PAIN: ICD-10-CM

## 2023-02-14 DIAGNOSIS — E29.1 HYPOGONADISM MALE: ICD-10-CM

## 2023-02-14 DIAGNOSIS — E78.00 PURE HYPERCHOLESTEROLEMIA: ICD-10-CM

## 2023-02-14 DIAGNOSIS — R79.89 LOW TESTOSTERONE: ICD-10-CM

## 2023-02-14 DIAGNOSIS — Z13.1 SCREENING FOR DIABETES MELLITUS: Primary | ICD-10-CM

## 2023-02-14 DIAGNOSIS — R73.01 IMPAIRED FASTING GLUCOSE: ICD-10-CM

## 2023-02-14 DIAGNOSIS — M96.1 POST LAMINECTOMY SYNDROME: ICD-10-CM

## 2023-02-14 DIAGNOSIS — E66.9 OBESITY (BMI 35.0-39.9 WITHOUT COMORBIDITY): ICD-10-CM

## 2023-02-14 DIAGNOSIS — F41.9 ANXIETY: ICD-10-CM

## 2023-02-14 PROBLEM — E66.01 MORBID OBESITY WITH BMI OF 40.0-44.9, ADULT: Status: RESOLVED | Noted: 2020-12-09 | Resolved: 2023-02-14

## 2023-02-14 PROCEDURE — 96372 PR INJECTION,THERAP/PROPH/DIAG2ST, IM OR SUBCUT: ICD-10-PCS | Mod: S$GLB,,, | Performed by: FAMILY MEDICINE

## 2023-02-14 PROCEDURE — 3074F PR MOST RECENT SYSTOLIC BLOOD PRESSURE < 130 MM HG: ICD-10-PCS | Mod: CPTII,S$GLB,, | Performed by: FAMILY MEDICINE

## 2023-02-14 PROCEDURE — 99999 PR PBB SHADOW E&M-EST. PATIENT-LVL V: ICD-10-PCS | Mod: PBBFAC,,, | Performed by: FAMILY MEDICINE

## 2023-02-14 PROCEDURE — 3008F BODY MASS INDEX DOCD: CPT | Mod: CPTII,S$GLB,, | Performed by: FAMILY MEDICINE

## 2023-02-14 PROCEDURE — 99999 PR PBB SHADOW E&M-EST. PATIENT-LVL V: CPT | Mod: PBBFAC,,, | Performed by: FAMILY MEDICINE

## 2023-02-14 PROCEDURE — 3078F PR MOST RECENT DIASTOLIC BLOOD PRESSURE < 80 MM HG: ICD-10-PCS | Mod: CPTII,S$GLB,, | Performed by: FAMILY MEDICINE

## 2023-02-14 PROCEDURE — 3078F DIAST BP <80 MM HG: CPT | Mod: CPTII,S$GLB,, | Performed by: FAMILY MEDICINE

## 2023-02-14 PROCEDURE — 99214 PR OFFICE/OUTPT VISIT, EST, LEVL IV, 30-39 MIN: ICD-10-PCS | Mod: 25,S$GLB,, | Performed by: FAMILY MEDICINE

## 2023-02-14 PROCEDURE — 3008F PR BODY MASS INDEX (BMI) DOCUMENTED: ICD-10-PCS | Mod: CPTII,S$GLB,, | Performed by: FAMILY MEDICINE

## 2023-02-14 PROCEDURE — 1159F MED LIST DOCD IN RCRD: CPT | Mod: CPTII,S$GLB,, | Performed by: FAMILY MEDICINE

## 2023-02-14 PROCEDURE — 96372 THER/PROPH/DIAG INJ SC/IM: CPT | Mod: S$GLB,,, | Performed by: FAMILY MEDICINE

## 2023-02-14 PROCEDURE — 3074F SYST BP LT 130 MM HG: CPT | Mod: CPTII,S$GLB,, | Performed by: FAMILY MEDICINE

## 2023-02-14 PROCEDURE — 99214 OFFICE O/P EST MOD 30 MIN: CPT | Mod: 25,S$GLB,, | Performed by: FAMILY MEDICINE

## 2023-02-14 PROCEDURE — 1159F PR MEDICATION LIST DOCUMENTED IN MEDICAL RECORD: ICD-10-PCS | Mod: CPTII,S$GLB,, | Performed by: FAMILY MEDICINE

## 2023-02-14 RX ORDER — PREDNISONE 20 MG/1
20 TABLET ORAL DAILY
Qty: 10 TABLET | Refills: 0 | Status: SHIPPED | OUTPATIENT
Start: 2023-02-14 | End: 2023-05-10

## 2023-02-14 RX ORDER — ALBUTEROL SULFATE 90 UG/1
2 AEROSOL, METERED RESPIRATORY (INHALATION) EVERY 4 HOURS PRN
Qty: 18 G | Refills: 5 | Status: SHIPPED | OUTPATIENT
Start: 2023-02-14 | End: 2024-02-06 | Stop reason: SDUPTHER

## 2023-02-14 RX ORDER — CYCLOBENZAPRINE HCL 10 MG
10 TABLET ORAL NIGHTLY PRN
Qty: 30 TABLET | Refills: 2 | Status: SHIPPED | OUTPATIENT
Start: 2023-02-14 | End: 2024-02-06 | Stop reason: SDUPTHER

## 2023-02-14 RX ORDER — TRIAMCINOLONE ACETONIDE 40 MG/ML
40 INJECTION, SUSPENSION INTRA-ARTICULAR; INTRAMUSCULAR ONCE
Status: COMPLETED | OUTPATIENT
Start: 2023-02-14 | End: 2023-02-14

## 2023-02-14 RX ORDER — MELOXICAM 7.5 MG/1
7.5 TABLET ORAL DAILY
Qty: 60 TABLET | Refills: 5 | Status: SHIPPED | OUTPATIENT
Start: 2023-02-14 | End: 2024-02-06 | Stop reason: SDUPTHER

## 2023-02-14 RX ADMIN — TRIAMCINOLONE ACETONIDE 40 MG: 40 INJECTION, SUSPENSION INTRA-ARTICULAR; INTRAMUSCULAR at 04:02

## 2023-02-14 NOTE — PROGRESS NOTES
Subjective:       Patient ID: Collin Almaguer Sr. is a 58 y.o. male.    Chief Complaint: Foot Pain (Right)       HPI 58-year-old white --right foot pain--started 2-3 weeks ago--does not recall any trauma or any excessive activity--had injured right ankle years ago playing basketball about 40 years ago.  Pain was initially in the 1st MTP radiated over the dorsum of the foot to the anterior shin to the knee and also across the arch to the medial malleolus area and plantar surface of the foot--no lupus rheumatoid gout  Pt was suppose get MRI brain had to cancel a couple times because patient was sick.   Testosterone MD --saw DR Parks --wants see someone here    Hx pulmonary --chronic cough --asthma as a child--wheezing in the throat with severe cough--had angiogram in September       ROS:   Skin: no psoriasis, eczema, skin cancer--Skin Cancer left cheek--Dr Wise-seen past not seen recently   HEENT: +occs  headache, ocular pain, blurred vision, diplopia, epistaxis, hoarseness change in voice, thyroid trouble +glaucoma itis bilaterally left greater than right  Lung: No pneumonia, +asthma as child ,no  Tb, wheezing, SOB, no smoking not sure if wheezes   Heart: no chest pain,no ankle edema, palpitations, MI, tiffanie murmur,+hypertension,+ hyperlipidemia--no stent bypass arrhythmia--never seen by cardiologist   Abdomen: +nausea, no  vomiting, diarrhea,+ constipation-a lot better no  ulcers, hepatitis, gallbladder disease, melena, hematochezia, hematemesis   : no UTI, renal disease, stones prostate  MS: no fractures, O/A, lupus, rheumatoid, gout--neck and back problem--old xray  OK --laminectomy and rods hips --last fall patient gel over pile of debris in yd 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  Neuro: + Dizziness, no  LOC, seizures   No diabetes, no anemia, +anxiety, + depression on meds Disabled  --being home all the time makes patient depressed--found father is at home  April  2014  , 5 children, disability lives alone        Objective:   Physical Exam:  General: Well nourished, well developed, no acute distress + morbid obesity  Skin:  No lesion   HEENT: Eyes PERRLA, EOM intact, wax blocking left ear canal nose patent, throat non-erythematous ears TMs clear  NECK: Supple, no bruits, No JVD, no nodes   Lungs: Clear, no rales, rhonchi, wheezing  Heart: Regular rate and rhythm, no murmurs, gallops, or rubs  Abdomen: flat, bowel sounds positive, no tenderness, or organomegaly some slight ventral herniation--mild--some complaints of right costal angle swelling that comes and goes suggestive of hernia  Genitalia circumcised no hernia tests normal  Rectal exam negative   MS:-- tenderness might 1st MTP--dorsum of the foot--pain with palpation--pain with flexion extension of the foot and inversion eversion-pain dorsum of the foot 1st MTP and left medial malleolus  Neuro: Alert, CN intact, oriented X 3 Romberg negative heel-toe slight swaying due back issues   Extremities: No cyanosis, clubbing, or edema         Assessment:       1. Screening for diabetes mellitus    2. Right foot pain    3. Post laminectomy syndrome    4. Low testosterone    5. Hypogonadism male    6. Hypertension, unspecified type    7. Pure hypercholesterolemia    8. Anxiety    9. Current mild episode of major depressive disorder without prior episode    10. Tension-type headache, not intractable, unspecified chronicity pattern    11. Impaired fasting glucose          Plan:       Screening for diabetes mellitus  -     Hemoglobin A1C; Future; Expected date: 02/14/2023    Right foot pain    Post laminectomy syndrome    Low testosterone  -     Ambulatory referral/consult to Urology; Future; Expected date: 02/21/2023    Hypogonadism male  -     Ambulatory referral/consult to Urology; Future; Expected date: 02/21/2023    Hypertension, unspecified type    Pure hypercholesterolemia    Anxiety    Current mild episode of major  depressive disorder without prior episode    Tension-type headache, not intractable, unspecified chronicity pattern  -     MRI Brain W WO Contrast; Future; Expected date: 02/14/2023    Impaired fasting glucose  -     Hemoglobin A1C; Future; Expected date: 02/14/2023    Other orders  -     cyclobenzaprine (FLEXERIL) 10 MG tablet; Take 1 tablet (10 mg total) by mouth nightly as needed for Muscle spasms.  Dispense: 30 tablet; Refill: 2  -     albuterol (PROVENTIL/VENTOLIN HFA) 90 mcg/actuation inhaler; Inhale 2 puffs into the lungs every 4 (four) hours as needed. Rescue  Dispense: 18 g; Refill: 5  -     triamcinolone acetonide injection 40 mg  -     predniSONE (DELTASONE) 20 MG tablet; Take 1 tablet (20 mg total) by mouth once daily.  Dispense: 10 tablet; Refill: 0  -     meloxicam (MOBIC) 7.5 MG tablet; Take 1 tablet (7.5 mg total) by mouth once daily.  Dispense: 60 tablet; Refill: 5          Multiple medical issues --  Left foot pain--1st MTP--dorsum of the foot--left medial malleolus--left shin--Kenalog/prednisone 20 mg 1 p.o. q.d. times 10 days/Mobic 7.5 mg 1 p.o. b.i.d.  MRI the brain--due to headache   Low testosterone patient needs to see Urology--hx polycythemic with prior treatment of testosterone  MRI lumbar spine showed fusion L3-S1 with laminectomy/L1 to with some moderate to severe cord compression due to bulging disc and facet hyper  Dizziness will get MRI brain   Chronic back pain--goes to chronic pain clinic---x-ray reviewed from 2020 of pelvic--shows lumbar fusion--with laminectomy--with rods going to both hips--patient states had recent x-ray had diagnostic imaging to be reviewed by Neuro surgery  Neuropathy right anterior thigh--quadriceps area--patient worried dysvascular will do EMG study--up feel neuropathy is coming from the back and prior back surgeries and possible scar tissue  Other medical issues  Morbid obesity--exercise trying get down to ideal body weight--could consider gastric sleeve or  bypass --patient was asking me for Adipex told I do not write it ecent   Multiple other medical issues not addressed this visit  Depression--needs to see a psychiatrist--multiple issues that need to be addressed should be on medication for depression--needs to see psychiatrist--if on SSRI such as Lexapro initially would be helpful with weight but in time but making gain weight may benefit from Wellbutrin  Hyperlipidemia patient on Lipitor tri cor  Decreased hearing left ear--cerumen impaction--unable to visualize ear canal  Hypertension/hyperlipidemia Vasotec and vasoretic  for blood pressure on  Lipitor for cholesterol--needs to exercise as tolerated try to get to ideal body weight--Needs arm blood pressure cuff check blood pressure daily Needs blood pressure be 140/90 or less and greater than 90/60  Lab done in December needs to redo lab in 6 months in June CBCs CMP lipid free and total testosterone  Needs to see neurosurgeon to evaluate lower back  Health maintenance see she

## 2023-02-15 ENCOUNTER — PATIENT MESSAGE (OUTPATIENT)
Dept: PRIMARY CARE CLINIC | Facility: CLINIC | Age: 58
End: 2023-02-15
Payer: MEDICARE

## 2023-02-24 ENCOUNTER — PATIENT MESSAGE (OUTPATIENT)
Dept: NEUROSURGERY | Facility: CLINIC | Age: 58
End: 2023-02-24
Payer: MEDICARE

## 2023-03-06 ENCOUNTER — PATIENT MESSAGE (OUTPATIENT)
Dept: NEUROSURGERY | Facility: CLINIC | Age: 58
End: 2023-03-06
Payer: MEDICARE

## 2023-03-08 ENCOUNTER — OFFICE VISIT (OUTPATIENT)
Dept: UROLOGY | Facility: CLINIC | Age: 58
End: 2023-03-08
Payer: MEDICARE

## 2023-03-08 ENCOUNTER — TELEPHONE (OUTPATIENT)
Dept: UROLOGY | Facility: CLINIC | Age: 58
End: 2023-03-08
Payer: MEDICARE

## 2023-03-08 VITALS
BODY MASS INDEX: 36.79 KG/M2 | HEIGHT: 69 IN | SYSTOLIC BLOOD PRESSURE: 121 MMHG | DIASTOLIC BLOOD PRESSURE: 71 MMHG | HEART RATE: 101 BPM | WEIGHT: 248.38 LBS

## 2023-03-08 DIAGNOSIS — Z12.5 ENCOUNTER FOR SCREENING FOR MALIGNANT NEOPLASM OF PROSTATE: ICD-10-CM

## 2023-03-08 DIAGNOSIS — E29.1 HYPOGONADISM MALE: ICD-10-CM

## 2023-03-08 DIAGNOSIS — R79.89 LOW TESTOSTERONE: ICD-10-CM

## 2023-03-08 PROCEDURE — 99204 PR OFFICE/OUTPT VISIT, NEW, LEVL IV, 45-59 MIN: ICD-10-PCS | Mod: S$GLB,,, | Performed by: UROLOGY

## 2023-03-08 PROCEDURE — 3008F PR BODY MASS INDEX (BMI) DOCUMENTED: ICD-10-PCS | Mod: CPTII,S$GLB,, | Performed by: UROLOGY

## 2023-03-08 PROCEDURE — 1160F RVW MEDS BY RX/DR IN RCRD: CPT | Mod: CPTII,S$GLB,, | Performed by: UROLOGY

## 2023-03-08 PROCEDURE — 3074F SYST BP LT 130 MM HG: CPT | Mod: CPTII,S$GLB,, | Performed by: UROLOGY

## 2023-03-08 PROCEDURE — 99204 OFFICE O/P NEW MOD 45 MIN: CPT | Mod: S$GLB,,, | Performed by: UROLOGY

## 2023-03-08 PROCEDURE — 99999 PR PBB SHADOW E&M-EST. PATIENT-LVL IV: CPT | Mod: PBBFAC,,, | Performed by: UROLOGY

## 2023-03-08 PROCEDURE — 1159F PR MEDICATION LIST DOCUMENTED IN MEDICAL RECORD: ICD-10-PCS | Mod: CPTII,S$GLB,, | Performed by: UROLOGY

## 2023-03-08 PROCEDURE — 3074F PR MOST RECENT SYSTOLIC BLOOD PRESSURE < 130 MM HG: ICD-10-PCS | Mod: CPTII,S$GLB,, | Performed by: UROLOGY

## 2023-03-08 PROCEDURE — 3008F BODY MASS INDEX DOCD: CPT | Mod: CPTII,S$GLB,, | Performed by: UROLOGY

## 2023-03-08 PROCEDURE — 1160F PR REVIEW ALL MEDS BY PRESCRIBER/CLIN PHARMACIST DOCUMENTED: ICD-10-PCS | Mod: CPTII,S$GLB,, | Performed by: UROLOGY

## 2023-03-08 PROCEDURE — 1159F MED LIST DOCD IN RCRD: CPT | Mod: CPTII,S$GLB,, | Performed by: UROLOGY

## 2023-03-08 PROCEDURE — 99999 PR PBB SHADOW E&M-EST. PATIENT-LVL IV: ICD-10-PCS | Mod: PBBFAC,,, | Performed by: UROLOGY

## 2023-03-08 PROCEDURE — 3078F PR MOST RECENT DIASTOLIC BLOOD PRESSURE < 80 MM HG: ICD-10-PCS | Mod: CPTII,S$GLB,, | Performed by: UROLOGY

## 2023-03-08 PROCEDURE — 3078F DIAST BP <80 MM HG: CPT | Mod: CPTII,S$GLB,, | Performed by: UROLOGY

## 2023-03-08 NOTE — TELEPHONE ENCOUNTER
LVM about needing to schedule bloodwork and FU appointment with Dr. Galvez in [place] 10 days after second bloodwork has been completed. Awaiting response.

## 2023-03-08 NOTE — Clinical Note
He needs labs x2, both early AM, about 1 week apart (2nd lab is just testosterone panel; 1st lab can be everything else) FU with me at least 10 days after 2nd labs, at [facility]

## 2023-03-08 NOTE — PROGRESS NOTES
"Subjective:      Collin Almaguer Sr. is a 58 y.o. male who was referred by Jaret Armendariz MD for evaluation of low testosterone.      Hypogonadism  The patient reports a recent lab test indicating low testosterone.  This test was done by PCP due to symptoms and h/o low testosterone.  He reports associated symptoms including fatigue and decreased libido.  These symptoms have been present for several years and were gradual in onset.  Previous treatment has included testosterone injections per Dr. Parks, which he stopped a few years ago due to elevated Hgb.     Of note he had recent diagnosis of remote MI and had angiogram that showed blockages which were not treated.    The following portions of the patient's history were reviewed and updated as appropriate: allergies, current medications, past family history, past medical history, past social history, past surgical history and problem list.    Review of Systems  Constitutional: no fever or chills  ENT: no nasal congestion or sore throat  Respiratory: no cough or shortness of breath  Cardiovascular: no chest pain or palpitations  Gastrointestinal: no nausea or vomiting, tolerating diet  Genitourinary: as per HPI  Hematologic/Lymphatic: no easy bruising or lymphadenopathy  Musculoskeletal: no arthralgias or myalgias  Neurological: no seizures or tremors  Behavioral/Psych: no auditory or visual hallucinations     Objective:   Vitals: /71   Pulse 101   Ht 5' 9" (1.753 m)   Wt 112.6 kg (248 lb 5.6 oz)   BMI 36.67 kg/m²     Physical Exam   General: alert and oriented, no acute distress  Head: normocephalic, atraumatic  Neck: supple, no lymphadenopathy, normal ROM, no masses  Respiratory: Symmetric expansion, non-labored breathing  Neuro: alert and oriented x3, no gross deficits  Psych: normal judgment and insight, normal mood/affect, and non-anxious    Lab Review     Lab Results   Component Value Date    WBC 8.54 09/23/2022    HGB 11.4 (L) 09/23/2022    " HCT 35.7 (L) 09/23/2022    MCV 93 09/23/2022     09/23/2022     Lab Results   Component Value Date    CREATININE 1.2 11/18/2022    BUN 20 11/18/2022     Lab Results   Component Value Date    PSA 0.98 05/01/2020         Assessment:     1. Low testosterone    2. Hypogonadism male        Plan:   Needs full lab workup - will schedule today  FU after above to review

## 2023-03-13 ENCOUNTER — TELEPHONE (OUTPATIENT)
Dept: ORTHOPEDICS | Facility: CLINIC | Age: 58
End: 2023-03-13
Payer: MEDICARE

## 2023-03-13 NOTE — PROGRESS NOTES
Left message for pt on 3/13/23 @ 8:51 am to call back regarding if Pt got an MRI and if so to bring disc.

## 2023-03-15 ENCOUNTER — TELEPHONE (OUTPATIENT)
Dept: NEUROSURGERY | Facility: CLINIC | Age: 58
End: 2023-03-15
Payer: MEDICARE

## 2023-03-15 ENCOUNTER — OFFICE VISIT (OUTPATIENT)
Dept: NEUROSURGERY | Facility: CLINIC | Age: 58
End: 2023-03-15
Payer: MEDICARE

## 2023-03-15 VITALS
SYSTOLIC BLOOD PRESSURE: 103 MMHG | BODY MASS INDEX: 36.67 KG/M2 | HEART RATE: 93 BPM | DIASTOLIC BLOOD PRESSURE: 73 MMHG | HEIGHT: 69 IN

## 2023-03-15 DIAGNOSIS — M51.36 DDD (DEGENERATIVE DISC DISEASE), LUMBAR: ICD-10-CM

## 2023-03-15 DIAGNOSIS — M48.061 SPINAL STENOSIS, LUMBAR REGION WITHOUT NEUROGENIC CLAUDICATION: ICD-10-CM

## 2023-03-15 DIAGNOSIS — Z98.1 HISTORY OF LUMBAR FUSION: ICD-10-CM

## 2023-03-15 DIAGNOSIS — M54.50 CHRONIC BILATERAL LOW BACK PAIN WITHOUT SCIATICA: Primary | ICD-10-CM

## 2023-03-15 DIAGNOSIS — M43.8X9 SAGITTAL PLANE IMBALANCE: ICD-10-CM

## 2023-03-15 DIAGNOSIS — Z98.1 HISTORY OF LUMBAR FUSION: Primary | ICD-10-CM

## 2023-03-15 DIAGNOSIS — G89.29 CHRONIC BILATERAL LOW BACK PAIN WITHOUT SCIATICA: Primary | ICD-10-CM

## 2023-03-15 PROCEDURE — 99999 PR PBB SHADOW E&M-EST. PATIENT-LVL IV: ICD-10-PCS | Mod: PBBFAC,,, | Performed by: NEUROLOGICAL SURGERY

## 2023-03-15 PROCEDURE — 99215 PR OFFICE/OUTPT VISIT, EST, LEVL V, 40-54 MIN: ICD-10-PCS | Mod: S$GLB,,, | Performed by: NEUROLOGICAL SURGERY

## 2023-03-15 PROCEDURE — 1159F MED LIST DOCD IN RCRD: CPT | Mod: CPTII,S$GLB,, | Performed by: NEUROLOGICAL SURGERY

## 2023-03-15 PROCEDURE — 99215 OFFICE O/P EST HI 40 MIN: CPT | Mod: S$GLB,,, | Performed by: NEUROLOGICAL SURGERY

## 2023-03-15 PROCEDURE — 99999 PR PBB SHADOW E&M-EST. PATIENT-LVL IV: CPT | Mod: PBBFAC,,, | Performed by: NEUROLOGICAL SURGERY

## 2023-03-15 PROCEDURE — 3008F BODY MASS INDEX DOCD: CPT | Mod: CPTII,S$GLB,, | Performed by: NEUROLOGICAL SURGERY

## 2023-03-15 PROCEDURE — 3074F SYST BP LT 130 MM HG: CPT | Mod: CPTII,S$GLB,, | Performed by: NEUROLOGICAL SURGERY

## 2023-03-15 PROCEDURE — 3078F DIAST BP <80 MM HG: CPT | Mod: CPTII,S$GLB,, | Performed by: NEUROLOGICAL SURGERY

## 2023-03-15 PROCEDURE — 3008F PR BODY MASS INDEX (BMI) DOCUMENTED: ICD-10-PCS | Mod: CPTII,S$GLB,, | Performed by: NEUROLOGICAL SURGERY

## 2023-03-15 PROCEDURE — 1159F PR MEDICATION LIST DOCUMENTED IN MEDICAL RECORD: ICD-10-PCS | Mod: CPTII,S$GLB,, | Performed by: NEUROLOGICAL SURGERY

## 2023-03-15 PROCEDURE — 3078F PR MOST RECENT DIASTOLIC BLOOD PRESSURE < 80 MM HG: ICD-10-PCS | Mod: CPTII,S$GLB,, | Performed by: NEUROLOGICAL SURGERY

## 2023-03-15 PROCEDURE — 3074F PR MOST RECENT SYSTOLIC BLOOD PRESSURE < 130 MM HG: ICD-10-PCS | Mod: CPTII,S$GLB,, | Performed by: NEUROLOGICAL SURGERY

## 2023-03-15 NOTE — PROGRESS NOTES
NEUROSURGICAL OUTPATIENT CONSULTATION NOTE    DATE OF SERVICE:  03/15/2023    ATTENDING PHYSICIAN:  Papito Dan MD    CONSULT REQUESTED BY:        REASON FOR CONSULT:  Back pain    HISTORY OF PRESENT ILLNESS:  This is a very pleasant 58 y.o. male who has a remote history of L3 through S1 fusion with pelvic instrumentation performed in [place].  Has chronic low back pain and mild claudication symptoms.  He can ambulate up to 800 m before feeling pain in his buttocks and fatigue in his legs.  I have counseled him multiple times previously about potential surgical interventions.  He has been successful in losing some weight and today wishes to maintain his current level of pain and prevent it from getting worse.  He notes that he has intermittent numbness in his bilateral feet which is not more than brief in duration occurring when he sits.  He did physical therapy a few years ago but has not done any since.  He follows with the pain doctor and is prescribed Norco and gabapentin.  He is trying to continue with a home exercise program of stretching exercises.  He has chronic history of L5 numbness on the left side which was present following his surgery and has been stable.    PAST MEDICAL HISTORY:  Active Ambulatory Problems     Diagnosis Date Noted    DDD (degenerative disc disease), cervical 01/13/2014    DDD (degenerative disc disease), lumbar 01/13/2014    Chronic lumbar radiculopathy 04/14/2014    Post laminectomy syndrome 05/22/2014    Lumbar facet arthropathy 05/22/2014    Myalgia and myositis 05/22/2014    Headache 01/13/2020    Glaucoma 01/13/2020    Skin cancer 01/13/2020    Urinary incontinence 01/13/2020    Gynecomastia 01/13/2020    Chronic cough 01/13/2020    Shortness of breath 01/13/2020    Cervical strain 01/13/2020    Lumbosacral strain 01/13/2020    Chronic pain syndrome 01/13/2020    Hypertension 01/13/2020    Hyperlipidemia 01/13/2020    Constipation 01/13/2020    Left lower quadrant abdominal pain  01/13/2020    Dizziness 01/13/2020    Atypical chest pain 01/13/2020    Anxiety 01/13/2020    Ventral hernia without obstruction or gangrene 01/13/2020    Depression 01/13/2020    Hypogonadism male 01/14/2020    Chronic renal impairment, stage 1 01/24/2020    Right nephrolithiasis 01/24/2020    Contact dermatitis 03/31/2020    Impacted cerumen of left ear 10/06/2020    Bright red blood per rectum 11/25/2020    History of lumbar fusion 11/25/2020    Spinal stenosis 12/09/2020    Bilateral nephrolithiasis 12/09/2020    Diverticulosis of colon 12/09/2020    Colonic polyp 01/04/2021    Diverticulosis 01/04/2021    Abnormal echocardiogram 09/02/2022    Elevated serum creatinine 10/03/2022    Low testosterone 11/28/2022    Right foot pain 02/14/2023    Obesity (BMI 35.0-39.9 without comorbidity) 02/14/2023     Resolved Ambulatory Problems     Diagnosis Date Noted    Obesity 01/13/2020    History of diverticulosis 01/24/2020    Morbid obesity with BMI of 40.0-44.9, adult 12/09/2020     Past Medical History:   Diagnosis Date    Cancer     Hyperlipemia     Vitamin D deficiency        PAST SURGICAL HISTORY:  Past Surgical History:   Procedure Laterality Date    BACK SURGERY      November 2008 in [place]    BASAL CELL CARCINOMA EXCISION      left side of lip/cheek    COLONOSCOPY N/A 01/04/2021    Procedure: COLONOSCOPY;  Surgeon: Eriberto Ladd MD;  Location: [facility] ENDO;  Service: Endoscopy;  Laterality: N/A;    COLONOSCOPY W/ POLYPECTOMY  01/04/2021    colonoscopy w/ polypectomy per  01/04/2021    CORONARY ANGIOGRAPHY  09/26/2022    CORONARY ANGIOGRAPHY Right 09/26/2022    Procedure: ANGIOGRAM, CORONARY ARTERY;  Surgeon: Anam Molina MD;  Location: [facility] CATH LAB;  Service: Cardiology;  Laterality: Right;    HERNIA REPAIR      LAMINECTOMY      PROSTATE BIOPSY      TONSILLECTOMY      1974       SOCIAL HISTORY:   Social History     Socioeconomic History    Marital status: Legally    Tobacco Use     Smoking status: Never    Smokeless tobacco: Never   Substance and Sexual Activity    Alcohol use: No    Drug use: No       FAMILY HISTORY:  Family History   Problem Relation Age of Onset    Suicide Father     Heart disease Maternal Grandmother     Heart disease Maternal Grandfather        CURRENTS MEDICATIONS:  Current Outpatient Medications on File Prior to Visit   Medication Sig Dispense Refill    albuterol (PROVENTIL/VENTOLIN HFA) 90 mcg/actuation inhaler Inhale 2 puffs into the lungs every 4 (four) hours as needed. Rescue 18 g 5    alfuzosin (UROXATRAL) 10 mg Tb24 Take 1 tablet (10 mg total) by mouth once daily. 90 tablet 1    atorvastatin (LIPITOR) 40 MG tablet Take 1 tablet (40 mg total) by mouth once daily. 90 tablet 3    cyclobenzaprine (FLEXERIL) 10 MG tablet Take 1 tablet (10 mg total) by mouth nightly as needed for Muscle spasms. 30 tablet 2    enalapril (VASOTEC) 10 MG tablet Take 1 tablet (10 mg total) by mouth once daily. 90 tablet 3    ergocalciferol (ERGOCALCIFEROL) 50,000 unit Cap Take 1 capsule by mouth once a week 12 capsule 3    fenofibrate (TRICOR) 145 MG tablet Take 1 tablet (145 mg total) by mouth once daily. 90 tablet 3    gabapentin (NEURONTIN) 800 MG tablet Take 1 tablet (800 mg total) by mouth 2 (two) times daily. 60 tablet 5    HYDROcodone-acetaminophen (NORCO)  mg per tablet Take 1 tablet by mouth every 6 (six) hours as needed.      isosorbide mononitrate (IMDUR) 30 MG 24 hr tablet Take 1 tablet (30 mg total) by mouth once daily. 30 tablet 11    latanoprost 0.005 % ophthalmic solution Place 1 drop into both eyes every evening.      meloxicam (MOBIC) 7.5 MG tablet Take 1 tablet (7.5 mg total) by mouth once daily. 60 tablet 5    metoprolol succinate (TOPROL-XL) 25 MG 24 hr tablet Take 1 tablet (25 mg total) by mouth once daily. 30 tablet 11    omega-3 acid ethyl esters (LOVAZA) 1 gram capsule Take 2 capsules (2 g total) by mouth 2 (two) times daily. 360 capsule 3    predniSONE  (DELTASONE) 20 MG tablet Take 1 tablet (20 mg total) by mouth once daily. 10 tablet 0    sertraline (ZOLOFT) 100 MG tablet Take 1 tablet (100 mg total) by mouth once daily. 90 tablet 1    diazePAM (VALIUM) 5 MG tablet Take 1 tablet (5 mg total) by mouth every 6 (six) hours as needed for Anxiety. (Patient not taking: Reported on 2/14/2023) 2 tablet 0     No current facility-administered medications on file prior to visit.       ALLERGIES:  Review of patient's allergies indicates:   Allergen Reactions    Fish containing products Hives     TUNA FISH      Pcn [penicillins] Itching       REVIEW OF SYSTEMS:  ROS - per HPI    OBJECTIVE:    PHYSICAL EXAMINATION:   Vitals:    03/15/23 1427   BP: 103/73   Pulse: 93       Neurologic Exam  His lumbar incision is well healed   Motor strength in lower extremities is 5/5 all motor groups   Left-sided chronic L5 dermatomal hypesthesia to light touch  Reflexes are 1+ and symmetric in the knees and ankles   No clonus    DIAGNOSTIC DATA:  I personally interpreted the following imaging:     I reviewed his recent MRI scan which shows central stenosis at L2-3, which appears stable or even possibly improved on this MRI compared to his previous    X-rays of his lumbar spine show potential sagittal imbalance and flat back syndrome    ASSESMENT:  This is a 58 y.o. male with     Problem List Items Addressed This Visit          Neuro    History of lumbar fusion - Primary     Other Visit Diagnoses       Spinal stenosis, lumbar region without neurogenic claudication        Sagittal plane imbalance        Relevant Orders    X-Ray Scoliosis Complete    [facility]            PLAN:  In order to maintain current pain control levels I have recommended that he works with are healthy back program.  He is continuing to attempt to lose weight.  He should continue his home exercise program as well.  I recommend he get scoliosis films to evaluate for any sagittal  imbalance.  I will contact him with results of these films.        Papito Dan MD, FAANS    Disclaimer: This note was partly generated using dictation software which may occasionally result in transcription errors.

## 2023-03-24 ENCOUNTER — TELEPHONE (OUTPATIENT)
Dept: UROLOGY | Facility: CLINIC | Age: 58
End: 2023-03-24
Payer: MEDICARE

## 2023-03-27 NOTE — TELEPHONE ENCOUNTER
Returned call to patient and scheduled him for an appt.    Xeljanz Counseling: I discussed with the patient the risks of Xeljanz therapy including increased risk of infection, liver issues, headache, diarrhea, or cold symptoms. Live vaccines should be avoided. They were instructed to call if they have any problems.

## 2023-04-26 ENCOUNTER — OFFICE VISIT (OUTPATIENT)
Dept: UROLOGY | Facility: CLINIC | Age: 58
End: 2023-04-26
Payer: MEDICARE

## 2023-04-26 VITALS
WEIGHT: 250.69 LBS | SYSTOLIC BLOOD PRESSURE: 131 MMHG | HEART RATE: 94 BPM | RESPIRATION RATE: 18 BRPM | BODY MASS INDEX: 37.13 KG/M2 | DIASTOLIC BLOOD PRESSURE: 84 MMHG | HEIGHT: 69 IN

## 2023-04-26 DIAGNOSIS — E29.1 HYPOGONADISM MALE: ICD-10-CM

## 2023-04-26 DIAGNOSIS — D35.2 HYPERPROLACTINOMA: Primary | ICD-10-CM

## 2023-04-26 PROCEDURE — 99999 PR PBB SHADOW E&M-EST. PATIENT-LVL V: ICD-10-PCS | Mod: PBBFAC,,, | Performed by: UROLOGY

## 2023-04-26 PROCEDURE — 1159F MED LIST DOCD IN RCRD: CPT | Mod: CPTII,S$GLB,, | Performed by: UROLOGY

## 2023-04-26 PROCEDURE — 1159F PR MEDICATION LIST DOCUMENTED IN MEDICAL RECORD: ICD-10-PCS | Mod: CPTII,S$GLB,, | Performed by: UROLOGY

## 2023-04-26 PROCEDURE — 99214 PR OFFICE/OUTPT VISIT, EST, LEVL IV, 30-39 MIN: ICD-10-PCS | Mod: S$GLB,,, | Performed by: UROLOGY

## 2023-04-26 PROCEDURE — 3075F SYST BP GE 130 - 139MM HG: CPT | Mod: CPTII,S$GLB,, | Performed by: UROLOGY

## 2023-04-26 PROCEDURE — 3075F PR MOST RECENT SYSTOLIC BLOOD PRESS GE 130-139MM HG: ICD-10-PCS | Mod: CPTII,S$GLB,, | Performed by: UROLOGY

## 2023-04-26 PROCEDURE — 1160F RVW MEDS BY RX/DR IN RCRD: CPT | Mod: CPTII,S$GLB,, | Performed by: UROLOGY

## 2023-04-26 PROCEDURE — 1160F PR REVIEW ALL MEDS BY PRESCRIBER/CLIN PHARMACIST DOCUMENTED: ICD-10-PCS | Mod: CPTII,S$GLB,, | Performed by: UROLOGY

## 2023-04-26 PROCEDURE — 3079F PR MOST RECENT DIASTOLIC BLOOD PRESSURE 80-89 MM HG: ICD-10-PCS | Mod: CPTII,S$GLB,, | Performed by: UROLOGY

## 2023-04-26 PROCEDURE — 99214 OFFICE O/P EST MOD 30 MIN: CPT | Mod: S$GLB,,, | Performed by: UROLOGY

## 2023-04-26 PROCEDURE — 3008F BODY MASS INDEX DOCD: CPT | Mod: CPTII,S$GLB,, | Performed by: UROLOGY

## 2023-04-26 PROCEDURE — 3008F PR BODY MASS INDEX (BMI) DOCUMENTED: ICD-10-PCS | Mod: CPTII,S$GLB,, | Performed by: UROLOGY

## 2023-04-26 PROCEDURE — 3079F DIAST BP 80-89 MM HG: CPT | Mod: CPTII,S$GLB,, | Performed by: UROLOGY

## 2023-04-26 PROCEDURE — 99999 PR PBB SHADOW E&M-EST. PATIENT-LVL V: CPT | Mod: PBBFAC,,, | Performed by: UROLOGY

## 2023-04-26 RX ORDER — DIAZEPAM 10 MG/1
10 TABLET ORAL ONCE
Qty: 1 TABLET | Refills: 0 | Status: SHIPPED | OUTPATIENT
Start: 2023-04-26 | End: 2024-02-06

## 2023-04-26 NOTE — PROGRESS NOTES
"Subjective:      Collin Almaguer Sr. is a 58 y.o. male who returns today regarding his low testosterone.    Here to review workup. No new c/o.    The following portions of the patient's history were reviewed and updated as appropriate: allergies, current medications, past family history, past medical history, past social history, past surgical history and problem list.    Review of Systems  A comprehensive multipoint review of systems was negative except as otherwise stated in the HPI.     Objective:   Vitals: /84 (BP Location: Right arm, Patient Position: Sitting, BP Method: Large (Automatic))   Pulse 94   Resp 18   Ht 5' 9" (1.753 m)   Wt 113.7 kg (250 lb 10.6 oz)   BMI 37.02 kg/m²     Physical Exam   General: alert and oriented, no acute distress  Respiratory: Symmetric expansion, non-labored breathing  Neuro: no gross deficits  Psych: normal judgment and insight, normal mood/affect, and non-anxious    Lab Review     Lab Results   Component Value Date    WBC 5.62 03/23/2023    HGB 13.5 (L) 03/23/2023    HCT 43.9 03/23/2023    MCV 89 03/23/2023     03/23/2023     Lab Results   Component Value Date    CREATININE 1.2 11/18/2022    BUN 20 11/18/2022     Lab Results   Component Value Date    PSA 0.49 03/23/2023     Component      Latest Ref Rng & Units 3/30/2023 3/23/2023   Testosterone      250 - 1100 ng/dL 246 (L) 190 (L)   Testosterone, Free      46.0 - 224.0 pg/mL 32.5 (L) 24.8 (L)   Testosterone, Bioavailable      110.0 - 575.0 ng/dL 71.0 (L) 57.5 (L)   Sex Hormone Binding Globulin      22 - 77 nmol/L 30 29   Albumin      3.6 - 5.1 g/dL 4.8 5.1   LH      0.6 - 12.1 mIU/mL  2.4   FSH      0.95 - 11.95 mIU/mL  6.89   Prolactin      3.5 - 19.4 ng/mL  48.3 (H)       Imaging   MRI Brain:   Subcentimeter heterogeneous hypoenhancement within the pituitary while nonspecific underlying pituitary adenoma to be considered in differential.  Clinical correlation further evaluation with dedicated sellar " imaging with and without contrast advised    Assessment and Plan:   1. Hyperprolactinoma  - MRI Brain Pituitary W WO Contrast; Future  - Ambulatory referral/consult to Endocrinology; Future    2. Hypogonadism male  - Defer treatment pending above

## 2023-05-05 ENCOUNTER — TELEPHONE (OUTPATIENT)
Dept: UROLOGY | Facility: CLINIC | Age: 58
End: 2023-05-05
Payer: MEDICARE

## 2023-05-05 DIAGNOSIS — D35.2 HYPERPROLACTINOMA: Primary | ICD-10-CM

## 2023-05-05 NOTE — TELEPHONE ENCOUNTER
----- Message from Davey Huston, RT sent at 5/4/2023  2:26 PM CDT -----  Regarding: STAT BMP Lab Order  Good afternoon, we have this patient scheduled for an MRI w/wo contrast tomorrow and since he takes medication for hypertension, we would need current labs. Would anyone be able to order a STAT BMP lab order for him? Thanks!

## 2023-05-05 NOTE — TELEPHONE ENCOUNTER
Called pt to see about getting him rescheduled for his MRI and blood work. Pt did not answer, left a detailed message.

## 2023-05-10 ENCOUNTER — OFFICE VISIT (OUTPATIENT)
Dept: PRIMARY CARE CLINIC | Facility: CLINIC | Age: 58
End: 2023-05-10
Payer: MEDICARE

## 2023-05-10 VITALS
OXYGEN SATURATION: 98 % | WEIGHT: 242.75 LBS | HEART RATE: 87 BPM | BODY MASS INDEX: 35.95 KG/M2 | RESPIRATION RATE: 18 BRPM | SYSTOLIC BLOOD PRESSURE: 122 MMHG | HEIGHT: 69 IN | DIASTOLIC BLOOD PRESSURE: 72 MMHG | TEMPERATURE: 96 F

## 2023-05-10 DIAGNOSIS — H10.31 ACUTE CONJUNCTIVITIS OF RIGHT EYE, UNSPECIFIED ACUTE CONJUNCTIVITIS TYPE: Primary | ICD-10-CM

## 2023-05-10 PROCEDURE — 3078F PR MOST RECENT DIASTOLIC BLOOD PRESSURE < 80 MM HG: ICD-10-PCS | Mod: CPTII,S$GLB,, | Performed by: FAMILY MEDICINE

## 2023-05-10 PROCEDURE — 99213 PR OFFICE/OUTPT VISIT, EST, LEVL III, 20-29 MIN: ICD-10-PCS | Mod: S$GLB,,, | Performed by: FAMILY MEDICINE

## 2023-05-10 PROCEDURE — 3008F BODY MASS INDEX DOCD: CPT | Mod: CPTII,S$GLB,, | Performed by: FAMILY MEDICINE

## 2023-05-10 PROCEDURE — 1159F PR MEDICATION LIST DOCUMENTED IN MEDICAL RECORD: ICD-10-PCS | Mod: CPTII,S$GLB,, | Performed by: FAMILY MEDICINE

## 2023-05-10 PROCEDURE — 99999 PR PBB SHADOW E&M-EST. PATIENT-LVL IV: CPT | Mod: PBBFAC,,, | Performed by: FAMILY MEDICINE

## 2023-05-10 PROCEDURE — 3008F PR BODY MASS INDEX (BMI) DOCUMENTED: ICD-10-PCS | Mod: CPTII,S$GLB,, | Performed by: FAMILY MEDICINE

## 2023-05-10 PROCEDURE — 3074F PR MOST RECENT SYSTOLIC BLOOD PRESSURE < 130 MM HG: ICD-10-PCS | Mod: CPTII,S$GLB,, | Performed by: FAMILY MEDICINE

## 2023-05-10 PROCEDURE — 1160F PR REVIEW ALL MEDS BY PRESCRIBER/CLIN PHARMACIST DOCUMENTED: ICD-10-PCS | Mod: CPTII,S$GLB,, | Performed by: FAMILY MEDICINE

## 2023-05-10 PROCEDURE — 1160F RVW MEDS BY RX/DR IN RCRD: CPT | Mod: CPTII,S$GLB,, | Performed by: FAMILY MEDICINE

## 2023-05-10 PROCEDURE — 99213 OFFICE O/P EST LOW 20 MIN: CPT | Mod: S$GLB,,, | Performed by: FAMILY MEDICINE

## 2023-05-10 PROCEDURE — 99999 PR PBB SHADOW E&M-EST. PATIENT-LVL IV: ICD-10-PCS | Mod: PBBFAC,,, | Performed by: FAMILY MEDICINE

## 2023-05-10 PROCEDURE — 1159F MED LIST DOCD IN RCRD: CPT | Mod: CPTII,S$GLB,, | Performed by: FAMILY MEDICINE

## 2023-05-10 PROCEDURE — 3074F SYST BP LT 130 MM HG: CPT | Mod: CPTII,S$GLB,, | Performed by: FAMILY MEDICINE

## 2023-05-10 PROCEDURE — 3078F DIAST BP <80 MM HG: CPT | Mod: CPTII,S$GLB,, | Performed by: FAMILY MEDICINE

## 2023-05-10 RX ORDER — GENTAMICIN SULFATE 3 MG/ML
2 SOLUTION/ DROPS OPHTHALMIC 4 TIMES DAILY
Qty: 5 ML | Refills: 0 | Status: SHIPPED | OUTPATIENT
Start: 2023-05-10 | End: 2023-05-17

## 2023-05-10 NOTE — PROGRESS NOTES
Clinic Note  5/10/2023      Subjective:       Patient ID:  Collin is a 58 y.o. male being seen for an established visit.    Chief Complaint: Eye Pain (Bilateral /Started on 5/9), Nasal Congestion (Started 5/6), and Fatigue    -cold symptoms started about 4 days ago  -grandson has pink eye  -eyes started hurting yesterday   -woke up this morning discharge  -feels pain and weakness in body  -pain on left side of nose, around mouth     -CAD, something going on with pituitary gland    Eye Pain   Both eyes are affected. This is a new problem. The current episode started yesterday. The problem has been gradually worsening. There is Known exposure to pink eye. He Does not wear contacts. Associated symptoms include blurred vision (right eye blurry last night), an eye discharge, eye redness, a foreign body sensation and itching. Pertinent negatives include no fever, nausea or vomiting. He has tried eye drops for the symptoms.   Fatigue  This is a new problem. Associated symptoms include congestion, coughing, fatigue, headaches, myalgias and a sore throat. Pertinent negatives include no chest pain (not currently), fever, nausea or vomiting.     Review of Systems   Constitutional:  Positive for fatigue. Negative for fever.   HENT:  Positive for congestion, ear pain, sinus pain and sore throat. Negative for ear discharge.    Eyes:  Positive for blurred vision (right eye blurry last night), pain, discharge, redness and itching.   Respiratory:  Positive for cough, sputum production and shortness of breath. Negative for wheezing.    Cardiovascular:  Negative for chest pain (not currently).   Gastrointestinal:  Negative for nausea and vomiting.   Musculoskeletal:  Positive for myalgias.   Neurological:  Positive for headaches.     Medication List with Changes/Refills   Current Medications    ALBUTEROL (PROVENTIL/VENTOLIN HFA) 90 MCG/ACTUATION INHALER    Inhale 2 puffs into the lungs every 4 (four) hours as needed. Rescue     ALFUZOSIN (UROXATRAL) 10 MG TB24    Take 1 tablet (10 mg total) by mouth once daily.    ATORVASTATIN (LIPITOR) 40 MG TABLET    Take 1 tablet (40 mg total) by mouth once daily.    CYCLOBENZAPRINE (FLEXERIL) 10 MG TABLET    Take 1 tablet (10 mg total) by mouth nightly as needed for Muscle spasms.    DIAZEPAM (VALIUM) 10 MG TAB    Take 1 tablet (10 mg total) by mouth once. for 1 dose    ERGOCALCIFEROL (ERGOCALCIFEROL) 50,000 UNIT CAP    Take 1 capsule by mouth once a week    FENOFIBRATE (TRICOR) 145 MG TABLET    Take 1 tablet (145 mg total) by mouth once daily.    GABAPENTIN (NEURONTIN) 800 MG TABLET    Take 1 tablet (800 mg total) by mouth 2 (two) times daily.    HYDROCODONE-ACETAMINOPHEN (NORCO)  MG PER TABLET    Take 1 tablet by mouth every 6 (six) hours as needed.    ISOSORBIDE MONONITRATE (IMDUR) 30 MG 24 HR TABLET    Take 1 tablet (30 mg total) by mouth once daily.    LATANOPROST 0.005 % OPHTHALMIC SOLUTION    Place 1 drop into both eyes every evening.    MELOXICAM (MOBIC) 7.5 MG TABLET    Take 1 tablet (7.5 mg total) by mouth once daily.    METOPROLOL SUCCINATE (TOPROL-XL) 25 MG 24 HR TABLET    Take 1 tablet (25 mg total) by mouth once daily.    OMEGA-3 ACID ETHYL ESTERS (LOVAZA) 1 GRAM CAPSULE    Take 2 capsules (2 g total) by mouth 2 (two) times daily.    SERTRALINE (ZOLOFT) 100 MG TABLET    Take 1 tablet (100 mg total) by mouth once daily.   Discontinued Medications    ENALAPRIL (VASOTEC) 10 MG TABLET    Take 1 tablet (10 mg total) by mouth once daily.    PREDNISONE (DELTASONE) 20 MG TABLET    Take 1 tablet (20 mg total) by mouth once daily.       Patient Active Problem List   Diagnosis    DDD (degenerative disc disease), cervical    DDD (degenerative disc disease), lumbar    Chronic lumbar radiculopathy    Post laminectomy syndrome    Lumbar facet arthropathy    Myalgia and myositis    Headache    Glaucoma    Skin cancer    Urinary incontinence    Gynecomastia    Chronic cough    Shortness of breath     "Cervical strain    Lumbosacral strain    Chronic pain syndrome    Hypertension    Hyperlipidemia    Constipation    Left lower quadrant abdominal pain    Dizziness    Atypical chest pain    Anxiety    Ventral hernia without obstruction or gangrene    Depression    Hypogonadism male    Chronic renal impairment, stage 1    Right nephrolithiasis    Contact dermatitis    Impacted cerumen of left ear    Bright red blood per rectum    History of lumbar fusion    Spinal stenosis    Bilateral nephrolithiasis    Diverticulosis of colon    Colonic polyp    Diverticulosis    Abnormal echocardiogram    Elevated serum creatinine    Low testosterone    Right foot pain    Obesity (BMI 35.0-39.9 without comorbidity)           Objective:      /72 (BP Location: Right arm, Patient Position: Sitting, BP Method: Medium (Manual))   Pulse 87   Temp 96.2 °F (35.7 °C) (Temporal)   Resp 18   Ht 5' 9" (1.753 m)   Wt 110.1 kg (242 lb 11.6 oz)   SpO2 98%   BMI 35.84 kg/m²   Estimated body mass index is 35.84 kg/m² as calculated from the following:    Height as of this encounter: 5' 9" (1.753 m).    Weight as of this encounter: 110.1 kg (242 lb 11.6 oz).    Physical Exam  Constitutional:       Appearance: Normal appearance.   HENT:      Right Ear: Tympanic membrane normal.      Left Ear: Tympanic membrane normal.      Nose: Congestion and rhinorrhea present.      Mouth/Throat:      Mouth: Mucous membranes are moist.      Pharynx: Oropharynx is clear.   Eyes:      General:         Right eye: Discharge present.         Left eye: No discharge.   Pulmonary:      Effort: Pulmonary effort is normal.   Musculoskeletal:      Cervical back: Normal range of motion. No rigidity or tenderness.   Lymphadenopathy:      Cervical: No cervical adenopathy.   Skin:     General: Skin is warm and dry.   Neurological:      General: No focal deficit present.      Mental Status: He is alert and oriented to person, place, and time.   Psychiatric:         " Mood and Affect: Mood normal.       Assessment and Plan:     Symptoms likely due to viral conjunctivitis.       Plan:   -symptom management  -nonantibiotic, lubricating drops  -decongestant/ anti-histamine drops (Naphazoline and pheniramine)  -warm & cold compresses       Problem List Items Addressed This Visit    None      Follow Up:   No follow-ups on file.    Other Orders Placed This Visit:  No orders of the defined types were placed in this encounter.        Windy Loza , MS4  -[employer]    Acute conjunctivitis of right eye, unspecified acute conjunctivitis type  -     gentamicin (GARAMYCIN) 0.3 % ophthalmic solution; Place 2 drops into the right eye 4 (four) times daily. for 7 days  Dispense: 5 mL; Refill: 0     I hereby acknowledge that I am relying upon documentation authored by a medical student working under my supervision and further I hereby attest that I have verified the student documentation or findings by personally re-performing the physical exam and medical decision making activities of the Evaluation and Management service to be billed.  Flavio Sena

## 2023-05-26 ENCOUNTER — HOSPITAL ENCOUNTER (OUTPATIENT)
Dept: RADIOLOGY | Facility: OTHER | Age: 58
Discharge: HOME | End: 2023-05-26
Attending: UROLOGY
Payer: MEDICARE

## 2023-05-26 DIAGNOSIS — D35.2 HYPERPROLACTINOMA: ICD-10-CM

## 2023-05-26 PROCEDURE — 70553 MRI BRAIN STEM W/O & W/DYE: CPT | Mod: TC

## 2023-05-26 PROCEDURE — A9585 GADOBUTROL INJECTION: HCPCS | Performed by: UROLOGY

## 2023-05-26 PROCEDURE — 25500020 PHARM REV CODE 255: Performed by: UROLOGY

## 2023-05-26 PROCEDURE — 70553 MRI BRAIN PITUITARY W W/O CONTRAST: ICD-10-PCS | Mod: 26,,, | Performed by: RADIOLOGY

## 2023-05-26 PROCEDURE — 70553 MRI BRAIN STEM W/O & W/DYE: CPT | Mod: 26,,, | Performed by: RADIOLOGY

## 2023-05-26 RX ORDER — GADOBUTROL 604.72 MG/ML
10 INJECTION INTRAVENOUS
Status: COMPLETED | OUTPATIENT
Start: 2023-05-26 | End: 2023-05-26

## 2023-05-26 RX ADMIN — GADOBUTROL 10 ML: 604.72 INJECTION INTRAVENOUS at 08:05

## 2023-05-31 ENCOUNTER — TELEPHONE (OUTPATIENT)
Dept: ENDOCRINOLOGY | Facility: CLINIC | Age: 58
End: 2023-05-31
Payer: MEDICARE

## 2023-05-31 ENCOUNTER — OFFICE VISIT (OUTPATIENT)
Dept: PRIMARY CARE CLINIC | Facility: CLINIC | Age: 58
End: 2023-05-31
Payer: MEDICARE

## 2023-05-31 VITALS
HEIGHT: 69 IN | BODY MASS INDEX: 37.29 KG/M2 | HEART RATE: 72 BPM | RESPIRATION RATE: 18 BRPM | WEIGHT: 251.75 LBS | SYSTOLIC BLOOD PRESSURE: 112 MMHG | DIASTOLIC BLOOD PRESSURE: 70 MMHG | OXYGEN SATURATION: 96 %

## 2023-05-31 DIAGNOSIS — M50.30 DDD (DEGENERATIVE DISC DISEASE), CERVICAL: ICD-10-CM

## 2023-05-31 DIAGNOSIS — L98.9 SKIN LESION: ICD-10-CM

## 2023-05-31 DIAGNOSIS — R79.89 LOW TESTOSTERONE: ICD-10-CM

## 2023-05-31 DIAGNOSIS — F41.9 ANXIETY: ICD-10-CM

## 2023-05-31 DIAGNOSIS — I10 HYPERTENSION, UNSPECIFIED TYPE: ICD-10-CM

## 2023-05-31 DIAGNOSIS — K57.90 DIVERTICULOSIS: ICD-10-CM

## 2023-05-31 DIAGNOSIS — G89.4 CHRONIC PAIN SYNDROME: ICD-10-CM

## 2023-05-31 DIAGNOSIS — E23.7 PITUITARY LESION: Primary | ICD-10-CM

## 2023-05-31 DIAGNOSIS — E78.00 PURE HYPERCHOLESTEROLEMIA: ICD-10-CM

## 2023-05-31 DIAGNOSIS — M51.36 DDD (DEGENERATIVE DISC DISEASE), LUMBAR: ICD-10-CM

## 2023-05-31 DIAGNOSIS — Z98.1 HISTORY OF LUMBAR FUSION: ICD-10-CM

## 2023-05-31 PROCEDURE — 3074F SYST BP LT 130 MM HG: CPT | Mod: CPTII,S$GLB,, | Performed by: FAMILY MEDICINE

## 2023-05-31 PROCEDURE — 3074F PR MOST RECENT SYSTOLIC BLOOD PRESSURE < 130 MM HG: ICD-10-PCS | Mod: CPTII,S$GLB,, | Performed by: FAMILY MEDICINE

## 2023-05-31 PROCEDURE — 1159F PR MEDICATION LIST DOCUMENTED IN MEDICAL RECORD: ICD-10-PCS | Mod: CPTII,S$GLB,, | Performed by: FAMILY MEDICINE

## 2023-05-31 PROCEDURE — 99999 PR PBB SHADOW E&M-EST. PATIENT-LVL V: ICD-10-PCS | Mod: PBBFAC,,, | Performed by: FAMILY MEDICINE

## 2023-05-31 PROCEDURE — 1159F MED LIST DOCD IN RCRD: CPT | Mod: CPTII,S$GLB,, | Performed by: FAMILY MEDICINE

## 2023-05-31 PROCEDURE — 3008F BODY MASS INDEX DOCD: CPT | Mod: CPTII,S$GLB,, | Performed by: FAMILY MEDICINE

## 2023-05-31 PROCEDURE — 3008F PR BODY MASS INDEX (BMI) DOCUMENTED: ICD-10-PCS | Mod: CPTII,S$GLB,, | Performed by: FAMILY MEDICINE

## 2023-05-31 PROCEDURE — 99999 PR PBB SHADOW E&M-EST. PATIENT-LVL V: CPT | Mod: PBBFAC,,, | Performed by: FAMILY MEDICINE

## 2023-05-31 PROCEDURE — 99214 OFFICE O/P EST MOD 30 MIN: CPT | Mod: S$GLB,,, | Performed by: FAMILY MEDICINE

## 2023-05-31 PROCEDURE — 3078F DIAST BP <80 MM HG: CPT | Mod: CPTII,S$GLB,, | Performed by: FAMILY MEDICINE

## 2023-05-31 PROCEDURE — 3078F PR MOST RECENT DIASTOLIC BLOOD PRESSURE < 80 MM HG: ICD-10-PCS | Mod: CPTII,S$GLB,, | Performed by: FAMILY MEDICINE

## 2023-05-31 PROCEDURE — 99214 PR OFFICE/OUTPT VISIT, EST, LEVL IV, 30-39 MIN: ICD-10-PCS | Mod: S$GLB,,, | Performed by: FAMILY MEDICINE

## 2023-05-31 RX ORDER — SERTRALINE HYDROCHLORIDE 100 MG/1
100 TABLET, FILM COATED ORAL DAILY
Qty: 90 TABLET | Refills: 1 | Status: SHIPPED | OUTPATIENT
Start: 2023-05-31 | End: 2023-11-28

## 2023-05-31 RX ORDER — FENOFIBRATE 145 MG/1
145 TABLET, FILM COATED ORAL DAILY
Qty: 90 TABLET | Refills: 1 | Status: SHIPPED | OUTPATIENT
Start: 2023-05-31 | End: 2024-02-06 | Stop reason: SDUPTHER

## 2023-05-31 NOTE — PROGRESS NOTES
Subjective:       Patient ID: Collin Almaguer Sr. is a 58 y.o. male.    Chief Complaint: 3 Month Check Up        HPI  58-year-old white male in for 3 month checkup  Right foot pain --better --comes and goes --about q 6 month --pain comes out of no where can feel fine 1 day in the next day has discomfort  MRI brain--7 mm right anterior pituitary lesion --pt with HA frontal area to the occipital area and in the neck is killing patient --last 4-6 months pain is mainly in the occipital area  Sees Dr Ely --had angiogram told had 100 % blockage and has collateral circulation   History hyperlipidemia on atorvastatin  History hypertension on Toprol  History of MI on Imdur  History depression on Zoloft Valium   history of bronchospasm on albuterol  Hx Dr Parks testosterone needs appt          ROS:   Skin: no psoriasis, eczema, skin cancer--Skin Cancer left cheek--Dr Wise-seen past not seen recently   HEENT: +occs  headache see HPI --pituitary tumor , ocular pain, blurred vision, diplopia, epistaxis, hoarseness change in voice, thyroid trouble +glaucoma itis bilaterally left greater than right  Lung: No pneumonia, +asthma as child ,no  Tb, wheezing, SOB, no smoking not sure if wheezes   Heart: no chest pain,no ankle edema, palpitations, MI, tiffanie murmur,+hypertension,+ hyperlipidemia--no stent bypass arrhythmia--never seen by cardiologist   Abdomen: +nausea, no  vomiting, diarrhea,+ constipation-a lot better no  ulcers, hepatitis, gallbladder disease, melena, hematochezia, hematemesis   : no UTI, renal disease, stones prostate  MS: no fractures, O/A, lupus, rheumatoid, gout--neck and back problem--old xray 2020 OK --laminectomy and rods hips --last fall patient gel over pile of debris in yd neighbor 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  Neuro: + Dizziness, no  LOC, seizures   No diabetes, no anemia, +anxiety, + depression on meds Disabled 2010 --being home all the time makes patient depressed--found father  is at home  2014  , 5 children, disability lives alone        Objective:   Physical Exam:  General: Well nourished, well developed, no acute distress + morbid obesity  Skin:  No lesion   HEENT: Eyes PERRLA, EOM intact, nose patent, throat non-erythematous ears TMs clear  NECK: Supple, no bruits, No JVD, no nodes   Lungs: Clear, no rales, rhonchi, wheezing  Heart: Regular rate and rhythm, no murmurs, gallops, or rubs  Abdomen: flat, bowel sounds positive, no tenderness, or organomegaly some slight ventral herniation--mild--some complaints of right costal angle swelling that comes and goes suggestive of hernia  Genitalia circumcised no hernia tests normal  Rectal exam negative   MS:-- tenderness lumbar spine anterior flexion 10° extension 10° lateral flexion rotation 10°--walks with wide-based gait--intermittent numbness left foot  Neuro: Alert, CN intact, oriented X 3 Romberg negative heel-toe slight swaying due back issues   Extremities: No cyanosis, clubbing, or edema         Assessment:       1. Pituitary lesion    2. Hypertension, unspecified type    3. Pure hypercholesterolemia    4. Anxiety    5. DDD (degenerative disc disease), lumbar    6. DDD (degenerative disc disease), cervical    7. Chronic pain syndrome    8. History of lumbar fusion    9. Low testosterone    10. Diverticulosis    11. Skin lesion            Plan:       Pituitary lesion  -     Ambulatory referral/consult to Endocrinology; Future; Expected date: 2023  -     Ambulatory referral/consult to Neurosurgery; Future; Expected date: 2023    Hypertension, unspecified type  -     CBC Auto Differential; Future; Expected date: 2023  -     Comprehensive Metabolic Panel; Future; Expected date: 2023    Pure hypercholesterolemia  -     Lipid Panel; Future; Expected date: 2023    Anxiety    DDD (degenerative disc disease), lumbar    DDD (degenerative disc disease), cervical    Chronic pain syndrome  -      Ambulatory referral/consult to Neurosurgery; Future; Expected date: 06/07/2023    History of lumbar fusion    Low testosterone    Diverticulosis  -     Testosterone; Future; Expected date: 05/31/2023  -     Testosterone, Total, Males; Future; Expected date: 05/31/2023    Skin lesion  -     Ambulatory referral/consult to Dermatology; Future; Expected date: 06/07/2023    Other orders  -     sertraline (ZOLOFT) 100 MG tablet; Take 1 tablet (100 mg total) by mouth once daily.  Dispense: 90 tablet; Refill: 1  -     fenofibrate (TRICOR) 145 MG tablet; Take 1 tablet (145 mg total) by mouth once daily.  Dispense: 90 tablet; Refill: 1            Multiple medical issues --    MRI the brain--due to headaches--7 mm anterior pituitary tumor--patient needs to see endocrinologist for additional workup  Numbness in the left foot--intermittent bilateral foot pain--needs to see Dr. Alatorre--Neuro surgery--history laminectomy chronic back pain   Low testosterone patient needs to see Urology  Skin lesion right side of the nose with increased vasculature see Dr. Wise for possible biopsy  MRI lumbar spine showed fusion L3-S1 with laminectomy/L1 to with some moderate to severe cord compression due to bulging disc and facet hyper  Chronic back pain--goes to chronic pain clinic---x-ray reviewed from 2020 of pelvic--shows lumbar fusion--with laminectomy--with rods going to both hips--patient states had recent x-ray had diagnostic imaging to be reviewed by Neuro surgery  Morbid obesity--exercise trying get down to ideal body weight--could consider gastric sleeve or bypass --patient was asking me for Adipex told I do not write it ecent   Multiple other medical issues not addressed this visit  Depression--needs to see a psychiatrist--multiple issues that need to be addressed should be on medication for depression--needs to see psychiatrist--if on SSRI such as Lexapro initially would be helpful with weight but in time but making gain  weight may benefit from Wellbutrin  Hyperlipidemia patient on Lipitor tri cor  Hypertension/hyperlipidemia Vasotec and vasoretic  for blood pressure on  Lipitor for cholesterol--needs to exercise as tolerated try to get to ideal body weight--Needs arm blood pressure cuff check blood pressure daily Needs blood pressure be 140/90 or less and greater than 90/60  Lab  CBCs CMP lipid free and total testosterone  Needs to see neurosurgeon to evaluate lower back  Health maintenance see she

## 2023-06-13 ENCOUNTER — OFFICE VISIT (OUTPATIENT)
Dept: NEUROSURGERY | Facility: CLINIC | Age: 58
End: 2023-06-13
Payer: MEDICARE

## 2023-06-13 VITALS
DIASTOLIC BLOOD PRESSURE: 86 MMHG | WEIGHT: 245.81 LBS | BODY MASS INDEX: 36.41 KG/M2 | SYSTOLIC BLOOD PRESSURE: 127 MMHG | HEIGHT: 69 IN | HEART RATE: 98 BPM

## 2023-06-13 DIAGNOSIS — R93.89 ABNORMAL FINDINGS ON DIAGNOSTIC IMAGING OF OTHER SPECIFIED BODY STRUCTURES: ICD-10-CM

## 2023-06-13 DIAGNOSIS — E23.7 PITUITARY LESION: ICD-10-CM

## 2023-06-13 DIAGNOSIS — G89.4 CHRONIC PAIN SYNDROME: ICD-10-CM

## 2023-06-13 PROCEDURE — 3079F DIAST BP 80-89 MM HG: CPT | Mod: CPTII,S$GLB,, | Performed by: NEUROLOGICAL SURGERY

## 2023-06-13 PROCEDURE — 1160F PR REVIEW ALL MEDS BY PRESCRIBER/CLIN PHARMACIST DOCUMENTED: ICD-10-PCS | Mod: CPTII,S$GLB,, | Performed by: NEUROLOGICAL SURGERY

## 2023-06-13 PROCEDURE — 3008F PR BODY MASS INDEX (BMI) DOCUMENTED: ICD-10-PCS | Mod: CPTII,S$GLB,, | Performed by: NEUROLOGICAL SURGERY

## 2023-06-13 PROCEDURE — 99214 PR OFFICE/OUTPT VISIT, EST, LEVL IV, 30-39 MIN: ICD-10-PCS | Mod: S$GLB,,, | Performed by: NEUROLOGICAL SURGERY

## 2023-06-13 PROCEDURE — 99214 OFFICE O/P EST MOD 30 MIN: CPT | Mod: S$GLB,,, | Performed by: NEUROLOGICAL SURGERY

## 2023-06-13 PROCEDURE — 1159F PR MEDICATION LIST DOCUMENTED IN MEDICAL RECORD: ICD-10-PCS | Mod: CPTII,S$GLB,, | Performed by: NEUROLOGICAL SURGERY

## 2023-06-13 PROCEDURE — 1159F MED LIST DOCD IN RCRD: CPT | Mod: CPTII,S$GLB,, | Performed by: NEUROLOGICAL SURGERY

## 2023-06-13 PROCEDURE — 1160F RVW MEDS BY RX/DR IN RCRD: CPT | Mod: CPTII,S$GLB,, | Performed by: NEUROLOGICAL SURGERY

## 2023-06-13 PROCEDURE — 99999 PR PBB SHADOW E&M-EST. PATIENT-LVL IV: CPT | Mod: PBBFAC,,, | Performed by: NEUROLOGICAL SURGERY

## 2023-06-13 PROCEDURE — 99999 PR PBB SHADOW E&M-EST. PATIENT-LVL IV: ICD-10-PCS | Mod: PBBFAC,,, | Performed by: NEUROLOGICAL SURGERY

## 2023-06-13 PROCEDURE — 3079F PR MOST RECENT DIASTOLIC BLOOD PRESSURE 80-89 MM HG: ICD-10-PCS | Mod: CPTII,S$GLB,, | Performed by: NEUROLOGICAL SURGERY

## 2023-06-13 PROCEDURE — 3074F SYST BP LT 130 MM HG: CPT | Mod: CPTII,S$GLB,, | Performed by: NEUROLOGICAL SURGERY

## 2023-06-13 PROCEDURE — 3008F BODY MASS INDEX DOCD: CPT | Mod: CPTII,S$GLB,, | Performed by: NEUROLOGICAL SURGERY

## 2023-06-13 PROCEDURE — 3074F PR MOST RECENT SYSTOLIC BLOOD PRESSURE < 130 MM HG: ICD-10-PCS | Mod: CPTII,S$GLB,, | Performed by: NEUROLOGICAL SURGERY

## 2023-06-13 NOTE — PATIENT INSTRUCTIONS
I have personally reviewed the MRI brain with the pt which shows 7 mm lesion right side of the pituitary gland may reflect a pituitary microadenoma if consistent clinically.  No encroachment on the optic chiasm identified or invasion of the cavernous sinus suspected.    Surgical intervention is not warranted at this time    Discussed the option of GSRS if conservative therapy fails    Pt will f/u with Dr. Robles, Endocrinology, next week for medical management

## 2023-06-13 NOTE — PROGRESS NOTES
Subjective:   I, Kaye Clay, attest that this documentation has been prepared under the direction and in the presence of Jerome Basurto MD.     Patient ID: Collin Almaguer Sr. is a 58 y.o. male     Chief Complaint: No chief complaint on file.      HPI  MrThomas Almaguer Sr. is a 58 y.o. gentleman with h/o HTN, HLD, and MI. referred to me by Dr. Millan, Family Medicine, who presents today to establish care. This is a patient who began to have unspecified episodes of dizziness that worsened with standing. He also reports significant fatigue and low energy levels after performing small activities. Pt was also noted to have low testosterone levels and attributed his fatigue to this. He was found to have a 7 mm right anterior pituitary lesion on MRI brain.       Review of Systems   Constitutional:  Positive for fatigue. Negative for activity change, appetite change, fever and unexpected weight change.   HENT:  Negative for facial swelling.    Eyes: Negative.    Respiratory: Negative.     Cardiovascular: Negative.    Gastrointestinal:  Negative for diarrhea, nausea and vomiting.   Endocrine: Negative.    Genitourinary: Negative.    Musculoskeletal:  Negative for back pain, joint swelling, myalgias and neck pain.   Neurological:  Positive for dizziness. Negative for seizures, weakness, numbness and headaches.   Psychiatric/Behavioral: Negative.        Past Medical History:   Diagnosis Date    Cancer     Had a tumor removed in the back    Depression     Hyperlipemia     Hypertension     Low testosterone     Vitamin D deficiency        Objective:      Vitals:    06/13/23 1107   BP: 127/86   Pulse: 98      Physical Exam  Constitutional:       General: He is not in acute distress.     Appearance: Normal appearance.   HENT:      Head: Normocephalic and atraumatic.   Pulmonary:      Effort: Pulmonary effort is normal.   Musculoskeletal:      Cervical back: Neck supple.   Neurological:      Mental Status: He is alert  and oriented to person, place, and time.      GCS: GCS eye subscore is 4. GCS verbal subscore is 5. GCS motor subscore is 6.      Cranial Nerves: No cranial nerve deficit.          IMAGING:  MRI Brain W WO Contrast (5/26/2023):  7 mm lesion right side of the pituitary gland may reflect a pituitary microadenoma if consistent clinically.  No encroachment on the optic chiasm identified or invasion of the cavernous sinus suspected.    I have personally reviewed the images with the pt.      I, Dr. Jerome Basurto, personally performed the services described in this documentation. All medical record entries made by the scribe, Kaye Clay, were at my direction and in my presence.  I have reviewed the chart and agree that the record reflects my personal performance and is accurate and complete. Jerome Basurto MD. 06/13/2023    Assessment:       Pituitary microadenoma.   Elevated prolactin  Hypogonadism.     Plan:   I have personally reviewed the MRI brain with the pt which shows 7 mm lesion right side of the pituitary gland may reflect a pituitary microadenoma if consistent clinically.  No encroachment on the optic chiasm identified or invasion of the cavernous sinus suspected.    Surgical intervention is not warranted at this time    Discussed the option of GSRS if conservative therapy fails    Pt will f/u with Dr. Robles, Endocrinology, next week for medical management

## 2023-06-18 ENCOUNTER — PATIENT MESSAGE (OUTPATIENT)
Dept: NEUROSURGERY | Facility: CLINIC | Age: 58
End: 2023-06-18
Payer: MEDICARE

## 2023-06-19 ENCOUNTER — PATIENT MESSAGE (OUTPATIENT)
Dept: NEUROSURGERY | Facility: CLINIC | Age: 58
End: 2023-06-19
Payer: MEDICARE

## 2023-06-20 ENCOUNTER — OFFICE VISIT (OUTPATIENT)
Dept: ENDOCRINOLOGY | Facility: CLINIC | Age: 58
End: 2023-06-20
Payer: MEDICARE

## 2023-06-20 VITALS
HEART RATE: 77 BPM | DIASTOLIC BLOOD PRESSURE: 83 MMHG | HEIGHT: 69 IN | BODY MASS INDEX: 36.8 KG/M2 | WEIGHT: 248.44 LBS | SYSTOLIC BLOOD PRESSURE: 115 MMHG

## 2023-06-20 DIAGNOSIS — N62 GYNECOMASTIA: ICD-10-CM

## 2023-06-20 DIAGNOSIS — E29.1 HYPOGONADISM MALE: ICD-10-CM

## 2023-06-20 DIAGNOSIS — R79.89 ELEVATED PROLACTIN LEVEL: ICD-10-CM

## 2023-06-20 DIAGNOSIS — E23.7 PITUITARY LESION: ICD-10-CM

## 2023-06-20 DIAGNOSIS — R79.89 LOW TESTOSTERONE: Primary | ICD-10-CM

## 2023-06-20 DIAGNOSIS — E23.7 DISORDER OF PITUITARY GLAND, UNSPECIFIED: ICD-10-CM

## 2023-06-20 PROCEDURE — 1159F PR MEDICATION LIST DOCUMENTED IN MEDICAL RECORD: ICD-10-PCS | Mod: CPTII,S$GLB,, | Performed by: INTERNAL MEDICINE

## 2023-06-20 PROCEDURE — 99204 PR OFFICE/OUTPT VISIT, NEW, LEVL IV, 45-59 MIN: ICD-10-PCS | Mod: S$GLB,,, | Performed by: INTERNAL MEDICINE

## 2023-06-20 PROCEDURE — 3079F DIAST BP 80-89 MM HG: CPT | Mod: CPTII,S$GLB,, | Performed by: INTERNAL MEDICINE

## 2023-06-20 PROCEDURE — 3008F PR BODY MASS INDEX (BMI) DOCUMENTED: ICD-10-PCS | Mod: CPTII,S$GLB,, | Performed by: INTERNAL MEDICINE

## 2023-06-20 PROCEDURE — 99204 OFFICE O/P NEW MOD 45 MIN: CPT | Mod: S$GLB,,, | Performed by: INTERNAL MEDICINE

## 2023-06-20 PROCEDURE — 3074F SYST BP LT 130 MM HG: CPT | Mod: CPTII,S$GLB,, | Performed by: INTERNAL MEDICINE

## 2023-06-20 PROCEDURE — 99999 PR PBB SHADOW E&M-EST. PATIENT-LVL IV: CPT | Mod: PBBFAC,,, | Performed by: INTERNAL MEDICINE

## 2023-06-20 PROCEDURE — 3008F BODY MASS INDEX DOCD: CPT | Mod: CPTII,S$GLB,, | Performed by: INTERNAL MEDICINE

## 2023-06-20 PROCEDURE — 3079F PR MOST RECENT DIASTOLIC BLOOD PRESSURE 80-89 MM HG: ICD-10-PCS | Mod: CPTII,S$GLB,, | Performed by: INTERNAL MEDICINE

## 2023-06-20 PROCEDURE — 99999 PR PBB SHADOW E&M-EST. PATIENT-LVL IV: ICD-10-PCS | Mod: PBBFAC,,, | Performed by: INTERNAL MEDICINE

## 2023-06-20 PROCEDURE — 3074F PR MOST RECENT SYSTOLIC BLOOD PRESSURE < 130 MM HG: ICD-10-PCS | Mod: CPTII,S$GLB,, | Performed by: INTERNAL MEDICINE

## 2023-06-20 PROCEDURE — 1159F MED LIST DOCD IN RCRD: CPT | Mod: CPTII,S$GLB,, | Performed by: INTERNAL MEDICINE

## 2023-06-20 RX ORDER — DEXAMETHASONE 1 MG/1
1 TABLET ORAL ONCE
Qty: 1 TABLET | Refills: 0 | Status: SHIPPED | OUTPATIENT
Start: 2023-06-20 | End: 2023-06-20

## 2023-06-20 RX ORDER — CABERGOLINE 0.5 MG/1
0.25 TABLET ORAL
Qty: 8 TABLET | Refills: 2 | Status: SHIPPED | OUTPATIENT
Start: 2023-06-22 | End: 2023-08-21

## 2023-06-20 NOTE — PROGRESS NOTES
[facility] INITIAL VISIT  06/20/2023       Subjective:      Reason for referal: referred by Jaret Armendariz MD for evaluation and management of pituitary lesion     HPI:   Collin Almaguer is a 58 y.o. male with hx of low testosterone, hypertension, hyperlipidemia, depression, chronic back pain, CKD and other medical problems who presents for evaluation of pituitary lesion (with elevated prolactin and low testosterone)     He was also seen by Dr. Basurto in neurosurgery earlier this month were no surgical intervention was recommended and he was recommended endocrine follow-up.      Initial presentation:   MRI done for headache with incidental finding of pituitary lesion.      Imaging:      Pituitary MRI 5/26/23  There is a lesion identified within the anterior portion of the pituitary gland to the right of midline measuring 7 mm in transverse dimension consistent with a micro adenoma if clinically consistent.  No encroachment on the optic chiasm is identified.  No invasion of the cavernous sinus is suggested    Headache:    + chronic HA    Vision change:   denies    Formal Visual fields:   HVF not done (no significant suprasellar extension)    Pituitary labs with elevated prolactin, mildly low testosterone, otherwise normal.  Has not had dexamethasone supression test (DST)   Latest Reference Range & Units 03/30/23 09:36 06/14/23 08:06   Cortisol, 8 AM 4.30 - 22.40 ug/dL  7.50   ACTH 0 - 46 pg/mL  19   Somatomedin (IGF-I) 34 - 232 ng/mL  115   TSH 0.400 - 4.000 uIU/mL  1.212   Free T4 0.71 - 1.51 ng/dL  1.02   FSH 0.95 - 11.95 mIU/mL  6.77   LH 0.6 - 12.1 mIU/mL  2.9   Prolactin 3.5 - 19.4 ng/mL  46.5 (H)   Sex Hormone Binding Globulin 22 - 77 nmol/L 30    Testosterone 250 - 1100 ng/dL 246 (L)    Testosterone, Bioavailable 110.0 - 575.0 ng/dL 71.0 (L)    Testosterone, Free 46.0 - 224.0 pg/mL 32.5 (L)    Testosterone, Total 304 - 1227 ng/dL  238 (L)       Current  symptoms:  Hyperprolactinemia:  [x]  breast tenderness []  nipple discharge  []  Denies   Hx of gynecomastia  Lab Results   Component Value Date    PROLACTIN 46.5 (H) 06/14/2023    PROLACTIN 48.3 (H) 03/23/2023     Thyroid:    Lab Results   Component Value Date    TSH 1.212 06/14/2023    FREET4 1.02 06/14/2023          Growth Hormone Excess:    Last IGF-1:   Lab Results   Component Value Date    SOMATMDN 115 06/14/2023      Cushing's syndrome:   []  Easy bruising []  Weight gain  []  Worse glycemic control   [x]  HTN  []  Acne  []  Hirsutism  []  Striae  []  Menstrual change []  VTE   []  Fractures  []  Denies All    Lab Results   Component Value Date    HGBA1C 5.3 01/13/2020      Gonadotrophs:  Symptoms of low testosterone for several year.  Was on testosterone injections but stopped for polycythemia, off four a couple year.     []  Irregular menses []  Postmenopausal  [x]  Decreased libido   [x]  ED   []  Denies    Lab Results   Component Value Date    HCT 43.9 03/23/2023    HCT 35.7 (L) 09/23/2022    HCT 40.6 09/08/2022    TOTALTESTOST 238 (L) 06/14/2023    TOTALTESTOST 1161 05/01/2020    TOTALTESTOST 112 (L) 01/13/2020    TESTOSTERONE 246 (L) 03/30/2023    TESTOSTERONE 32.5 (L) 03/30/2023    PSA 0.49 03/23/2023    PSA 0.98 05/01/2020    HDL 9 (L) 09/08/2022    HDL 25 (L) 12/16/2020    TRIG 492 (H) 09/08/2022    TRIG 327 (H) 12/16/2020    ALT 49 (H) 11/18/2022    ALT 29 10/03/2022    ALT 26 09/23/2022    AST 31 11/18/2022    AST 26 10/03/2022    AST 20 09/23/2022           Current Outpatient Medications:     albuterol (PROVENTIL/VENTOLIN HFA) 90 mcg/actuation inhaler, Inhale 2 puffs into the lungs every 4 (four) hours as needed. Rescue, Disp: 18 g, Rfl: 5    alfuzosin (UROXATRAL) 10 mg Tb24, Take 1 tablet (10 mg total) by mouth once daily., Disp: 90 tablet, Rfl: 1    atorvastatin (LIPITOR) 40 MG tablet, Take 1 tablet (40 mg total) by mouth once daily., Disp: 90 tablet, Rfl: 3    cyclobenzaprine (FLEXERIL) 10 MG  tablet, Take 1 tablet (10 mg total) by mouth nightly as needed for Muscle spasms., Disp: 30 tablet, Rfl: 2    diazePAM (VALIUM) 10 MG Tab, Take 1 tablet (10 mg total) by mouth once. for 1 dose, Disp: 1 tablet, Rfl: 0    ergocalciferol (ERGOCALCIFEROL) 50,000 unit Cap, Take 1 capsule by mouth once a week, Disp: 12 capsule, Rfl: 3    fenofibrate (TRICOR) 145 MG tablet, Take 1 tablet (145 mg total) by mouth once daily., Disp: 90 tablet, Rfl: 1    gabapentin (NEURONTIN) 800 MG tablet, Take 1 tablet (800 mg total) by mouth 2 (two) times daily., Disp: 60 tablet, Rfl: 5    HYDROcodone-acetaminophen (NORCO)  mg per tablet, Take 1 tablet by mouth every 6 (six) hours as needed., Disp: , Rfl:     isosorbide mononitrate (IMDUR) 30 MG 24 hr tablet, Take 1 tablet (30 mg total) by mouth once daily., Disp: 30 tablet, Rfl: 11    latanoprost 0.005 % ophthalmic solution, Place 1 drop into both eyes every evening., Disp: , Rfl:     meloxicam (MOBIC) 7.5 MG tablet, Take 1 tablet (7.5 mg total) by mouth once daily., Disp: 60 tablet, Rfl: 5    metoprolol succinate (TOPROL-XL) 25 MG 24 hr tablet, Take 1 tablet (25 mg total) by mouth once daily., Disp: 30 tablet, Rfl: 11    omega-3 acid ethyl esters (LOVAZA) 1 gram capsule, Take 2 capsules (2 g total) by mouth 2 (two) times daily., Disp: 360 capsule, Rfl: 3    sertraline (ZOLOFT) 100 MG tablet, Take 1 tablet (100 mg total) by mouth once daily., Disp: 90 tablet, Rfl: 1  ROS:  see HPI    Objective:   Physical Exam   There were no vitals taken for this visit.  Wt Readings from Last 3 Encounters:   06/13/23 111.5 kg (245 lb 13 oz)   05/31/23 114.2 kg (251 lb 12.3 oz)   05/10/23 110.1 kg (242 lb 11.6 oz)   ]    Constitutional:  Pleasant,  in no acute distress.   HENT:   Eyes:     No scleral icterus.   Respiratory:   Effort normal   Neurological:  normal speech  Psych:  Normal mood and affect.      LABORATORY REVIEW:    Chemistry        Component Value Date/Time     05/25/2023 5403     K 4.0 05/25/2023 1307     05/25/2023 1307    CO2 28 05/25/2023 1307    BUN 25 (H) 05/25/2023 1307    CREATININE 1.2 05/25/2023 1307    CREATININE 1.2 05/25/2023 1307    GLU 90 05/25/2023 1307        Component Value Date/Time    CALCIUM 9.8 05/25/2023 1307    ALKPHOS 60 11/18/2022 1217    AST 31 11/18/2022 1217    ALT 49 (H) 11/18/2022 1217    BILITOT 0.4 11/18/2022 1217    ESTGFRAFRICA 48.6 (A) 12/16/2020 1214    EGFRNONAA 42.0 (A) 12/16/2020 1214        Lab Results   Component Value Date    CALCIUM 9.8 05/25/2023    ALBUMIN 4.8 03/30/2023    ESTGFRAFRICA 48.6 (A) 12/16/2020    EGFRNONAA 42.0 (A) 12/16/2020    ALKPHOS 60 11/18/2022    IYIEJNLI87YM 27 (L) 12/16/2020    TSH 1.212 06/14/2023        Pituitary MRI 5/26/23  There is a lesion identified within the anterior portion of the pituitary gland to the right of midline measuring 7 mm in transverse dimension consistent with a micro adenoma if clinically consistent.  No encroachment on the optic chiasm is identified.  No invasion of the cavernous sinus is suggested        Assessment/Plan:     Problem List Items Addressed This Visit          1 - High    Pituitary lesion      I have independently reviewed the pituitary MRI from 05/26/2023 which shows a subcentimeter hypoenhancing lesion within the pituitary gland with no significant suprasellar extension *.  The pituitary stalk is midline.  no impingement upon the optic apparatus.       Discussed that this small lesion would not be causing dizziness or headaches.      Based on labs with mildly elevated prolactin and low testosterone this may be either a prolactinoma verses a nonfunctional adenoma that is causing a very small amount of stalk effect (on imaging no clear impingement of the pituitary stalk or displacement).      Will try cabergoline 0.25 mg twice weekly to see if normalizing prolactin result in decrease in the size of the lesion or improvement in testosterone levels.      Will check prolactin in 2  months to ensure normalization on medical management followed by six-month visit with MRI, prolactin, 8:00 a.m. fasting testosterone prior.         Relevant Medications    dexAMETHasone (DECADRON) 1 MG Tab    Other Relevant Orders    Cortisol, 8AM    Dexamethasone       2     Hypogonadism male     Recommend avoiding testosterone replacement this time as low testosterone may be at least in part due to hyperprolactinemia which we are treating.  Low testosterone may also be partially due to obesity so may not fully normalize but will need to wait at months to re-evaluate on cabergoline.            3     Gynecomastia     Likely due to hyperprolactinemia, will continue to monitor on cabergoline.            Unprioritized    Elevated prolactin level    Relevant Medications    cabergoline (DOSTINEX) 0.5 mg tablet (Start on 6/22/2023)    Other Relevant Orders    Prolactin    Prolactin    Low testosterone - Primary    Relevant Orders    Testosterone Panel     Other Visit Diagnoses       Disorder of pituitary gland, unspecified        Relevant Orders    MRI Pituitary W W/O Contrast            Return to clinic in 6 months virtual with MRI and 8 am fasting lab prior for prolactin and testosterone 2 wks prior.   Prolactin in 2 month(s)  8 am fasting cortisol, dexamethasone this week    Hazel Robles MD

## 2023-06-20 NOTE — PATIENT INSTRUCTIONS
dexamethasone suppression test:  Please take 1 mg tablet of dexamethasone at it exactly 11:00 p.m. the night before your 8:00 a.m. blood test is scheduled.  Have blood drawn exactly at 8:00 a.m. as the timings of the medication and blood draw are very important.    I have sent a prescription for 1 mg of dexamethasone (one dose) to your pharmacy.  A normal response to taking the dexamethasone is a low cortisol level the next day. Do not be alarmed if your cortisol level is marked as abnormally low because that is a normal response.       HIGH PROLACTIN:    Start cabergoline 0.25 mg (1/2 tablet) twice a week.  It is best if you take it at bedtime with a small healthy snack so you can sleep through any possible side effects.  I recommend using a pill box for this to make sure you are not missing any doses.  If later in the week you notice that you have missed a pill you can always make it up by taking it on a different day.  The important thing is that you take the same amount each week.      Sometimes cabergoline can cause a runny nose, dizziness, and upset stomach but it is usually not a problem if you take it at night with some food.  Please let me know if you have any bad side effects or if you are having trouble getting the medication.      At much higher doses this medication has been used to treat other conditions like Parkinson's disease and at those high doses there have been some reports of behavior changes like excessive gambling or shopping.  We almost never see that happen with the small doses we use to treat high prolactin but if you notice any of these changes please let me know right away.       We will recheck 8 am fasting labs with a repeat MRI and visit in 6 months.

## 2023-06-20 NOTE — ASSESSMENT & PLAN NOTE
Recommend avoiding testosterone replacement this time as low testosterone may be at least in part due to hyperprolactinemia which we are treating.  Low testosterone may also be partially due to obesity so may not fully normalize but will need to wait at months to re-evaluate on cabergoline.

## 2023-06-20 NOTE — ASSESSMENT & PLAN NOTE
I have independently reviewed the pituitary MRI from 05/26/2023 which shows a subcentimeter hypoenhancing lesion within the pituitary gland with no significant suprasellar extension *.  The pituitary stalk is midline.  no impingement upon the optic apparatus.       Discussed that this small lesion would not be causing dizziness or headaches.      Based on labs with mildly elevated prolactin and low testosterone this may be either a prolactinoma verses a nonfunctional adenoma that is causing a very small amount of stalk effect (on imaging no clear impingement of the pituitary stalk or displacement).      Will try cabergoline 0.25 mg twice weekly to see if normalizing prolactin result in decrease in the size of the lesion or improvement in testosterone levels.      Will check prolactin in 2 months to ensure normalization on medical management followed by six-month visit with MRI, prolactin, 8:00 a.m. fasting testosterone prior.

## 2023-07-25 RX ORDER — METOPROLOL SUCCINATE 25 MG/1
TABLET, EXTENDED RELEASE ORAL
Qty: 90 TABLET | Refills: 3 | Status: SHIPPED | OUTPATIENT
Start: 2023-07-25 | End: 2024-02-06 | Stop reason: SDUPTHER

## 2023-07-25 NOTE — TELEPHONE ENCOUNTER
Provider Staff:  Action required for this patient     Please see care gap opportunities below in Care Due Message: Lipid panel due 9/6/23    Thanks!  [facility]     Appointments      Date Provider   Last Visit   5/31/2023 Jaret Armendariz MD   Next Visit   12/4/2023 Jaret Armendariz MD     Refill Decision Note   Collin Almaguer  is requesting a refill authorization.  Brief Assessment and Rationale for Refill:  Approve     Medication Therapy Plan:         Comments:     Note composed:5:10 PM 07/25/2023

## 2023-07-25 NOTE — TELEPHONE ENCOUNTER
Care Due:                  Date            Visit Type   Department     Provider  --------------------------------------------------------------------------------                                EP -                              PRIMARY [facility]  Last Visit: 05-      CARE ([facility])   PRIMARY CARE   Jaret Armendariz                              EP - PRIMARY [facility]  Next Visit: 12-      CARE ([facility])   PRIMARY CARE   Jaret Armendariz                                                            Last  Test          Frequency    Reason                     Performed    Due Date  --------------------------------------------------------------------------------    Lipid Panel.  12 months..  atorvastatin,              09- 09-                             fenofibrate, omega-3.....    [employer] Embedded Care Due Messages. Reference number: [id number].   7/25/2023 4:20:22 PM CDT

## 2023-08-18 PROBLEM — R79.89 ELEVATED LIVER FUNCTION TESTS: Status: ACTIVE | Noted: 2023-08-18

## 2023-08-21 DIAGNOSIS — R79.89 ELEVATED PROLACTIN LEVEL: ICD-10-CM

## 2023-08-21 RX ORDER — CABERGOLINE 0.5 MG/1
0.25 TABLET ORAL
Qty: 18 TABLET | Refills: 2 | Status: SHIPPED | OUTPATIENT
Start: 2023-08-21 | End: 2023-08-23 | Stop reason: SDUPTHER

## 2023-08-22 ENCOUNTER — PATIENT MESSAGE (OUTPATIENT)
Dept: ENDOCRINOLOGY | Facility: CLINIC | Age: 58
End: 2023-08-22
Payer: MEDICARE

## 2023-08-22 DIAGNOSIS — R79.89 ELEVATED PROLACTIN LEVEL: ICD-10-CM

## 2023-08-23 RX ORDER — CABERGOLINE 0.5 MG/1
0.25 TABLET ORAL
Qty: 18 TABLET | Refills: 2 | Status: SHIPPED | OUTPATIENT
Start: 2023-08-23 | End: 2024-01-30 | Stop reason: SDUPTHER

## 2023-08-24 RX ORDER — ERGOCALCIFEROL 1.25 MG/1
CAPSULE ORAL
Qty: 12 CAPSULE | Refills: 3 | Status: SHIPPED | OUTPATIENT
Start: 2023-08-24 | End: 2024-02-06 | Stop reason: SDUPTHER

## 2023-09-07 ENCOUNTER — PATIENT MESSAGE (OUTPATIENT)
Dept: ENDOCRINOLOGY | Facility: CLINIC | Age: 58
End: 2023-09-07
Payer: MEDICARE

## 2023-09-07 DIAGNOSIS — E23.7 DISORDER OF PITUITARY GLAND, UNSPECIFIED: Primary | ICD-10-CM

## 2023-09-07 RX ORDER — LORAZEPAM 1 MG/1
1 TABLET ORAL
Qty: 2 TABLET | Refills: 0 | Status: SHIPPED | OUTPATIENT
Start: 2023-09-07 | End: 2024-02-06

## 2023-09-18 ENCOUNTER — PATIENT MESSAGE (OUTPATIENT)
Dept: PRIMARY CARE CLINIC | Facility: CLINIC | Age: 58
End: 2023-09-18
Payer: MEDICARE

## 2023-09-26 RX ORDER — ALFUZOSIN HYDROCHLORIDE 10 MG/1
10 TABLET, EXTENDED RELEASE ORAL
Qty: 90 TABLET | Refills: 2 | Status: SHIPPED | OUTPATIENT
Start: 2023-09-26 | End: 2024-02-06 | Stop reason: SDUPTHER

## 2023-09-26 NOTE — TELEPHONE ENCOUNTER
No care due was identified.  [facility] Embedded Care Due Messages. Reference number: [id number].   9/26/2023 10:07:29 AM CDT

## 2023-09-26 NOTE — TELEPHONE ENCOUNTER
Refill Decision Note   Collin Tripp  is requesting a refill authorization.  Brief Assessment and Rationale for Refill:  Approve     Medication Therapy Plan:         Comments:     Note composed:1:25 PM 09/26/2023

## 2023-10-14 DIAGNOSIS — I25.10 CORONARY ARTERY DISEASE INVOLVING NATIVE CORONARY ARTERY OF NATIVE HEART, UNSPECIFIED WHETHER ANGINA PRESENT: ICD-10-CM

## 2023-10-14 DIAGNOSIS — E78.5 HYPERLIPIDEMIA, UNSPECIFIED HYPERLIPIDEMIA TYPE: ICD-10-CM

## 2023-10-14 NOTE — TELEPHONE ENCOUNTER
No care due was identified.  [facility] Embedded Care Due Messages. Reference number: [id number].   10/14/2023 3:44:16 PM CDT

## 2023-10-16 RX ORDER — ATORVASTATIN CALCIUM 40 MG/1
40 TABLET, FILM COATED ORAL
Qty: 90 TABLET | Refills: 1 | Status: SHIPPED | OUTPATIENT
Start: 2023-10-16 | End: 2024-02-06 | Stop reason: SDUPTHER

## 2023-10-16 NOTE — TELEPHONE ENCOUNTER
Refill Routing Note   Medication(s) are not appropriate for processing by [facility] for the following reason(s):      No active prescription written by provider    [facility] action(s):  Defer Care Due:  None identified            Appointments  past 12m or future 3m with PCP    Date Provider   Last Visit   5/31/2023 Jaret Armendariz MD   Next Visit   12/4/2023 Jaret Armendariz MD   ED visits in past 90 days: 0        Note composed:3:57 AM 10/16/2023

## 2023-10-18 ENCOUNTER — PATIENT MESSAGE (OUTPATIENT)
Dept: CARDIOLOGY | Facility: CLINIC | Age: 58
End: 2023-10-18
Payer: MEDICARE

## 2023-11-07 DIAGNOSIS — I25.10 CORONARY ARTERY DISEASE INVOLVING NATIVE CORONARY ARTERY OF NATIVE HEART, UNSPECIFIED WHETHER ANGINA PRESENT: ICD-10-CM

## 2023-11-07 DIAGNOSIS — E78.5 HYPERLIPIDEMIA, UNSPECIFIED HYPERLIPIDEMIA TYPE: ICD-10-CM

## 2023-11-07 RX ORDER — OMEGA-3-ACID ETHYL ESTERS 1 G/1
2 CAPSULE, LIQUID FILLED ORAL 2 TIMES DAILY
Qty: 360 CAPSULE | Refills: 1 | Status: SHIPPED | OUTPATIENT
Start: 2023-11-07

## 2023-11-07 NOTE — TELEPHONE ENCOUNTER
Refill Routing Note   Medication(s) are not appropriate for processing by [facility] for the following reason(s):      No active prescription written by provider    [facility] action(s):  Defer Care Due:  None identified              Appointments  past 12m or future 3m with PCP    Date Provider   Last Visit   5/31/2023 Jaret Armendariz MD   Next Visit   12/4/2023 Jaret Armendariz MD   ED visits in past 90 days: 0        Note composed:5:33 PM 11/07/2023

## 2023-11-07 NOTE — TELEPHONE ENCOUNTER
No care due was identified.  [employer] Embedded Care Due Messages. Reference number: [id number].   11/07/2023 9:14:03 AM CST

## 2023-11-08 ENCOUNTER — PATIENT MESSAGE (OUTPATIENT)
Dept: PRIMARY CARE CLINIC | Facility: CLINIC | Age: 58
End: 2023-11-08
Payer: MEDICARE

## 2023-11-16 ENCOUNTER — PATIENT MESSAGE (OUTPATIENT)
Dept: ENDOCRINOLOGY | Facility: CLINIC | Age: 58
End: 2023-11-16
Payer: MEDICARE

## 2023-11-22 ENCOUNTER — HOSPITAL ENCOUNTER (OUTPATIENT)
Dept: RADIOLOGY | Facility: HOSPITAL | Age: 58
Discharge: HOME | End: 2023-11-22
Attending: INTERNAL MEDICINE
Payer: MEDICARE

## 2023-11-22 DIAGNOSIS — E23.7 DISORDER OF PITUITARY GLAND, UNSPECIFIED: ICD-10-CM

## 2023-11-22 PROCEDURE — 70553 MRI BRAIN STEM W/O & W/DYE: CPT | Mod: TC

## 2023-11-22 PROCEDURE — A9585 GADOBUTROL INJECTION: HCPCS | Performed by: INTERNAL MEDICINE

## 2023-11-22 PROCEDURE — 70553 MRI BRAIN STEM W/O & W/DYE: CPT | Mod: 26,,, | Performed by: RADIOLOGY

## 2023-11-22 PROCEDURE — 70553 MRI PITUITARY W W/O CONTRAST: ICD-10-PCS | Mod: 26,,, | Performed by: RADIOLOGY

## 2023-11-22 PROCEDURE — 25500020 PHARM REV CODE 255: Performed by: INTERNAL MEDICINE

## 2023-11-22 RX ORDER — GADOBUTROL 604.72 MG/ML
5 INJECTION INTRAVENOUS
Status: COMPLETED | OUTPATIENT
Start: 2023-11-22 | End: 2023-11-22

## 2023-11-22 RX ADMIN — GADOBUTROL 5 ML: 604.72 INJECTION INTRAVENOUS at 01:11

## 2023-11-28 RX ORDER — SERTRALINE HYDROCHLORIDE 100 MG/1
100 TABLET, FILM COATED ORAL DAILY
Qty: 90 TABLET | Refills: 1 | Status: SHIPPED | OUTPATIENT
Start: 2023-11-28 | End: 2024-02-06 | Stop reason: SDUPTHER

## 2023-11-28 NOTE — TELEPHONE ENCOUNTER
No care due was identified.  [facility] Embedded Care Due Messages. Reference number: [id number].   11/28/2023 12:59:48 PM CST

## 2023-11-28 NOTE — TELEPHONE ENCOUNTER
Refill Decision Note   Collin Tripp  is requesting a refill authorization.  Brief Assessment and Rationale for Refill:  Approve     Medication Therapy Plan:         Comments:     Note composed:5:24 PM 11/28/2023

## 2023-12-01 ENCOUNTER — TELEPHONE (OUTPATIENT)
Dept: ENDOCRINOLOGY | Facility: CLINIC | Age: 58
End: 2023-12-01
Payer: MEDICARE

## 2023-12-01 ENCOUNTER — PATIENT MESSAGE (OUTPATIENT)
Dept: CARDIOLOGY | Facility: CLINIC | Age: 58
End: 2023-12-01
Payer: MEDICARE

## 2023-12-01 RX ORDER — ISOSORBIDE MONONITRATE 30 MG/1
30 TABLET, EXTENDED RELEASE ORAL
Qty: 90 TABLET | Refills: 0 | OUTPATIENT
Start: 2023-12-01

## 2023-12-01 RX ORDER — ISOSORBIDE MONONITRATE 30 MG/1
30 TABLET, EXTENDED RELEASE ORAL DAILY
Qty: 30 TABLET | Refills: 11 | Status: SHIPPED | OUTPATIENT
Start: 2023-12-01 | End: 2024-01-09

## 2023-12-01 NOTE — TELEPHONE ENCOUNTER
----- Message from Orquidea Calles sent at 12/1/2023  2:37 PM CST -----  Regarding: return call  Contact: Collin Ortiz:Collin        Returning call to: Amara Ortiz can be reached @: [phone] (home)

## 2023-12-01 NOTE — TELEPHONE ENCOUNTER
No answer. Message left to call clinic back. Pt will need follow up appointment scheduled.   Last office visit 10/03/2022.

## 2023-12-01 NOTE — TELEPHONE ENCOUNTER
Spoke with pt. Pt notified of refill and appt time. All questions answered. Pt verbalized understanding.

## 2023-12-01 NOTE — TELEPHONE ENCOUNTER
Left voice message for patient he is rescheduled for Dec 8th with Dr Robles no answer called 3 times

## 2023-12-04 ENCOUNTER — TELEPHONE (OUTPATIENT)
Dept: ENDOCRINOLOGY | Facility: CLINIC | Age: 58
End: 2023-12-04
Payer: MEDICARE

## 2024-01-09 ENCOUNTER — OFFICE VISIT (OUTPATIENT)
Dept: CARDIOLOGY | Facility: CLINIC | Age: 59
End: 2024-01-09
Payer: MEDICARE

## 2024-01-09 VITALS
WEIGHT: 249.69 LBS | SYSTOLIC BLOOD PRESSURE: 133 MMHG | HEART RATE: 88 BPM | BODY MASS INDEX: 36.87 KG/M2 | DIASTOLIC BLOOD PRESSURE: 82 MMHG | OXYGEN SATURATION: 95 %

## 2024-01-09 DIAGNOSIS — R93.1 ABNORMAL ECHOCARDIOGRAM: ICD-10-CM

## 2024-01-09 DIAGNOSIS — I50.20 HFREF (HEART FAILURE WITH REDUCED EJECTION FRACTION): ICD-10-CM

## 2024-01-09 DIAGNOSIS — I10 HYPERTENSION, UNSPECIFIED TYPE: ICD-10-CM

## 2024-01-09 DIAGNOSIS — R40.0 DAYTIME SOMNOLENCE: Primary | ICD-10-CM

## 2024-01-09 DIAGNOSIS — I70.0 AORTIC ATHEROSCLEROSIS: ICD-10-CM

## 2024-01-09 DIAGNOSIS — R07.9 CHEST PAIN, UNSPECIFIED TYPE: ICD-10-CM

## 2024-01-09 PROCEDURE — 99999 PR PBB SHADOW E&M-EST. PATIENT-LVL IV: CPT | Mod: PBBFAC,HCNC,, | Performed by: NURSE PRACTITIONER

## 2024-01-09 PROCEDURE — 3079F DIAST BP 80-89 MM HG: CPT | Mod: HCNC,CPTII,S$GLB, | Performed by: NURSE PRACTITIONER

## 2024-01-09 PROCEDURE — 3075F SYST BP GE 130 - 139MM HG: CPT | Mod: HCNC,CPTII,S$GLB, | Performed by: NURSE PRACTITIONER

## 2024-01-09 PROCEDURE — 93005 ELECTROCARDIOGRAM TRACING: CPT | Mod: HCNC,S$GLB,, | Performed by: NURSE PRACTITIONER

## 2024-01-09 PROCEDURE — 93010 ELECTROCARDIOGRAM REPORT: CPT | Mod: HCNC,S$GLB,, | Performed by: INTERNAL MEDICINE

## 2024-01-09 PROCEDURE — 1159F MED LIST DOCD IN RCRD: CPT | Mod: HCNC,CPTII,S$GLB, | Performed by: NURSE PRACTITIONER

## 2024-01-09 PROCEDURE — 1160F RVW MEDS BY RX/DR IN RCRD: CPT | Mod: HCNC,CPTII,S$GLB, | Performed by: NURSE PRACTITIONER

## 2024-01-09 PROCEDURE — 3008F BODY MASS INDEX DOCD: CPT | Mod: HCNC,CPTII,S$GLB, | Performed by: NURSE PRACTITIONER

## 2024-01-09 PROCEDURE — 99214 OFFICE O/P EST MOD 30 MIN: CPT | Mod: HCNC,S$GLB,, | Performed by: NURSE PRACTITIONER

## 2024-01-09 RX ORDER — ISOSORBIDE MONONITRATE 30 MG/1
60 TABLET, EXTENDED RELEASE ORAL DAILY
Qty: 60 TABLET | Refills: 11 | Status: SHIPPED | OUTPATIENT
Start: 2024-01-09 | End: 2024-02-06 | Stop reason: SDUPTHER

## 2024-01-09 RX ORDER — OXYCODONE AND ACETAMINOPHEN 10; 325 MG/1; MG/1
1 TABLET ORAL 4 TIMES DAILY PRN
COMMUNITY
Start: 2024-01-05 | End: 2024-02-06

## 2024-01-09 NOTE — PATIENT INSTRUCTIONS
Dr. Armendariz ordered labwork to check your blood chemistry and your cholesterol    I would recommend taking the Omega 3 fatty acids that Dr. Armendariz prescribed    I would recommend getting a repeat echocardiogram to look at your heart function    I would recommend starting some sort of exercise program- even a few minutes of walking can build your exercise tolerance.    I would also recommend setting up with the Digital Hypertension program    We are increasing your isosorbide dosing to see if this helps the chest pain    I am referring you to sleep medicine- you need a sleep study as I am concerned that you have sleep apnea

## 2024-01-09 NOTE — PROGRESS NOTES
Cardiology    1/9/2024  2:12 PM    Problem list  Patient Active Problem List   Diagnosis    DDD (degenerative disc disease), cervical    DDD (degenerative disc disease), lumbar    Chronic lumbar radiculopathy    Post laminectomy syndrome    Lumbar facet arthropathy    Myalgia and myositis    Headache    Glaucoma    Skin cancer    Urinary incontinence    Gynecomastia    Chronic cough    Shortness of breath    Cervical strain    Lumbosacral strain    Chronic pain syndrome    Hypertension    Hyperlipidemia    Constipation    Left lower quadrant abdominal pain    Dizziness    Atypical chest pain    Anxiety    Ventral hernia without obstruction or gangrene    Depression    Hypogonadism male    Chronic renal impairment, stage 1    Right nephrolithiasis    Contact dermatitis    Impacted cerumen of left ear    Bright red blood per rectum    History of lumbar fusion    Spinal stenosis    Bilateral nephrolithiasis    Diverticulosis of colon    Colonic polyp    Diverticulosis    Abnormal echocardiogram    Elevated serum creatinine    Low testosterone    Right foot pain    Obesity (BMI 35.0-39.9 without comorbidity)    Pituitary lesion    Elevated prolactin level    Elevated liver function tests       CC:  Chest pain    HPI:  Has chest pain occasionally that does not happen daily- he associates it with stress.  Has poor energy.  Sleeping is tough- he can't fall asleep and also doesn't stay asleep.  Was due to see pulmonary/sleep medicine and did not make it as he feels sick some days and just has no energy.   He takes his Sertraline and takes 50 in the Am and 50 in the PM. He may feel a bit more calm. He can be sitting up watching TV and will nod off easily. Has headaches upon awakening but is unsure if this is due to neck problems. Sleeps on 2 pillows which is average for him- this is due to musculoskeletal issues.    Medications  Current Outpatient Medications   Medication Sig Dispense Refill    albuterol  (PROVENTIL/VENTOLIN HFA) 90 mcg/actuation inhaler Inhale 2 puffs into the lungs every 4 (four) hours as needed. Rescue 18 g 5    alfuzosin (UROXATRAL) 10 mg Tb24 Take 1 tablet by mouth once daily 90 tablet 2    atorvastatin (LIPITOR) 40 MG tablet Take 1 tablet by mouth once daily 90 tablet 1    cabergoline (DOSTINEX) 0.5 mg tablet Take 0.5 tablets (0.25 mg total) by mouth 3 (three) times a week. 18 tablet 2    cyclobenzaprine (FLEXERIL) 10 MG tablet Take 1 tablet (10 mg total) by mouth nightly as needed for Muscle spasms. 30 tablet 2    ergocalciferol (ERGOCALCIFEROL) 50,000 unit Cap Take 1 capsule by mouth once a week 12 capsule 3    fenofibrate (TRICOR) 145 MG tablet Take 1 tablet (145 mg total) by mouth once daily. 90 tablet 1    gabapentin (NEURONTIN) 800 MG tablet Take 1 tablet (800 mg total) by mouth 2 (two) times daily. 60 tablet 5    isosorbide mononitrate (IMDUR) 30 MG 24 hr tablet Take 1 tablet (30 mg total) by mouth once daily. 30 tablet 11    latanoprost 0.005 % ophthalmic solution Place 1 drop into both eyes every evening.      meloxicam (MOBIC) 7.5 MG tablet Take 1 tablet (7.5 mg total) by mouth once daily. 60 tablet 5    metoprolol succinate (TOPROL-XL) 25 MG 24 hr tablet Take 1 tablet by mouth once daily 90 tablet 3    oxyCODONE-acetaminophen (PERCOCET)  mg per tablet Take 1 tablet by mouth 4 (four) times daily as needed.      sertraline (ZOLOFT) 100 MG tablet Take 1 tablet (100 mg total) by mouth once daily. 90 tablet 1    diazePAM (VALIUM) 10 MG Tab Take 1 tablet (10 mg total) by mouth once. for 1 dose 1 tablet 0    HYDROcodone-acetaminophen (NORCO)  mg per tablet Take 1 tablet by mouth every 6 (six) hours as needed.      LORazepam (ATIVAN) 1 MG tablet Take 1 tablet (1 mg total) by mouth as needed for Anxiety (for MRI). Take one tablet 30 min before MRI, if needed addition tablet right before MRI.  Do not drive or operate dangerous machinery for a minimum of 12 hours after 2 tablet 0     omega-3 acid ethyl esters (LOVAZA) 1 gram capsule Take 2 capsules by mouth twice daily (Patient not taking: Reported on 1/9/2024) 360 capsule 1     No current facility-administered medications for this visit.      Prior to Admission medications    Medication Sig Start Date End Date Taking? Authorizing Provider   albuterol (PROVENTIL/VENTOLIN HFA) 90 mcg/actuation inhaler Inhale 2 puffs into the lungs every 4 (four) hours as needed. Rescue 2/14/23  Yes Jaret Armendariz MD   alfuzosin (UROXATRAL) 10 mg Tb24 Take 1 tablet by mouth once daily 9/26/23  Yes Jaret Armendariz MD   atorvastatin (LIPITOR) 40 MG tablet Take 1 tablet by mouth once daily 10/16/23  Yes Jaret Armendariz MD   cabergoline (DOSTINEX) 0.5 mg tablet Take 0.5 tablets (0.25 mg total) by mouth 3 (three) times a week. 8/23/23 8/22/24 Yes Hazel Robles MD   cyclobenzaprine (FLEXERIL) 10 MG tablet Take 1 tablet (10 mg total) by mouth nightly as needed for Muscle spasms. 2/14/23  Yes Jaret Armendariz MD   ergocalciferol (ERGOCALCIFEROL) 50,000 unit Cap Take 1 capsule by mouth once a week 8/24/23  Yes Jaret Armendariz MD   fenofibrate (TRICOR) 145 MG tablet Take 1 tablet (145 mg total) by mouth once daily. 5/31/23  Yes Jaret Armendariz MD   gabapentin (NEURONTIN) 800 MG tablet Take 1 tablet (800 mg total) by mouth 2 (two) times daily. 7/2/22  Yes Jaret Armendariz MD   isosorbide mononitrate (IMDUR) 30 MG 24 hr tablet Take 1 tablet (30 mg total) by mouth once daily. 12/1/23 11/30/24 Yes Catalina Mohan DNP   latanoprost 0.005 % ophthalmic solution Place 1 drop into both eyes every evening. 12/4/19  Yes Provider, Historical   meloxicam (MOBIC) 7.5 MG tablet Take 1 tablet (7.5 mg total) by mouth once daily. 2/14/23  Yes Jaret Armendariz MD   metoprolol succinate (TOPROL-XL) 25 MG 24 hr tablet Take 1 tablet by mouth once daily 7/25/23  Yes Jaret Armendariz MD   oxyCODONE-acetaminophen (PERCOCET)  mg per tablet Take 1 tablet  by mouth 4 (four) times daily as needed. 1/5/24  Yes Provider, Historical   sertraline (ZOLOFT) 100 MG tablet Take 1 tablet (100 mg total) by mouth once daily. 11/28/23  Yes Jaret Armendariz MD   diazePAM (VALIUM) 10 MG Tab Take 1 tablet (10 mg total) by mouth once. for 1 dose 4/26/23 5/31/23  Cameron Galvez MD   HYDROcodone-acetaminophen (NORCO)  mg per tablet Take 1 tablet by mouth every 6 (six) hours as needed. 1/2/20   Provider, Historical   LORazepam (ATIVAN) 1 MG tablet Take 1 tablet (1 mg total) by mouth as needed for Anxiety (for MRI). Take one tablet 30 min before MRI, if needed addition tablet right before MRI.  Do not drive or operate dangerous machinery for a minimum of 12 hours after 9/7/23 10/7/23  Hazel Robles MD   omega-3 acid ethyl esters (LOVAZA) 1 gram capsule Take 2 capsules by mouth twice daily  Patient not taking: Reported on 1/9/2024 11/7/23   Jaret Armendariz MD         History  Past Medical History:   Diagnosis Date    Cancer     Had a tumor removed in the back    Depression     Hyperlipemia     Hypertension     Low testosterone     Vitamin D deficiency      Past Surgical History:   Procedure Laterality Date    BACK SURGERY      November 2008 in [place]    BASAL CELL CARCINOMA EXCISION      left side of lip/cheek    COLONOSCOPY N/A 01/04/2021    Procedure: COLONOSCOPY;  Surgeon: Eriberto Ladd MD;  Location: [facility] ENDO;  Service: Endoscopy;  Laterality: N/A;    COLONOSCOPY W/ POLYPECTOMY  01/04/2021    colonoscopy w/ polypectomy per  01/04/2021    CORONARY ANGIOGRAPHY  09/26/2022    CORONARY ANGIOGRAPHY Right 09/26/2022    Procedure: ANGIOGRAM, CORONARY ARTERY;  Surgeon: Anam Molina MD;  Location: [facility] CATH LAB;  Service: Cardiology;  Laterality: Right;    HERNIA REPAIR      LAMINECTOMY      PROSTATE BIOPSY      TONSILLECTOMY      1974     Social History     Socioeconomic History    Marital status: Legally    Tobacco Use    Smoking  status: Never    Smokeless tobacco: Never   Substance and Sexual Activity    Alcohol use: No    Drug use: No         Allergies  Review of patient's allergies indicates:   Allergen Reactions    Fish containing products Hives     TUNA FISH      Pcn [penicillins] Itching         Review of Systems   Review of Systems   Constitutional: Negative for diaphoresis and malaise/fatigue.   HENT: Negative.     Cardiovascular:  Positive for chest pain (rare, resolves quickly and not as as bad compared to our initial visit). Negative for claudication, dyspnea on exertion, irregular heartbeat, leg swelling, near-syncope, orthopnea, palpitations, paroxysmal nocturnal dyspnea and syncope.   Respiratory:  Negative for shortness of breath.    Endocrine: Negative for polydipsia, polyphagia and polyuria.   Hematologic/Lymphatic: Does not bruise/bleed easily.   Gastrointestinal:  Negative for bloating, nausea and vomiting.   Genitourinary: Negative.    Neurological:  Negative for excessive daytime sleepiness, dizziness, light-headedness, loss of balance and weakness.   Psychiatric/Behavioral:  The patient is not nervous/anxious.    Allergic/Immunologic: Negative.          Physical Exam  Wt Readings from Last 1 Encounters:   01/09/24 113.3 kg (249 lb 10.7 oz)     BP Readings from Last 3 Encounters:   01/09/24 133/82   06/20/23 115/83   06/13/23 127/86     Pulse Readings from Last 1 Encounters:   01/09/24 88     Body mass index is 36.87 kg/m².    Physical Exam  Vitals and nursing note reviewed.   Constitutional:       Appearance: Normal appearance.      Comments: BMI 36   HENT:      Head: Normocephalic and atraumatic.      Mouth/Throat:      Mouth: Mucous membranes are moist.   Eyes:      Pupils: Pupils are equal, round, and reactive to light.   Cardiovascular:      Rate and Rhythm: Normal rate and regular rhythm.      Pulses:           Radial pulses are 2+ on the right side and 2+ on the left side.        Dorsalis pedis pulses are 2+ on  the right side and 2+ on the left side.        Posterior tibial pulses are 2+ on the right side and 2+ on the left side.      Heart sounds: No murmur heard.  Pulmonary:      Effort: Pulmonary effort is normal. No respiratory distress.      Breath sounds: Normal breath sounds.   Abdominal:      General: There is no distension.      Tenderness: There is no abdominal tenderness.   Musculoskeletal:      Cervical back: Normal range of motion.      Right lower leg: No edema.      Left lower leg: No edema.   Skin:     General: Skin is warm and dry.      Findings: No erythema.   Neurological:      General: No focal deficit present.      Mental Status: He is alert.   Psychiatric:         Mood and Affect: Mood normal.         Behavior: Behavior normal.           Problem List Items Addressed This Visit          Cardiac/Vascular    Hypertension    Overview     At goal.   Continue current meds         Abnormal echocardiogram    Overview       Depressed EF 40%  Significant wall motion abnormalities  Repeat echo           Current Assessment & Plan     As above         Aortic atherosclerosis     Other Visit Diagnoses       Daytime somnolence    -  Primary    Relevant Orders    Ambulatory referral/consult to Sleep Disorders    Chest pain, unspecified type        Relevant Orders    IN OFFICE EKG 12-LEAD (to [place]) (Completed)    Echo    HFrEF (heart failure with reduced ejection fraction)            Increase Imdur   Needs to see sleep medicine             @Catalina Mohan DNP

## 2024-01-11 DIAGNOSIS — Z00.00 ENCOUNTER FOR MEDICARE ANNUAL WELLNESS EXAM: ICD-10-CM

## 2024-01-15 ENCOUNTER — PATIENT MESSAGE (OUTPATIENT)
Dept: CARDIOLOGY | Facility: CLINIC | Age: 59
End: 2024-01-15
Payer: MEDICARE

## 2024-01-15 PROBLEM — I70.0 AORTIC ATHEROSCLEROSIS: Status: ACTIVE | Noted: 2024-01-15

## 2024-01-18 ENCOUNTER — PATIENT MESSAGE (OUTPATIENT)
Dept: UROLOGY | Facility: CLINIC | Age: 59
End: 2024-01-18
Payer: MEDICARE

## 2024-01-30 ENCOUNTER — OFFICE VISIT (OUTPATIENT)
Dept: ENDOCRINOLOGY | Facility: CLINIC | Age: 59
End: 2024-01-30
Payer: MEDICARE

## 2024-01-30 VITALS
HEART RATE: 81 BPM | SYSTOLIC BLOOD PRESSURE: 116 MMHG | WEIGHT: 254.19 LBS | HEIGHT: 69 IN | BODY MASS INDEX: 37.65 KG/M2 | DIASTOLIC BLOOD PRESSURE: 73 MMHG

## 2024-01-30 DIAGNOSIS — R79.89 ELEVATED PROLACTIN LEVEL: ICD-10-CM

## 2024-01-30 DIAGNOSIS — E23.7 PITUITARY LESION: ICD-10-CM

## 2024-01-30 DIAGNOSIS — E29.1 HYPOGONADISM MALE: Primary | ICD-10-CM

## 2024-01-30 DIAGNOSIS — E23.7 DISORDER OF PITUITARY GLAND, UNSPECIFIED: ICD-10-CM

## 2024-01-30 PROCEDURE — 99999 PR PBB SHADOW E&M-EST. PATIENT-LVL III: CPT | Mod: PBBFAC,HCNC,, | Performed by: INTERNAL MEDICINE

## 2024-01-30 PROCEDURE — 3008F BODY MASS INDEX DOCD: CPT | Mod: HCNC,CPTII,S$GLB, | Performed by: INTERNAL MEDICINE

## 2024-01-30 PROCEDURE — 99214 OFFICE O/P EST MOD 30 MIN: CPT | Mod: HCNC,S$GLB,, | Performed by: INTERNAL MEDICINE

## 2024-01-30 PROCEDURE — G2211 COMPLEX E/M VISIT ADD ON: HCPCS | Mod: HCNC,S$GLB,, | Performed by: INTERNAL MEDICINE

## 2024-01-30 PROCEDURE — 3074F SYST BP LT 130 MM HG: CPT | Mod: HCNC,CPTII,S$GLB, | Performed by: INTERNAL MEDICINE

## 2024-01-30 PROCEDURE — 1159F MED LIST DOCD IN RCRD: CPT | Mod: HCNC,CPTII,S$GLB, | Performed by: INTERNAL MEDICINE

## 2024-01-30 PROCEDURE — 3078F DIAST BP <80 MM HG: CPT | Mod: HCNC,CPTII,S$GLB, | Performed by: INTERNAL MEDICINE

## 2024-01-30 RX ORDER — CABERGOLINE 0.5 MG/1
0.25 TABLET ORAL
Qty: 18 TABLET | Refills: 2 | Status: SHIPPED | OUTPATIENT
Start: 2024-01-31 | End: 2025-01-30

## 2024-01-30 NOTE — ASSESSMENT & PLAN NOTE
Prolactin now normal on cabergoline, recheck MRI in 12 month(s) to look for decrease in size  of tumor

## 2024-01-30 NOTE — PATIENT INSTRUCTIONS
Please call to schedule your sleep evaluation because sleep apnea can cause low testosterone if not treated.     Continue cabergoline 1/2 tablet three times a week.

## 2024-01-30 NOTE — PROGRESS NOTES
[facility]   01/30/2024     The patient's last visit with me was on 6/20/2023.     Subjective:      Reason for referal: follow up prolactinoma and low testosterone     HPI:   Collin lAmaguer is a 59 y.o. male with hx of low testosterone, hypertension, hyperlipidemia, depression, chronic back pain, CKD and other medical problems who presents for evaluation of pituitary lesion (with elevated prolactin and low testosterone)     He was also seen by Dr. Basurto in neurosurgery earlier this month were no surgical intervention was recommended and he was recommended endocrine follow-up.      Initial presentation:   MRI done for headache with incidental finding of pituitary lesion.      Interval hx:  Started on cabergoline 6/2023 with normalization of levels  Now on cabergoline 0.25 mg three times a week.  Using pill box, no missed doses  Denies side effects from medication     Did not have testosterone re-checked.     Imaging:    Pituitary MRI 11/2023  Stable 7 mm lesion in the right side of the pituitary gland.  No encroachment on the optic chiasm.       Pituitary MRI 5/26/23  There is a lesion identified within the anterior portion of the pituitary gland to the right of midline measuring 7 mm in transverse dimension consistent with a micro adenoma if clinically consistent.  No encroachment on the optic chiasm is identified.  No invasion of the cavernous sinus is suggested    Headache:    + chronic HA    Vision change:   denies    Formal Visual fields:   HVF not done (no significant suprasellar extension)      Current symptoms:  Hyperprolactinemia:  [x]  breast tenderness []  nipple discharge  []  Denies   Hx of gynecomastia  Lab Results   Component Value Date    PROLACTIN 13.0 11/10/2023    PROLACTIN 21.3 (H) 08/18/2023    PROLACTIN 46.5 (H) 06/14/2023    PROLACTIN 48.3 (H) 03/23/2023     Thyroid:    Lab Results   Component Value Date    TSH 1.212 06/14/2023    FREET4 1.02 06/14/2023          Growth  Hormone Excess:    Last IGF-1:   Lab Results   Component Value Date    SOMATMDN 115 06/14/2023      Cushing's syndrome:   []  Easy bruising []  Weight gain  []  Worse glycemic control   [x]  HTN  []  Acne  []  Hirsutism  []  Striae  []  Menstrual change []  VTE   []  Fractures  []  Denies All    Lab Results   Component Value Date    HGBA1C 5.3 01/13/2020      Gonadotrophs:  Symptoms of low testosterone for several years.  Was on testosterone injections but stopped for polycythemia, off for a couple year.    No improvement on cabergoline    []  Irregular menses []  Postmenopausal  [x]  Decreased libido   [x]  ED   []  Denies    Lab Results   Component Value Date    HCT 41.8 08/18/2023    HCT 43.9 03/23/2023    HCT 35.7 (L) 09/23/2022    TOTALTESTOST 273 (L) 08/18/2023    TOTALTESTOST 238 (L) 06/14/2023    TOTALTESTOST 1161 05/01/2020    TESTOSTERONE 246 (L) 03/30/2023    TESTOSTERONE 32.5 (L) 03/30/2023    PSA 0.49 03/23/2023    PSA 0.98 05/01/2020    HDL 22 (L) 08/18/2023    HDL 9 (L) 09/08/2022    TRIG 280 (H) 08/18/2023    TRIG 492 (H) 09/08/2022    ALT 69 (H) 08/18/2023    ALT 49 (H) 11/18/2022    ALT 29 10/03/2022    AST 48 (H) 08/18/2023    AST 31 11/18/2022    AST 26 10/03/2022     Wt Readings from Last 3 Encounters:   01/30/24 115.3 kg (254 lb 3.1 oz)   01/09/24 113.3 kg (249 lb 10.7 oz)   06/20/23 112.7 kg (248 lb 7.3 oz)   Working on diet, weight trending up  Drinking coke      Current Outpatient Medications:     albuterol (PROVENTIL/VENTOLIN HFA) 90 mcg/actuation inhaler, Inhale 2 puffs into the lungs every 4 (four) hours as needed. Rescue, Disp: 18 g, Rfl: 5    alfuzosin (UROXATRAL) 10 mg Tb24, Take 1 tablet by mouth once daily, Disp: 90 tablet, Rfl: 2    atorvastatin (LIPITOR) 40 MG tablet, Take 1 tablet by mouth once daily, Disp: 90 tablet, Rfl: 1    cabergoline (DOSTINEX) 0.5 mg tablet, Take 0.5 tablets (0.25 mg total) by mouth 3 (three) times a week., Disp: 18 tablet, Rfl: 2    cyclobenzaprine  (FLEXERIL) 10 MG tablet, Take 1 tablet (10 mg total) by mouth nightly as needed for Muscle spasms., Disp: 30 tablet, Rfl: 2    ergocalciferol (ERGOCALCIFEROL) 50,000 unit Cap, Take 1 capsule by mouth once a week, Disp: 12 capsule, Rfl: 3    fenofibrate (TRICOR) 145 MG tablet, Take 1 tablet (145 mg total) by mouth once daily., Disp: 90 tablet, Rfl: 1    gabapentin (NEURONTIN) 800 MG tablet, Take 1 tablet (800 mg total) by mouth 2 (two) times daily., Disp: 60 tablet, Rfl: 5    HYDROcodone-acetaminophen (NORCO)  mg per tablet, Take 1 tablet by mouth every 6 (six) hours as needed., Disp: , Rfl:     isosorbide mononitrate (IMDUR) 30 MG 24 hr tablet, Take 2 tablets (60 mg total) by mouth once daily., Disp: 60 tablet, Rfl: 11    latanoprost 0.005 % ophthalmic solution, Place 1 drop into both eyes every evening., Disp: , Rfl:     meloxicam (MOBIC) 7.5 MG tablet, Take 1 tablet (7.5 mg total) by mouth once daily., Disp: 60 tablet, Rfl: 5    metoprolol succinate (TOPROL-XL) 25 MG 24 hr tablet, Take 1 tablet by mouth once daily, Disp: 90 tablet, Rfl: 3    omega-3 acid ethyl esters (LOVAZA) 1 gram capsule, Take 2 capsules by mouth twice daily, Disp: 360 capsule, Rfl: 1    oxyCODONE-acetaminophen (PERCOCET)  mg per tablet, Take 1 tablet by mouth 4 (four) times daily as needed., Disp: , Rfl:     sertraline (ZOLOFT) 100 MG tablet, Take 1 tablet (100 mg total) by mouth once daily., Disp: 90 tablet, Rfl: 1    diazePAM (VALIUM) 10 MG Tab, Take 1 tablet (10 mg total) by mouth once. for 1 dose, Disp: 1 tablet, Rfl: 0    LORazepam (ATIVAN) 1 MG tablet, Take 1 tablet (1 mg total) by mouth as needed for Anxiety (for MRI). Take one tablet 30 min before MRI, if needed addition tablet right before MRI.  Do not drive or operate dangerous machinery for a minimum of 12 hours after, Disp: 2 tablet, Rfl: 0  ROS:  see HPI    Objective:   Physical Exam   /73 (BP Location: Right arm, Patient Position: Sitting, BP Method: Medium  "(Automatic))   Pulse 81   Ht 5' 9" (1.753 m)   Wt 115.3 kg (254 lb 3.1 oz)   BMI 37.54 kg/m²   Wt Readings from Last 3 Encounters:   01/30/24 115.3 kg (254 lb 3.1 oz)   01/09/24 113.3 kg (249 lb 10.7 oz)   06/20/23 112.7 kg (248 lb 7.3 oz)   ]    Constitutional:  Pleasant,  in no acute distress.   HENT:   Eyes:     No scleral icterus.   Respiratory:   Effort normal   Neurological:  normal speech  Psych:  Normal mood and affect.      LABORATORY REVIEW:    Chemistry        Component Value Date/Time     08/18/2023 1016    K 3.9 08/18/2023 1016     08/18/2023 1016    CO2 27 08/18/2023 1016    BUN 18 08/18/2023 1016    CREATININE 1.2 08/18/2023 1016    GLU 91 08/18/2023 1016        Component Value Date/Time    CALCIUM 9.6 08/18/2023 1016    ALKPHOS 47 (L) 08/18/2023 1016    AST 48 (H) 08/18/2023 1016    ALT 69 (H) 08/18/2023 1016    BILITOT 0.5 08/18/2023 1016    ESTGFRAFRICA 48.6 (A) 12/16/2020 1214    EGFRNONAA 42.0 (A) 12/16/2020 1214        Lab Results   Component Value Date    CALCIUM 9.6 08/18/2023    ALBUMIN 4.2 08/18/2023    ESTGFRAFRICA 48.6 (A) 12/16/2020    EGFRNONAA 42.0 (A) 12/16/2020    ALKPHOS 47 (L) 08/18/2023    SIBXINJS48XB 27 (L) 12/16/2020    TSH 1.212 06/14/2023        Pituitary MRI 5/26/23  There is a lesion identified within the anterior portion of the pituitary gland to the right of midline measuring 7 mm in transverse dimension consistent with a micro adenoma if clinically consistent.  No encroachment on the optic chiasm is identified.  No invasion of the cavernous sinus is suggested        Assessment/Plan:     Problem List Items Addressed This Visit       Elevated prolactin level    Relevant Medications    cabergoline (DOSTINEX) 0.5 mg tablet (Start on 1/31/2024)    Hypogonadism male - Primary     Prolactin normalized, will check 8 am fasting testosterone to look for normalization      Low testosterone may also be partially due to obesity.  Continue to work on lifestyle " modifications to help with weight reduction.  Also needs sleep study as untreated KHRIS can contribute to low T         Relevant Orders    Testosterone Panel    CBC Auto Differential    Pituitary lesion     Prolactin now normal on cabergoline, recheck MRI in 12 month(s) to look for decrease in size  of tumor             Relevant Orders    Prolactin     Other Visit Diagnoses       Disorder of pituitary gland, unspecified        Relevant Orders    MRI Pituitary W W/O Contrast            Return to clinic in 12 months with MRI and 8 am fasting lab prior for prolactin and testosterone 2 wks prior.     Hazel Robles MD

## 2024-01-30 NOTE — ASSESSMENT & PLAN NOTE
Prolactin normalized, will check 8 am fasting testosterone to look for normalization      Low testosterone may also be partially due to obesity.  Continue to work on lifestyle modifications to help with weight reduction.  Also needs sleep study as untreated KHRIS can contribute to low T

## 2024-02-06 ENCOUNTER — OFFICE VISIT (OUTPATIENT)
Dept: PRIMARY CARE CLINIC | Facility: CLINIC | Age: 59
End: 2024-02-06
Payer: MEDICARE

## 2024-02-06 VITALS
OXYGEN SATURATION: 97 % | HEART RATE: 71 BPM | RESPIRATION RATE: 18 BRPM | SYSTOLIC BLOOD PRESSURE: 118 MMHG | HEIGHT: 69 IN | WEIGHT: 258.25 LBS | BODY MASS INDEX: 38.25 KG/M2 | DIASTOLIC BLOOD PRESSURE: 76 MMHG

## 2024-02-06 DIAGNOSIS — E78.5 HYPERLIPIDEMIA, UNSPECIFIED HYPERLIPIDEMIA TYPE: Primary | ICD-10-CM

## 2024-02-06 DIAGNOSIS — I10 HYPERTENSION, UNSPECIFIED TYPE: ICD-10-CM

## 2024-02-06 DIAGNOSIS — R79.89 ELEVATED PROLACTIN LEVEL: ICD-10-CM

## 2024-02-06 DIAGNOSIS — R07.89 ATYPICAL CHEST PAIN: ICD-10-CM

## 2024-02-06 DIAGNOSIS — E29.1 HYPOGONADISM MALE: ICD-10-CM

## 2024-02-06 DIAGNOSIS — M51.36 DDD (DEGENERATIVE DISC DISEASE), LUMBAR: ICD-10-CM

## 2024-02-06 DIAGNOSIS — E23.7 PITUITARY LESION: ICD-10-CM

## 2024-02-06 DIAGNOSIS — F32.0 CURRENT MILD EPISODE OF MAJOR DEPRESSIVE DISORDER WITHOUT PRIOR EPISODE: ICD-10-CM

## 2024-02-06 DIAGNOSIS — I25.10 CORONARY ARTERY DISEASE INVOLVING NATIVE CORONARY ARTERY OF NATIVE HEART, UNSPECIFIED WHETHER ANGINA PRESENT: ICD-10-CM

## 2024-02-06 DIAGNOSIS — N20.0 BILATERAL NEPHROLITHIASIS: ICD-10-CM

## 2024-02-06 DIAGNOSIS — M50.30 DDD (DEGENERATIVE DISC DISEASE), CERVICAL: ICD-10-CM

## 2024-02-06 DIAGNOSIS — Z98.1 HISTORY OF LUMBAR FUSION: ICD-10-CM

## 2024-02-06 DIAGNOSIS — G89.4 CHRONIC PAIN SYNDROME: ICD-10-CM

## 2024-02-06 DIAGNOSIS — E66.9 OBESITY (BMI 35.0-39.9 WITHOUT COMORBIDITY): ICD-10-CM

## 2024-02-06 DIAGNOSIS — F41.9 ANXIETY: ICD-10-CM

## 2024-02-06 DIAGNOSIS — F32.9 REACTIVE DEPRESSION: ICD-10-CM

## 2024-02-06 PROCEDURE — 3074F SYST BP LT 130 MM HG: CPT | Mod: HCNC,CPTII,S$GLB, | Performed by: FAMILY MEDICINE

## 2024-02-06 PROCEDURE — 3078F DIAST BP <80 MM HG: CPT | Mod: HCNC,CPTII,S$GLB, | Performed by: FAMILY MEDICINE

## 2024-02-06 PROCEDURE — 1159F MED LIST DOCD IN RCRD: CPT | Mod: HCNC,CPTII,S$GLB, | Performed by: FAMILY MEDICINE

## 2024-02-06 PROCEDURE — 99999 PR PBB SHADOW E&M-EST. PATIENT-LVL III: CPT | Mod: PBBFAC,HCNC,, | Performed by: FAMILY MEDICINE

## 2024-02-06 PROCEDURE — 3008F BODY MASS INDEX DOCD: CPT | Mod: HCNC,CPTII,S$GLB, | Performed by: FAMILY MEDICINE

## 2024-02-06 PROCEDURE — 99214 OFFICE O/P EST MOD 30 MIN: CPT | Mod: HCNC,S$GLB,, | Performed by: FAMILY MEDICINE

## 2024-02-06 PROCEDURE — 3044F HG A1C LEVEL LT 7.0%: CPT | Mod: HCNC,CPTII,S$GLB, | Performed by: FAMILY MEDICINE

## 2024-02-06 RX ORDER — ALFUZOSIN HYDROCHLORIDE 10 MG/1
10 TABLET, EXTENDED RELEASE ORAL
Qty: 90 TABLET | Refills: 2 | Status: SHIPPED | OUTPATIENT
Start: 2024-02-06

## 2024-02-06 RX ORDER — CABERGOLINE 0.5 MG/1
0.25 TABLET ORAL
Qty: 18 TABLET | Refills: 2 | Status: CANCELLED | OUTPATIENT
Start: 2024-02-07 | End: 2025-02-06

## 2024-02-06 RX ORDER — METOPROLOL SUCCINATE 25 MG/1
25 TABLET, EXTENDED RELEASE ORAL DAILY
Qty: 90 TABLET | Refills: 3 | Status: SHIPPED | OUTPATIENT
Start: 2024-02-06

## 2024-02-06 RX ORDER — ALBUTEROL SULFATE 90 UG/1
2 AEROSOL, METERED RESPIRATORY (INHALATION) EVERY 4 HOURS PRN
Qty: 18 G | Refills: 5 | Status: SHIPPED | OUTPATIENT
Start: 2024-02-06

## 2024-02-06 RX ORDER — ATORVASTATIN CALCIUM 40 MG/1
40 TABLET, FILM COATED ORAL DAILY
Qty: 90 TABLET | Refills: 3 | Status: SHIPPED | OUTPATIENT
Start: 2024-02-06 | End: 2024-04-09

## 2024-02-06 RX ORDER — ERGOCALCIFEROL 1.25 MG/1
50000 CAPSULE ORAL
Qty: 12 CAPSULE | Refills: 3 | Status: SHIPPED | OUTPATIENT
Start: 2024-02-06

## 2024-02-06 RX ORDER — GABAPENTIN 800 MG/1
800 TABLET ORAL 2 TIMES DAILY
Qty: 60 TABLET | Refills: 5 | Status: SHIPPED | OUTPATIENT
Start: 2024-02-06

## 2024-02-06 RX ORDER — SERTRALINE HYDROCHLORIDE 100 MG/1
100 TABLET, FILM COATED ORAL DAILY
Qty: 90 TABLET | Refills: 3 | Status: SHIPPED | OUTPATIENT
Start: 2024-02-06

## 2024-02-06 RX ORDER — FENOFIBRATE 145 MG/1
145 TABLET, FILM COATED ORAL DAILY
Qty: 90 TABLET | Refills: 3 | Status: SHIPPED | OUTPATIENT
Start: 2024-02-06

## 2024-02-06 RX ORDER — ISOSORBIDE MONONITRATE 30 MG/1
60 TABLET, EXTENDED RELEASE ORAL DAILY
Qty: 180 TABLET | Refills: 3 | Status: SHIPPED | OUTPATIENT
Start: 2024-02-06 | End: 2024-04-09

## 2024-02-06 RX ORDER — MELOXICAM 7.5 MG/1
7.5 TABLET ORAL DAILY
Qty: 60 TABLET | Refills: 5 | Status: SHIPPED | OUTPATIENT
Start: 2024-02-06 | End: 2024-04-09

## 2024-02-06 RX ORDER — CYCLOBENZAPRINE HCL 10 MG
10 TABLET ORAL NIGHTLY PRN
Qty: 30 TABLET | Refills: 2 | Status: SHIPPED | OUTPATIENT
Start: 2024-02-06

## 2024-02-06 NOTE — PROGRESS NOTES
Subjective:       Patient ID: Collin Almaguer Sr. is a 59 y.o. male.    Chief Complaint: 6 month check up        HPI  60 yo WM in for six-month checkup--eating well--+BM--ambulating wel  MRI brain--7 mm right anterior pituitary lesion --pt with HA frontal area to the occipital area and in the neck is killing patient --last 4-6 months pain is mainly in the occipital area  Sees Dr Ely --had angiogram told had 100 % blockage and has collateral circulation --endocrinologist gave patient some medications for 6 months redo the MRI and said that the lesion stable--so rechecked the size of the lesion in 1 year  History hyperlipidemia on atorvastatin  History hypertension on Toprol blood pressure 118/76  History of MI on Imdur  History depression on Zoloft   history of bronchospasm on albuterol  Hx Dr Parks testosterone not seen him in awhile due to monitoring of the pituitary lesion  History of some atypical chest pain had angiogram September 2022 within normal limits--did have 1 lesion that was 100% stone nose but had collateral circulation--told no need for stent   Most days goes backyard occas track tries get in exercise Watches TV   Lab CBCs CMP lipids prolactin all basically within normal limit prolactin level now normal hemoglobin A1c normal no need to redo labs for six-month       ROS:  Uses inhaler  Skin: no psoriasis, eczema, skin cancer--Skin Cancer left cheek--Dr Wise---was sent to a place where skin cancer was removed  HEENT: +occs  headache see HPI --pituitary tumor--still having headache ,no  ocular pain, blurred vision, diplopia, epistaxis, hoarseness change in voice, thyroid trouble +glaucoma itis bilaterally left greater than right  Lung: No pneumonia, +asthma as child ,no  Tb, wheezing, SOB, no smoking occasional bronchospasm  Heart: no chest pain,no ankle edema, palpitations, MI, tiffanie murmur,+hypertension,+ hyperlipidemia--no stent bypass arrhythmia--never seen by cardiologist   Abdomen: no  nausea, no  vomiting, diarrhea,+ constipation-a lot better no  ulcers, hepatitis, gallbladder disease, melena, hematochezia, hematemesis   : no UTI, renal disease, stones prostate  MS: no fractures, O/A, lupus, rheumatoid, gout--neck and back problem--old xray  OK --laminectomy and rods hips --last fall patient gel over pile of debris in yd 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  Neuro: no  Dizziness, no  LOC, seizures   No diabetes, no anemia, +anxiety, + depression on meds Disabled  --being home all the time makes patient depressed--found father is at home  2014  , 5 children, disability lives alone        Objective:   Physical Exam:  General: Well nourished, well developed, no acute distress + morbid obesity  Skin:  No lesion   HEENT: Eyes PERRLA, EOM intact, nose patent, throat non-erythematous ears TMs clear  NECK: Supple, no bruits, No JVD, no nodes   Lungs: Clear, no rales, rhonchi, wheezing  Heart: Regular rate and rhythm, no murmurs, gallops, or rubs  Abdomen: flat, bowel sounds positive, no tenderness, or organomegaly some slight ventral herniation--mild--some complaints of right costal angle swelling that comes and goes suggestive of hernia   MS:-- tenderness lumbar spine anterior flexion 10° extension 10° lateral flexion rotation 10°--walks with wide-based gait--intermittent numbness left foot  Neuro: Alert, CN intact, oriented X 3 Romberg negative heel-toe slight swaying due back issues   Extremities: No cyanosis, clubbing, or edema         Assessment:       1. Hyperlipidemia, unspecified hyperlipidemia type    2. Coronary artery disease involving native coronary artery of native heart, unspecified whether angina present    3. Elevated prolactin level    4. DDD (degenerative disc disease), cervical    5. DDD (degenerative disc disease), lumbar    6. Chronic pain syndrome    7. History of lumbar fusion    8. Anxiety    9. Reactive depression    10. Hypertension,  unspecified type    11. Bilateral nephrolithiasis    12. Hypogonadism male    13. Obesity (BMI 35.0-39.9 without comorbidity)    14. Pituitary lesion    15. Atypical chest pain    16. Current mild episode of major depressive disorder without prior episode            Plan:       Hyperlipidemia, unspecified hyperlipidemia type  -     atorvastatin (LIPITOR) 40 MG tablet; Take 1 tablet (40 mg total) by mouth once daily.  Dispense: 90 tablet; Refill: 3    Coronary artery disease involving native coronary artery of native heart, unspecified whether angina present  Comments:  continue with risk modification and add asa 1 mg daily  Orders:  -     atorvastatin (LIPITOR) 40 MG tablet; Take 1 tablet (40 mg total) by mouth once daily.  Dispense: 90 tablet; Refill: 3    Elevated prolactin level    DDD (degenerative disc disease), cervical    DDD (degenerative disc disease), lumbar    Chronic pain syndrome    History of lumbar fusion    Anxiety    Reactive depression    Hypertension, unspecified type    Bilateral nephrolithiasis    Hypogonadism male    Obesity (BMI 35.0-39.9 without comorbidity)    Pituitary lesion    Atypical chest pain    Current mild episode of major depressive disorder without prior episode    Other orders  -     albuterol (PROVENTIL/VENTOLIN HFA) 90 mcg/actuation inhaler; Inhale 2 puffs into the lungs every 4 (four) hours as needed. Rescue  Dispense: 18 g; Refill: 5  -     alfuzosin (UROXATRAL) 10 mg Tb24; Take 1 tablet (10 mg total) by mouth daily with breakfast.  Dispense: 90 tablet; Refill: 2  -     cyclobenzaprine (FLEXERIL) 10 MG tablet; Take 1 tablet (10 mg total) by mouth nightly as needed for Muscle spasms.  Dispense: 30 tablet; Refill: 2  -     ergocalciferol (ERGOCALCIFEROL) 50,000 unit Cap; Take 1 capsule (50,000 Units total) by mouth every 7 days.  Dispense: 12 capsule; Refill: 3  -     fenofibrate (TRICOR) 145 MG tablet; Take 1 tablet (145 mg total) by mouth once daily.  Dispense: 90 tablet;  Refill: 3  -     gabapentin (NEURONTIN) 800 MG tablet; Take 1 tablet (800 mg total) by mouth 2 (two) times daily.  Dispense: 60 tablet; Refill: 5  -     isosorbide mononitrate (IMDUR) 30 MG 24 hr tablet; Take 2 tablets (60 mg total) by mouth once daily.  Dispense: 180 tablet; Refill: 3  -     meloxicam (MOBIC) 7.5 MG tablet; Take 1 tablet (7.5 mg total) by mouth once daily.  Dispense: 60 tablet; Refill: 5  -     metoprolol succinate (TOPROL-XL) 25 MG 24 hr tablet; Take 1 tablet (25 mg total) by mouth once daily.  Dispense: 90 tablet; Refill: 3  -     sertraline (ZOLOFT) 100 MG tablet; Take 1 tablet (100 mg total) by mouth once daily.  Dispense: 90 tablet; Refill: 3            Multiple medical issues --    MRI the brain--due to headaches--7 mm anterior pituitary tumor--patient was txed with medication x 6 mo --lesion same size told redo MRI one year check size tumor   Numbness in the left foot--intermittent bilateral foot pain--needs to see Dr. Alatorre--Neuro surgery--history laminectomy chronic back pain   Low testosterone patient needs to see Urology Dr Parks --but told wait until pituitary workup complete   Skin lesion right side of the nose with increased vasculature see Dr. Wise sent pt to surgeon for surgical removal   MRI lumbar spine showed fusion L3-S1 with laminectomy/L1 to with some moderate to severe cord compression due to bulging disc and facet hyper  Chronic back pain--goes to chronic pain clinic---x-ray reviewed from 2020 of pelvic--shows lumbar fusion--with laminectomy--with rods going to both hips--patient states had recent x-ray had diagnostic imaging to be reviewed by Neuro surgery  Morbid obesity--exercise trying get down to ideal body weight--could consider gastric sleeve or bypass --patient was asking me for Adipex told I do not write it ecent   Multiple other medical issues not addressed this visit  Depression--needs to see a psychiatrist--multiple issues that need to be addressed should  be on medication for depression--needs to see psychiatrist--if on SSRI such as Lexapro initially would be helpful with weight but in time but making gain weight may benefit from Wellbutrin  Hyperlipidemia patient on Lipitor tri cor  Hypertension/hyperlipidemia metoprolol 25 mg  History vitamin-D deficiency on vitamin-D  Lab  CBCs CMP lipid free and total testosterone prolactin level  in 6 mo   Needs to see neurosurgeon to evaluate lower back  Appt cardiology Dr Lopez for chest pain history angiogram 2022 with a block vessel with collateral circulation some atypical chest pain Had EKG Jan 2024 nonspecific ST wave changes   Health maintenance see she

## 2024-02-19 ENCOUNTER — PATIENT MESSAGE (OUTPATIENT)
Dept: CARDIOLOGY | Facility: CLINIC | Age: 59
End: 2024-02-19
Payer: MEDICARE

## 2024-04-09 ENCOUNTER — OFFICE VISIT (OUTPATIENT)
Dept: CARDIOLOGY | Facility: CLINIC | Age: 59
End: 2024-04-09
Payer: MEDICARE

## 2024-04-09 VITALS
HEART RATE: 95 BPM | HEIGHT: 69 IN | BODY MASS INDEX: 37.91 KG/M2 | OXYGEN SATURATION: 97 % | WEIGHT: 255.94 LBS | DIASTOLIC BLOOD PRESSURE: 74 MMHG | SYSTOLIC BLOOD PRESSURE: 117 MMHG

## 2024-04-09 DIAGNOSIS — E78.00 PURE HYPERCHOLESTEROLEMIA: ICD-10-CM

## 2024-04-09 DIAGNOSIS — I70.0 AORTIC ATHEROSCLEROSIS: ICD-10-CM

## 2024-04-09 DIAGNOSIS — I50.32 CHRONIC HEART FAILURE WITH PRESERVED EJECTION FRACTION: ICD-10-CM

## 2024-04-09 DIAGNOSIS — I10 HYPERTENSION, UNSPECIFIED TYPE: Primary | ICD-10-CM

## 2024-04-09 DIAGNOSIS — R93.1 ABNORMAL ECHOCARDIOGRAM: ICD-10-CM

## 2024-04-09 DIAGNOSIS — I25.10 ATHEROSCLEROSIS OF NATIVE CORONARY ARTERY OF NATIVE HEART WITHOUT ANGINA PECTORIS: ICD-10-CM

## 2024-04-09 PROCEDURE — 99999 PR PBB SHADOW E&M-EST. PATIENT-LVL IV: CPT | Mod: PBBFAC,HCNC,, | Performed by: INTERNAL MEDICINE

## 2024-04-09 PROCEDURE — 3074F SYST BP LT 130 MM HG: CPT | Mod: HCNC,CPTII,S$GLB, | Performed by: INTERNAL MEDICINE

## 2024-04-09 PROCEDURE — 3078F DIAST BP <80 MM HG: CPT | Mod: HCNC,CPTII,S$GLB, | Performed by: INTERNAL MEDICINE

## 2024-04-09 PROCEDURE — 99215 OFFICE O/P EST HI 40 MIN: CPT | Mod: HCNC,S$GLB,, | Performed by: INTERNAL MEDICINE

## 2024-04-09 PROCEDURE — 3008F BODY MASS INDEX DOCD: CPT | Mod: HCNC,CPTII,S$GLB, | Performed by: INTERNAL MEDICINE

## 2024-04-09 PROCEDURE — 1159F MED LIST DOCD IN RCRD: CPT | Mod: HCNC,CPTII,S$GLB, | Performed by: INTERNAL MEDICINE

## 2024-04-09 PROCEDURE — 3044F HG A1C LEVEL LT 7.0%: CPT | Mod: HCNC,CPTII,S$GLB, | Performed by: INTERNAL MEDICINE

## 2024-04-09 PROCEDURE — 1160F RVW MEDS BY RX/DR IN RCRD: CPT | Mod: HCNC,CPTII,S$GLB, | Performed by: INTERNAL MEDICINE

## 2024-04-09 RX ORDER — LISINOPRIL 2.5 MG/1
2.5 TABLET ORAL DAILY
Qty: 90 TABLET | Refills: 3 | Status: SHIPPED | OUTPATIENT
Start: 2024-04-09 | End: 2025-04-09

## 2024-04-09 RX ORDER — ROSUVASTATIN CALCIUM 40 MG/1
40 TABLET, COATED ORAL NIGHTLY
Qty: 90 TABLET | Refills: 3 | Status: SHIPPED | OUTPATIENT
Start: 2024-04-09 | End: 2025-04-09

## 2024-04-09 RX ORDER — ASPIRIN 81 MG/1
81 TABLET ORAL DAILY
Qty: 90 TABLET | Refills: 3 | Status: SHIPPED | OUTPATIENT
Start: 2024-04-09 | End: 2025-04-09

## 2024-04-09 NOTE — PROGRESS NOTES
[facility] [address]  Cardiology Clinic Note      Chief Complaint  Chief Complaint   Patient presents with    [facility] Care       HPI:      59-year-old male with past medical history hypertension, hyperlipidemia, Holter 2022 rare PVCs occasional PACs, coronary angiogram 2022 lad  with left-to-left collaterals, HFmrEF echo 2022 EF estimated 40% grade 1 diastolic dysfunction regional wall motion abnormalities in the LAD territory normal RV  Carotid ultrasound 2022 no hemodynamically significant stenosis  Unclear why the patient is on alfuzosin possibly BPH  No cardiovascular symptoms    EKG 01/2024 normal sinus rhythm nonspecific ST-T changes left atrial enlargement    Medications  Current Outpatient Medications   Medication Sig Dispense Refill    albuterol (PROVENTIL/VENTOLIN HFA) 90 mcg/actuation inhaler Inhale 2 puffs into the lungs every 4 (four) hours as needed. Rescue 18 g 5    alfuzosin (UROXATRAL) 10 mg Tb24 Take 1 tablet (10 mg total) by mouth daily with breakfast. 90 tablet 2    cabergoline (DOSTINEX) 0.5 mg tablet Take 0.5 tablets (0.25 mg total) by mouth 3 (three) times a week. 18 tablet 2    cyclobenzaprine (FLEXERIL) 10 MG tablet Take 1 tablet (10 mg total) by mouth nightly as needed for Muscle spasms. 30 tablet 2    ergocalciferol (ERGOCALCIFEROL) 50,000 unit Cap Take 1 capsule (50,000 Units total) by mouth every 7 days. 12 capsule 3    fenofibrate (TRICOR) 145 MG tablet Take 1 tablet (145 mg total) by mouth once daily. 90 tablet 3    gabapentin (NEURONTIN) 800 MG tablet Take 1 tablet (800 mg total) by mouth 2 (two) times daily. 60 tablet 5    metoprolol succinate (TOPROL-XL) 25 MG 24 hr tablet Take 1 tablet (25 mg total) by mouth once daily. 90 tablet 3    omega-3 acid ethyl esters (LOVAZA) 1 gram capsule Take 2 capsules by mouth twice daily 360 capsule 1    sertraline (ZOLOFT) 100 MG tablet Take 1 tablet (100 mg total) by mouth once daily. 90 tablet 3    aspirin (ECOTRIN) 81 MG EC  tablet Take 1 tablet (81 mg total) by mouth once daily. 90 tablet 3    latanoprost 0.005 % ophthalmic solution Place 1 drop into both eyes every evening.      lisinopriL (PRINIVIL,ZESTRIL) 2.5 MG tablet Take 1 tablet (2.5 mg total) by mouth once daily. 90 tablet 3    rosuvastatin (CRESTOR) 40 MG Tab Take 1 tablet (40 mg total) by mouth every evening. 90 tablet 3     No current facility-administered medications for this visit.        History  Past Medical History:   Diagnosis Date    Atherosclerosis of native coronary artery of native heart without angina pectoris 4/9/2024    Cancer     Had a tumor removed in the back    Depression     Hyperlipemia     Hypertension     Low testosterone     Vitamin D deficiency      Past Surgical History:   Procedure Laterality Date    BACK SURGERY      November 2008 in [place]    BASAL CELL CARCINOMA EXCISION      left side of lip/cheek    COLONOSCOPY N/A 01/04/2021    Procedure: COLONOSCOPY;  Surgeon: Eriberto Ladd MD;  Location: [facility] ENDO;  Service: Endoscopy;  Laterality: N/A;    COLONOSCOPY W/ POLYPECTOMY  01/04/2021    colonoscopy w/ polypectomy per  01/04/2021    CORONARY ANGIOGRAPHY  09/26/2022    CORONARY ANGIOGRAPHY Right 09/26/2022    Procedure: ANGIOGRAM, CORONARY ARTERY;  Surgeon: Anam Molina MD;  Location: [facility] CATH LAB;  Service: Cardiology;  Laterality: Right;    HERNIA REPAIR      LAMINECTOMY      PROSTATE BIOPSY      TONSILLECTOMY      1974     Social History     Socioeconomic History    Marital status: Legally    Tobacco Use    Smoking status: Never    Smokeless tobacco: Never   Substance and Sexual Activity    Alcohol use: No    Drug use: No     Family History   Problem Relation Age of Onset    Suicide Father     Heart disease Maternal Grandmother     Heart disease Maternal Grandfather         Allergies  Review of patient's allergies indicates:   Allergen Reactions    Fish containing products Hives     TUNA FISH      Pcn  [penicillins] Itching       Review of Systems   Review of Systems   Constitutional: Negative for fever.   HENT:  Negative for nosebleeds.    Eyes:  Negative for visual disturbance.   Cardiovascular:  Negative for chest pain, claudication, dyspnea on exertion, palpitations and syncope.   Respiratory:  Negative for cough, hemoptysis and wheezing.    Endocrine: Negative for cold intolerance, heat intolerance, polyphagia and polyuria.   Hematologic/Lymphatic: Negative for bleeding problem.   Skin:  Negative for rash.   Musculoskeletal:  Negative for myalgias.   Gastrointestinal:  Negative for hematemesis, hematochezia, nausea and vomiting.   Genitourinary:  Negative for dysuria.   Neurological:  Negative for focal weakness and sensory change.   Psychiatric/Behavioral:  Negative for altered mental status.        Physical Exam  Vitals:    04/09/24 1307   BP: 117/74   Pulse: 95     Wt Readings from Last 1 Encounters:   04/09/24 116.1 kg (255 lb 15.3 oz)     Physical Exam  HENT:      Head: Normocephalic and atraumatic.      Mouth/Throat:      Mouth: Mucous membranes are moist.   Eyes:      Extraocular Movements: Extraocular movements intact.      Pupils: Pupils are equal, round, and reactive to light.   Neck:      Vascular: No carotid bruit or JVD.   Cardiovascular:      Rate and Rhythm: Normal rate and regular rhythm.      Pulses: Normal pulses and intact distal pulses.      Heart sounds: Normal heart sounds. No murmur heard.     No friction rub. No gallop.   Pulmonary:      Effort: Pulmonary effort is normal.      Breath sounds: Normal breath sounds.   Abdominal:      Tenderness: There is no abdominal tenderness. There is no guarding or rebound.   Musculoskeletal:      Right lower leg: No edema.      Left lower leg: No edema.   Skin:     General: Skin is warm and dry.      Capillary Refill: Capillary refill takes less than 2 seconds.   Neurological:      General: No focal deficit present.      Mental Status: He is alert  and oriented to person, place, and time.   Psychiatric:         Mood and Affect: Mood normal.         Labs  Lab Visit on 02/04/2024   Component Date Value Ref Range Status    Sodium 02/04/2024 142  136 - 145 mmol/L Final    Potassium 02/04/2024 3.9  3.5 - 5.1 mmol/L Final    Chloride 02/04/2024 106  95 - 110 mmol/L Final    CO2 02/04/2024 26  23 - 29 mmol/L Final    Glucose 02/04/2024 94  70 - 110 mg/dL Final    BUN 02/04/2024 20  6 - 20 mg/dL Final    Creatinine 02/04/2024 1.1  0.5 - 1.4 mg/dL Final    Calcium 02/04/2024 9.2  8.7 - 10.5 mg/dL Final    Total Protein 02/04/2024 7.3  6.0 - 8.4 g/dL Final    Albumin 02/04/2024 3.8  3.5 - 5.2 g/dL Final    Total Bilirubin 02/04/2024 0.4  0.1 - 1.0 mg/dL Final    Comment: For infants and newborns, interpretation of results should be based  on gestational age, weight and in agreement with clinical  observations.    Premature Infant recommended reference ranges:  Up to 24 hours.............<8.0 mg/dL  Up to 48 hours............<12.0 mg/dL  3-5 days..................<15.0 mg/dL  6-29 days.................<15.0 mg/dL      Alkaline Phosphatase 02/04/2024 48 (L)  55 - 135 U/L Final    AST 02/04/2024 24  10 - 40 U/L Final    ALT 02/04/2024 33  10 - 44 U/L Final    eGFR 02/04/2024 >60.0  >60 mL/min/1.73 m^2 Final    Anion Gap 02/04/2024 10  8 - 16 mmol/L Final    Cholesterol 02/04/2024 158  120 - 199 mg/dL Final    Comment: The National Cholesterol Education Program (NCEP) has set the  following guidelines (reference ranges) for Cholesterol:  Optimal.....................<200 mg/dL  Borderline High.............200-239 mg/dL  High........................> or = 240 mg/dL      Triglycerides 02/04/2024 197 (H)  30 - 150 mg/dL Final    Comment: The National Cholesterol Education Program (NCEP) has set the  following guidelines (reference values) for triglycerides:  Normal......................<150 mg/dL  Borderline High.............150-199 mg/dL  High........................200-499  mg/dL      HDL 02/04/2024 23 (L)  40 - 75 mg/dL Final    Comment: The National Cholesterol Education Program (NCEP) has set the  following guidelines (reference values) for HDL Cholesterol:  Low...............<40 mg/dL  Optimal...........>60 mg/dL      LDL Cholesterol 02/04/2024 95.6  63.0 - 159.0 mg/dL Final    Comment: The National Cholesterol Education Program (NCEP) has set the  following guidelines (reference values) for LDL Cholesterol:  Optimal.......................<130 mg/dL  Borderline High...............130-159 mg/dL  High..........................160-189 mg/dL  Very High.....................>190 mg/dL      HDL/Cholesterol Ratio 02/04/2024 14.6 (L)  20.0 - 50.0 % Final    Total Cholesterol/HDL Ratio 02/04/2024 6.9 (H)  2.0 - 5.0 Final    Non-HDL Cholesterol 02/04/2024 135  mg/dL Final    Comment: Risk category and Non-HDL cholesterol goals:  Coronary heart disease (CHD)or equivalent (10-year risk of CHD >20%):  Non-HDL cholesterol goal     <130 mg/dL  Two or more CHD risk factors and 10-year risk of CHD <= 20%:  Non-HDL cholesterol goal     <160 mg/dL  0 to 1 CHD risk factor:  Non-HDL cholesterol goal     <190 mg/dL      Prolactin 02/04/2024 15.5  3.5 - 19.4 ng/mL Final    Testosterone 02/04/2024 378  250 - 1100 ng/dL Final    Comment: Men age 18 or above, with clinically significant hypogonadal symptoms  and total testosterone values repeatedly in the range of 200-300 ng/dL  or less, may benefit from testosterone treatment after adequate risk  and benefits counseling.    For additional information, please refer to  http://education.[employer].Interwise/faq/TotalTestosteroneLCMSMS  (This link is being provided for informational/educational purposes  only.)    This test was developed and its analytical performance characteristics  have been determined by [facility]. It has not been cleared or  approved by FDA. This assay has been validated pursuant to the CLIA  regulations and is used for clinical  purposes.    Test Performed at:  [facility] [address]  [address]     PRAMOD Suarez MD, PhD, NEIL      Testosterone, Free 02/04/2024 58.5  46.0 - 224.0 pg/mL Final    Comment: This test was developed and its analytical performance characteristics  have been determined by [facility]. It has not been cleared or  approved by FDA. This assay has been validated pursuant to the CLIA  regulations and is used for clinical purposes.      Testosterone, Bioavailable 02/04/2024 115.3  ng/dL Final    Comment: Reference Range:  110.0-575.0  Test Performed at:  [facility] [address]  [address]     PRAMOD Suarez MD, PhD, NEIL      Sex Hormone Binding Globulin 02/04/2024 27  22 - 77 nmol/L Final    Comment: Test Performed at:  [facility] [address]  [address]     PRAMOD Suarez MD, PhD, NEIL      Albumin 02/04/2024 4.3  3.6 - 5.1 g/dL Final    Comment: Test Performed at:  [facility] [address]  [address]     PRAMOD Suarez MD, PhD, NEIL      WBC 02/04/2024 5.86  3.90 - 12.70 K/uL Final    RBC 02/04/2024 4.60  4.60 - 6.20 M/uL Final    Hemoglobin 02/04/2024 13.4 (L)  14.0 - 18.0 g/dL Final    Hematocrit 02/04/2024 43.1  40.0 - 54.0 % Final    MCV 02/04/2024 94  82 - 98 fL Final    MCH 02/04/2024 29.1  27.0 - 31.0 pg Final    MCHC 02/04/2024 31.1 (L)  32.0 - 36.0 g/dL Final    RDW 02/04/2024 14.4  11.5 - 14.5 % Final    Platelets 02/04/2024 210  150 - 450 K/uL Final    MPV 02/04/2024 11.9  9.2 - 12.9 fL Final    Immature Granulocytes 02/04/2024 0.5  0.0 - 0.5 % Final    Gran # (ANC) 02/04/2024 3.7  1.8 - 7.7 K/uL Final    Immature Grans (Abs) 02/04/2024 0.03  0.00 - 0.04 K/uL Final    Comment: Mild elevation in immature granulocytes is non specific and   can be seen in a variety of conditions including  stress response,   acute inflammation, trauma and pregnancy. Correlation with other   laboratory and clinical findings is essential.      Lymph # 02/04/2024 1.6  1.0 - 4.8 K/uL Final    Mono # 02/04/2024 0.4  0.3 - 1.0 K/uL Final    Eos # 02/04/2024 0.2  0.0 - 0.5 K/uL Final    Baso # 02/04/2024 0.04  0.00 - 0.20 K/uL Final    nRBC 02/04/2024 0  0 /100 WBC Final    Gran % 02/04/2024 62.2  38.0 - 73.0 % Final    Lymph % 02/04/2024 26.5  18.0 - 48.0 % Final    Mono % 02/04/2024 6.5  4.0 - 15.0 % Final    Eosinophil % 02/04/2024 3.6  0.0 - 8.0 % Final    Basophil % 02/04/2024 0.7  0.0 - 1.9 % Final    Differential Method 02/04/2024 Automated   Final    Hemoglobin A1C 02/04/2024 5.2  4.0 - 5.6 % Final    Comment: ADA Screening Guidelines:  5.7-6.4%  Consistent with prediabetes  >or=6.5%  Consistent with diabetes    High levels of fetal hemoglobin interfere with the HbA1C  assay. Heterozygous hemoglobin variants (HbS, HgC, etc)do  not significantly interfere with this assay.   However, presence of multiple variants may affect accuracy.      Estimated Avg Glucose 02/04/2024 103  68 - 131 mg/dL Final   Lab Visit on 11/10/2023   Component Date Value Ref Range Status    Prolactin 11/10/2023 13.0  3.5 - 19.4 ng/mL Final       EKG  As above    Echo   Results for orders placed or performed in visit on 08/16/22   Echo   Result Value Ref Range    TDI SEPTAL 0.06 m/s    LV LATERAL E/E' RATIO 5.10 m/s    LV SEPTAL E/E' RATIO 8.50 m/s    Left Ventricular Outflow Tract Mean Velocity 0.81 cm/s    Left Ventricular Outflow Tract Mean Gradient 2.94 mmHg    AORTIC VALVE CUSP SEPERATION 17.872877907806802 cm    TDI LATERAL 0.10 m/s    PV PEAK VELOCITY 1.15 cm/s    LVIDd 4.13 3.5 - 6.0 cm    IVS 1.28 (A) 0.6 - 1.1 cm    Posterior Wall 1.18 (A) 0.6 - 1.1 cm    Ao root annulus 2.85 cm    LVIDs 3.30 2.1 - 4.0 cm    FS 20 28 - 44 %    Sinus 2.70 cm    Ascending aorta 2.93 cm    LV mass 180.10 g    RVDD 2.53 cm    Left Ventricle Relative Wall  Thickness 0.57 cm    AV mean gradient 5 mmHg    AV valve area 1.57 cm2    AV Velocity Ratio 0.76     AV index (prosthetic) 0.71     MV valve area p 1/2 method 3.18 cm2    E/A ratio 0.72     Mean e' 0.08 m/s    E wave deceleration time 223.20 msec    IVRT 57.09 msec    LVOT diameter 1.68 cm    LVOT area 2.2 cm2    LVOT peak danial 1.12 m/s    LVOT peak VTI 21.10 cm    Ao peak danial 1.48 m/s    Ao VTI 29.7 cm    LVOT stroke volume 46.75 cm3    AV peak gradient 9 mmHg    E/E' ratio 6.38 m/s    MV Peak E Danial 0.51 m/s    TR Max Danial 2.67 m/s    MV stenosis pressure 1/2 time 69.23 ms    MV Peak A Danial 0.71 m/s    LV Systolic Volume 43.98 mL    LV Diastolic Volume 75.52 mL    RA Major Axis 4.02 cm    Left Atrium Minor Axis 2.72 cm    Left Atrium Major Axis 5.32 cm    Triscuspid Valve Regurgitation Peak Gradient 29 mmHg    LA volume (mod) 0.00 cm3    RA Width 3.02 cm    EF 40 %    Narrative    · The left ventricle is normal in size with mild concentric hypertrophy   and mildly decreased systolic function.  · The estimated ejection fraction is 40%.  · Grade I left ventricular diastolic dysfunction.  · Normal right ventricular size with normal right ventricular systolic   function.  · There are segmental left ventricular wall motion abnormalities.  · There is focal anteroseptal akinesis and apical dyskinesis consistent   with an infarct          Imaging  No results found.    Prior coronary angiogram / intervention:  As above    Assessment and Plan  1. Hypertension, unspecified type  Add lisinopril to metoprolol and alfuzosin  Okay to stop Imdur  Suspect alfuzosin is for BPH  May change alfuzosin to Flomax to allow for titration of metoprolol and/or lisinopril in the future; Flomax may have less of an effect on blood pressure and allow for titration of other medications  - lisinopriL (PRINIVIL,ZESTRIL) 2.5 MG tablet; Take 1 tablet (2.5 mg total) by mouth once daily.  Dispense: 90 tablet; Refill: 3    2. Pure  hypercholesterolemia  Change statin to Crestor 40 repeat LDL next visit goal less than 70  - rosuvastatin (CRESTOR) 40 MG Tab; Take 1 tablet (40 mg total) by mouth every evening.  Dispense: 90 tablet; Refill: 3    3. Aortic atherosclerosis  Statin    4. Chronic heart failure with preserved ejection fraction  Continue beta-blocker add lisinopril  Euvolemic  Titrate GDMT  Wt Readings from Last 3 Encounters:   04/09/24 1307 116.1 kg (255 lb 15.3 oz)   02/06/24 0857 117.1 kg (258 lb 4.3 oz)   01/30/24 0857 115.3 kg (254 lb 3.1 oz)        6. Atherosclerosis of native coronary artery of native heart without angina pectoris  Change atorvastatin to Crestor  Add aspirin  Continue beta-blocker  Okay to stop Imdur  - rosuvastatin (CRESTOR) 40 MG Tab; Take 1 tablet (40 mg total) by mouth every evening.  Dispense: 90 tablet; Refill: 3  - aspirin (ECOTRIN) 81 MG EC tablet; Take 1 tablet (81 mg total) by mouth once daily.  Dispense: 90 tablet; Refill: 3        Follow Up  Follow up in about 3 months (around 7/9/2024).      Flavio Rosario MD, FAC, [facility]  Interventional Cardiology     Total professional time spent for the encounter: 40 minutes  Time was spent preparing to see the patient, reviewing results of prior testing, obtaining and/or reviewing separately obtained history, performing a medically appropriate examination and interview, counseling and educating the patient/family, ordering medications/tests/procedures, referring and communicating with other health care professionals, documenting clinical information in the electronic health record, and independently interpreting results..ion

## 2024-04-11 ENCOUNTER — PATIENT MESSAGE (OUTPATIENT)
Dept: CARDIOLOGY | Facility: CLINIC | Age: 59
End: 2024-04-11
Payer: MEDICARE

## 2024-04-13 ENCOUNTER — PATIENT MESSAGE (OUTPATIENT)
Dept: CARDIOLOGY | Facility: CLINIC | Age: 59
End: 2024-04-13
Payer: MEDICARE

## 2024-05-06 ENCOUNTER — PATIENT MESSAGE (OUTPATIENT)
Dept: ENDOCRINOLOGY | Facility: CLINIC | Age: 59
End: 2024-05-06
Payer: MEDICARE

## 2024-05-06 ENCOUNTER — TELEPHONE (OUTPATIENT)
Dept: CARDIOLOGY | Facility: CLINIC | Age: 59
End: 2024-05-06
Payer: MEDICARE

## 2024-05-06 NOTE — TELEPHONE ENCOUNTER
----- Message from Kimberly Nichols sent at 5/6/2024 12:22 PM CDT -----  Contact: [phone]  [email or web address]     Patient is calling for Medical Advice regarding: Chest discomfort and also would like a phone call regarding test that he supposed to be schedule since January.     How long has patient had these symptoms: since medicine was chance.     Pharmacy name and phone#:      [facility] - [place] - [address]  [address] [place] [address]  Phone: [phone] Fax: [phone]       Would like response via mychart:  phone     Comments:

## 2024-05-06 NOTE — TELEPHONE ENCOUNTER
Left detailed message for patient, I see the echocardiogram test was scheduled at [facility] that you missed.  If you want to reschedule the appointment, you may call the scheduling department directly at [phone].

## 2024-05-08 DIAGNOSIS — I25.10 CORONARY ARTERY DISEASE INVOLVING NATIVE CORONARY ARTERY OF NATIVE HEART, UNSPECIFIED WHETHER ANGINA PRESENT: ICD-10-CM

## 2024-05-08 DIAGNOSIS — E78.5 HYPERLIPIDEMIA, UNSPECIFIED HYPERLIPIDEMIA TYPE: ICD-10-CM

## 2024-05-08 RX ORDER — OMEGA-3-ACID ETHYL ESTERS 1 G/1
2 CAPSULE, LIQUID FILLED ORAL 2 TIMES DAILY
Qty: 360 CAPSULE | Refills: 2 | Status: SHIPPED | OUTPATIENT
Start: 2024-05-08

## 2024-05-08 NOTE — TELEPHONE ENCOUNTER
Care Due:                  Date            Visit Type   Department     Provider  --------------------------------------------------------------------------------                                EP -                              PRIMARY [facility]  Last Visit: 02-      CARE ([facility])   PRIMARY CARE   Jaret Armendariz                              EP - PRIMARY [facility]  Next Visit: 08-      CARE ([facility])   PRIMARY CARE   Jaret Armendariz                                                            Last  Test          Frequency    Reason                     Performed    Due Date  --------------------------------------------------------------------------------    Vitamin D...  12 months..  ergocalciferol...........  Not Found    Overdue    [employer] Embedded Care Due Messages. Reference number: [id number].   5/08/2024 7:01:42 AM CDT

## 2024-05-08 NOTE — TELEPHONE ENCOUNTER
Provider Staff:  Action required for this patient    Requires labs      Please see care gap opportunities below in Care Due Message.    Thanks!  [facility]     Appointments      Date Provider   Last Visit   2/6/2024 Jaret Armendariz MD   Next Visit   8/13/2024 Jaret Armendariz MD     Refill Decision Note   Collin Almaguer  is requesting a refill authorization.    Brief Assessment and Rationale for Refill:   Approve       Medication Therapy Plan:          Comments:     Note composed:12:54 PM 05/08/2024

## 2024-07-16 ENCOUNTER — PATIENT MESSAGE (OUTPATIENT)
Dept: CARDIOLOGY | Facility: CLINIC | Age: 59
End: 2024-07-16

## 2024-07-16 ENCOUNTER — OFFICE VISIT (OUTPATIENT)
Dept: CARDIOLOGY | Facility: CLINIC | Age: 59
End: 2024-07-16
Payer: MEDICARE

## 2024-07-16 VITALS
OXYGEN SATURATION: 96 % | DIASTOLIC BLOOD PRESSURE: 58 MMHG | BODY MASS INDEX: 39.06 KG/M2 | HEART RATE: 75 BPM | WEIGHT: 263.75 LBS | SYSTOLIC BLOOD PRESSURE: 112 MMHG | HEIGHT: 69 IN

## 2024-07-16 DIAGNOSIS — E78.00 PURE HYPERCHOLESTEROLEMIA: ICD-10-CM

## 2024-07-16 DIAGNOSIS — I20.9 ANGINA PECTORIS: ICD-10-CM

## 2024-07-16 DIAGNOSIS — I25.10 ATHEROSCLEROSIS OF NATIVE CORONARY ARTERY OF NATIVE HEART WITHOUT ANGINA PECTORIS: ICD-10-CM

## 2024-07-16 DIAGNOSIS — I10 HYPERTENSION, UNSPECIFIED TYPE: ICD-10-CM

## 2024-07-16 DIAGNOSIS — I50.32 CHRONIC HEART FAILURE WITH PRESERVED EJECTION FRACTION: Primary | ICD-10-CM

## 2024-07-16 DIAGNOSIS — N40.0 BENIGN PROSTATIC HYPERPLASIA, UNSPECIFIED WHETHER LOWER URINARY TRACT SYMPTOMS PRESENT: ICD-10-CM

## 2024-07-16 PROCEDURE — 1160F RVW MEDS BY RX/DR IN RCRD: CPT | Mod: CPTII,S$GLB,, | Performed by: INTERNAL MEDICINE

## 2024-07-16 PROCEDURE — 99999 PR PBB SHADOW E&M-EST. PATIENT-LVL III: CPT | Mod: PBBFAC,HCNC,, | Performed by: INTERNAL MEDICINE

## 2024-07-16 PROCEDURE — 3078F DIAST BP <80 MM HG: CPT | Mod: CPTII,S$GLB,, | Performed by: INTERNAL MEDICINE

## 2024-07-16 PROCEDURE — 99215 OFFICE O/P EST HI 40 MIN: CPT | Mod: S$GLB,,, | Performed by: INTERNAL MEDICINE

## 2024-07-16 PROCEDURE — 4010F ACE/ARB THERAPY RXD/TAKEN: CPT | Mod: CPTII,S$GLB,, | Performed by: INTERNAL MEDICINE

## 2024-07-16 PROCEDURE — 3044F HG A1C LEVEL LT 7.0%: CPT | Mod: CPTII,S$GLB,, | Performed by: INTERNAL MEDICINE

## 2024-07-16 PROCEDURE — 1159F MED LIST DOCD IN RCRD: CPT | Mod: CPTII,S$GLB,, | Performed by: INTERNAL MEDICINE

## 2024-07-16 PROCEDURE — 3074F SYST BP LT 130 MM HG: CPT | Mod: CPTII,S$GLB,, | Performed by: INTERNAL MEDICINE

## 2024-07-16 PROCEDURE — 3008F BODY MASS INDEX DOCD: CPT | Mod: CPTII,S$GLB,, | Performed by: INTERNAL MEDICINE

## 2024-07-16 RX ORDER — METOPROLOL SUCCINATE 50 MG/1
50 TABLET, EXTENDED RELEASE ORAL DAILY
Qty: 90 TABLET | Refills: 1 | Status: SHIPPED | OUTPATIENT
Start: 2024-07-16 | End: 2025-07-16

## 2024-07-16 RX ORDER — MELOXICAM 7.5 MG/1
7.5 TABLET ORAL
COMMUNITY
Start: 2024-07-13

## 2024-07-16 RX ORDER — TAMSULOSIN HYDROCHLORIDE 0.4 MG/1
0.4 CAPSULE ORAL DAILY
Qty: 90 CAPSULE | Refills: 1 | Status: SHIPPED | OUTPATIENT
Start: 2024-07-16 | End: 2025-07-16

## 2024-07-16 NOTE — PROGRESS NOTES
[facility] [id number]  Cardiology Clinic Note      Chief Complaint  Chief Complaint   Patient presents with    Follow-up       HPI:      59-year-old male with past medical history hypertension, hyperlipidemia, Holter 2022 rare PVCs occasional PACs, coronary angiogram 2022 LAD  with left-to-left collaterals, HFmrEF echo 2022 EF estimated 40% grade 1 diastolic dysfunction regional wall motion abnormalities in the LAD territory normal RV  Carotid ultrasound 2022 no hemodynamically significant stenosis  The patient appears to have been on alfuzosin for BPH    Last visit added lisinopril, changed statin to Crestor, added aspirin, discontinued Imdur    After stopping Imdur he developed chronic stable angina    EKG 01/2024 normal sinus rhythm nonspecific ST-T changes left atrial enlargement    Medications  Current Outpatient Medications   Medication Sig Dispense Refill    alfuzosin (UROXATRAL) 10 mg Tb24 Take 1 tablet (10 mg total) by mouth daily with breakfast. 90 tablet 2    aspirin (ECOTRIN) 81 MG EC tablet Take 1 tablet (81 mg total) by mouth once daily. 90 tablet 3    cabergoline (DOSTINEX) 0.5 mg tablet Take 0.5 tablets (0.25 mg total) by mouth 3 (three) times a week. 18 tablet 2    cyclobenzaprine (FLEXERIL) 10 MG tablet Take 1 tablet (10 mg total) by mouth nightly as needed for Muscle spasms. 30 tablet 2    ergocalciferol (ERGOCALCIFEROL) 50,000 unit Cap Take 1 capsule (50,000 Units total) by mouth every 7 days. 12 capsule 3    fenofibrate (TRICOR) 145 MG tablet Take 1 tablet (145 mg total) by mouth once daily. 90 tablet 3    gabapentin (NEURONTIN) 800 MG tablet Take 1 tablet (800 mg total) by mouth 2 (two) times daily. 60 tablet 5    lisinopriL (PRINIVIL,ZESTRIL) 2.5 MG tablet Take 1 tablet (2.5 mg total) by mouth once daily. 90 tablet 3    meloxicam (MOBIC) 7.5 MG tablet Take 7.5 mg by mouth.      metoprolol succinate (TOPROL-XL) 25 MG 24 hr tablet Take 1 tablet (25 mg total) by mouth once daily. 90  tablet 3    omega-3 acid ethyl esters (LOVAZA) 1 gram capsule Take 2 capsules by mouth twice daily 360 capsule 2    rosuvastatin (CRESTOR) 40 MG Tab Take 1 tablet (40 mg total) by mouth every evening. 90 tablet 3    sertraline (ZOLOFT) 100 MG tablet Take 1 tablet (100 mg total) by mouth once daily. 90 tablet 3    albuterol (PROVENTIL/VENTOLIN HFA) 90 mcg/actuation inhaler Inhale 2 puffs into the lungs every 4 (four) hours as needed. Rescue 18 g 5    latanoprost 0.005 % ophthalmic solution Place 1 drop into both eyes every evening.       No current facility-administered medications for this visit.        History  Past Medical History:   Diagnosis Date    Atherosclerosis of native coronary artery of native heart without angina pectoris 4/9/2024    Cancer     Had a tumor removed in the back    Depression     Hyperlipemia     Hypertension     Low testosterone     Vitamin D deficiency      Past Surgical History:   Procedure Laterality Date    BACK SURGERY      November 2008 in [place]    BASAL CELL CARCINOMA EXCISION      left side of lip/cheek    COLONOSCOPY N/A 01/04/2021    Procedure: COLONOSCOPY;  Surgeon: Eriberto Ladd MD;  Location: [facility] ENDO;  Service: Endoscopy;  Laterality: N/A;    COLONOSCOPY W/ POLYPECTOMY  01/04/2021    colonoscopy w/ polypectomy per  01/04/2021    CORONARY ANGIOGRAPHY  09/26/2022    CORONARY ANGIOGRAPHY Right 09/26/2022    Procedure: ANGIOGRAM, CORONARY ARTERY;  Surgeon: Anam Molina MD;  Location: [facility] CATH LAB;  Service: Cardiology;  Laterality: Right;    HERNIA REPAIR      LAMINECTOMY      PROSTATE BIOPSY      TONSILLECTOMY      1974     Social History     Socioeconomic History    Marital status: Legally    Tobacco Use    Smoking status: Never    Smokeless tobacco: Never   Substance and Sexual Activity    Alcohol use: No    Drug use: No     Family History   Problem Relation Name Age of Onset    Suicide Father      Heart disease Maternal Grandmother       Heart disease Maternal Grandfather          Allergies  Review of patient's allergies indicates:   Allergen Reactions    Fish containing products Hives     TUNA FISH      Pcn [penicillins] Itching       Review of Systems   Review of Systems   Constitutional: Negative for fever.   HENT:  Negative for nosebleeds.    Eyes:  Negative for visual disturbance.   Cardiovascular:  Negative for chest pain, claudication, dyspnea on exertion, palpitations and syncope.   Respiratory:  Negative for cough, hemoptysis and wheezing.    Endocrine: Negative for cold intolerance, heat intolerance, polyphagia and polyuria.   Hematologic/Lymphatic: Negative for bleeding problem.   Skin:  Negative for rash.   Musculoskeletal:  Negative for myalgias.   Gastrointestinal:  Negative for hematemesis, hematochezia, nausea and vomiting.   Genitourinary:  Negative for dysuria.   Neurological:  Negative for focal weakness and sensory change.   Psychiatric/Behavioral:  Negative for altered mental status.        Physical Exam  Vitals:    07/16/24 1316   BP: (!) 112/58   Pulse: 75     Wt Readings from Last 1 Encounters:   07/16/24 119.7 kg (263 lb 12.5 oz)     Physical Exam  HENT:      Head: Normocephalic and atraumatic.      Mouth/Throat:      Mouth: Mucous membranes are moist.   Eyes:      Extraocular Movements: Extraocular movements intact.      Pupils: Pupils are equal, round, and reactive to light.   Neck:      Vascular: No carotid bruit or JVD.   Cardiovascular:      Rate and Rhythm: Normal rate and regular rhythm.      Pulses: Normal pulses and intact distal pulses.      Heart sounds: Normal heart sounds. No murmur heard.     No friction rub. No gallop.   Pulmonary:      Effort: Pulmonary effort is normal.      Breath sounds: Normal breath sounds.   Abdominal:      Tenderness: There is no abdominal tenderness. There is no guarding or rebound.   Musculoskeletal:      Right lower leg: No edema.      Left lower leg: No edema.   Skin:     General:  Skin is warm and dry.      Capillary Refill: Capillary refill takes less than 2 seconds.   Neurological:      General: No focal deficit present.      Mental Status: He is alert and oriented to person, place, and time.   Psychiatric:         Mood and Affect: Mood normal.         Labs  Lab Visit on 02/04/2024   Component Date Value Ref Range Status    Sodium 02/04/2024 142  136 - 145 mmol/L Final    Potassium 02/04/2024 3.9  3.5 - 5.1 mmol/L Final    Chloride 02/04/2024 106  95 - 110 mmol/L Final    CO2 02/04/2024 26  23 - 29 mmol/L Final    Glucose 02/04/2024 94  70 - 110 mg/dL Final    BUN 02/04/2024 20  6 - 20 mg/dL Final    Creatinine 02/04/2024 1.1  0.5 - 1.4 mg/dL Final    Calcium 02/04/2024 9.2  8.7 - 10.5 mg/dL Final    Total Protein 02/04/2024 7.3  6.0 - 8.4 g/dL Final    Albumin 02/04/2024 3.8  3.5 - 5.2 g/dL Final    Total Bilirubin 02/04/2024 0.4  0.1 - 1.0 mg/dL Final    Comment: For infants and newborns, interpretation of results should be based  on gestational age, weight and in agreement with clinical  observations.    Premature Infant recommended reference ranges:  Up to 24 hours.............<8.0 mg/dL  Up to 48 hours............<12.0 mg/dL  3-5 days..................<15.0 mg/dL  6-29 days.................<15.0 mg/dL      Alkaline Phosphatase 02/04/2024 48 (L)  55 - 135 U/L Final    AST 02/04/2024 24  10 - 40 U/L Final    ALT 02/04/2024 33  10 - 44 U/L Final    eGFR 02/04/2024 >60.0  >60 mL/min/1.73 m^2 Final    Anion Gap 02/04/2024 10  8 - 16 mmol/L Final    Cholesterol 02/04/2024 158  120 - 199 mg/dL Final    Comment: The National Cholesterol Education Program (NCEP) has set the  following guidelines (reference ranges) for Cholesterol:  Optimal.....................<200 mg/dL  Borderline High.............200-239 mg/dL  High........................> or = 240 mg/dL      Triglycerides 02/04/2024 197 (H)  30 - 150 mg/dL Final    Comment: The National Cholesterol Education Program (NCEP) has set  the  following guidelines (reference values) for triglycerides:  Normal......................<150 mg/dL  Borderline High.............150-199 mg/dL  High........................200-499 mg/dL      HDL 02/04/2024 23 (L)  40 - 75 mg/dL Final    Comment: The National Cholesterol Education Program (NCEP) has set the  following guidelines (reference values) for HDL Cholesterol:  Low...............<40 mg/dL  Optimal...........>60 mg/dL      LDL Cholesterol 02/04/2024 95.6  63.0 - 159.0 mg/dL Final    Comment: The National Cholesterol Education Program (NCEP) has set the  following guidelines (reference values) for LDL Cholesterol:  Optimal.......................<130 mg/dL  Borderline High...............130-159 mg/dL  High..........................160-189 mg/dL  Very High.....................>190 mg/dL      HDL/Cholesterol Ratio 02/04/2024 14.6 (L)  20.0 - 50.0 % Final    Total Cholesterol/HDL Ratio 02/04/2024 6.9 (H)  2.0 - 5.0 Final    Non-HDL Cholesterol 02/04/2024 135  mg/dL Final    Comment: Risk category and Non-HDL cholesterol goals:  Coronary heart disease (CHD)or equivalent (10-year risk of CHD >20%):  Non-HDL cholesterol goal     <130 mg/dL  Two or more CHD risk factors and 10-year risk of CHD <= 20%:  Non-HDL cholesterol goal     <160 mg/dL  0 to 1 CHD risk factor:  Non-HDL cholesterol goal     <190 mg/dL      Prolactin 02/04/2024 15.5  3.5 - 19.4 ng/mL Final    Testosterone 02/04/2024 378  250 - 1100 ng/dL Final    Comment: Men age 18 or above, with clinically significant hypogonadal symptoms  and total testosterone values repeatedly in the range of 200-300 ng/dL  or less, may benefit from testosterone treatment after adequate risk  and benefits counseling.    For additional information, please refer to  http://education.[employer]/faq/TotalTestosteroneLCMSMS  (This link is being provided for informational/educational purposes  only.)    This test was developed and its analytical performance  characteristics  have been determined by [facility]. It has not been cleared or  approved by FDA. This assay has been validated pursuant to the CLIA  regulations and is used for clinical purposes.    Test Performed at:  [facility] [address]  [id number]     PRAMOD Suarez MD, PhD, NEIL      Testosterone, Free 02/04/2024 58.5  46.0 - 224.0 pg/mL Final    Comment: This test was developed and its analytical performance characteristics  have been determined by [facility]. It has not been cleared or  approved by FDA. This assay has been validated pursuant to the CLIA  regulations and is used for clinical purposes.      Testosterone, Bioavailable 02/04/2024 115.3  ng/dL Final    Comment: Reference Range:  110.0-575.0  Test Performed at:  [facility] [address]  [id number]     PRAMOD Suarez MD, PhD, NEIL      Sex Hormone Binding Globulin 02/04/2024 27  22 - 77 nmol/L Final    Comment: Test Performed at:  [facility] [address]  [id number]     PRAMOD Suarez MD, PhD, NEIL      Albumin 02/04/2024 4.3  3.6 - 5.1 g/dL Final    Comment: Test Performed at:  [facility] [address]  [id number]     PRAMOD Suarez MD, PhD, NEIL      WBC 02/04/2024 5.86  3.90 - 12.70 K/uL Final    RBC 02/04/2024 4.60  4.60 - 6.20 M/uL Final    Hemoglobin 02/04/2024 13.4 (L)  14.0 - 18.0 g/dL Final    Hematocrit 02/04/2024 43.1  40.0 - 54.0 % Final    MCV 02/04/2024 94  82 - 98 fL Final    MCH 02/04/2024 29.1  27.0 - 31.0 pg Final    MCHC 02/04/2024 31.1 (L)  32.0 - 36.0 g/dL Final    RDW 02/04/2024 14.4  11.5 - 14.5 % Final    Platelets 02/04/2024 210  150 - 450 K/uL Final    MPV 02/04/2024 11.9  9.2 - 12.9 fL Final    Immature Granulocytes 02/04/2024 0.5  0.0 - 0.5 % Final    Gran # (ANC) 02/04/2024 3.7  1.8 - 7.7  K/uL Final    Immature Grans (Abs) 02/04/2024 0.03  0.00 - 0.04 K/uL Final    Comment: Mild elevation in immature granulocytes is non specific and   can be seen in a variety of conditions including stress response,   acute inflammation, trauma and pregnancy. Correlation with other   laboratory and clinical findings is essential.      Lymph # 02/04/2024 1.6  1.0 - 4.8 K/uL Final    Mono # 02/04/2024 0.4  0.3 - 1.0 K/uL Final    Eos # 02/04/2024 0.2  0.0 - 0.5 K/uL Final    Baso # 02/04/2024 0.04  0.00 - 0.20 K/uL Final    nRBC 02/04/2024 0  0 /100 WBC Final    Gran % 02/04/2024 62.2  38.0 - 73.0 % Final    Lymph % 02/04/2024 26.5  18.0 - 48.0 % Final    Mono % 02/04/2024 6.5  4.0 - 15.0 % Final    Eosinophil % 02/04/2024 3.6  0.0 - 8.0 % Final    Basophil % 02/04/2024 0.7  0.0 - 1.9 % Final    Differential Method 02/04/2024 Automated   Final    Hemoglobin A1C 02/04/2024 5.2  4.0 - 5.6 % Final    Comment: ADA Screening Guidelines:  5.7-6.4%  Consistent with prediabetes  >or=6.5%  Consistent with diabetes    High levels of fetal hemoglobin interfere with the HbA1C  assay. Heterozygous hemoglobin variants (HbS, HgC, etc)do  not significantly interfere with this assay.   However, presence of multiple variants may affect accuracy.      Estimated Avg Glucose 02/04/2024 103  68 - 131 mg/dL Final       EKG  As above    Echo   Results for orders placed or performed in visit on 08/16/22   Echo   Result Value Ref Range    TDI SEPTAL 0.06 m/s    LV LATERAL E/E' RATIO 5.10 m/s    LV SEPTAL E/E' RATIO 8.50 m/s    Left Ventricular Outflow Tract Mean Velocity 0.81 cm/s    Left Ventricular Outflow Tract Mean Gradient 2.94 mmHg    AORTIC VALVE CUSP SEPERATION [id number] cm    TDI LATERAL 0.10 m/s    PV PEAK VELOCITY 1.15 cm/s    LVIDd 4.13 3.5 - 6.0 cm    IVS 1.28 (A) 0.6 - 1.1 cm    Posterior Wall 1.18 (A) 0.6 - 1.1 cm    Ao root annulus 2.85 cm    LVIDs 3.30 2.1 - 4.0 cm    FS 20 28 - 44 %    Sinus 2.70 cm    Ascending aorta  2.93 cm    LV mass 180.10 g    RVDD 2.53 cm    Left Ventricle Relative Wall Thickness 0.57 cm    AV mean gradient 5 mmHg    AV valve area 1.57 cm2    AV Velocity Ratio 0.76     AV index (prosthetic) 0.71     MV valve area p 1/2 method 3.18 cm2    E/A ratio 0.72     Mean e' 0.08 m/s    E wave deceleration time 223.20 msec    IVRT 57.09 msec    LVOT diameter 1.68 cm    LVOT area 2.2 cm2    LVOT peak danial 1.12 m/s    LVOT peak VTI 21.10 cm    Ao peak danial 1.48 m/s    Ao VTI 29.7 cm    LVOT stroke volume 46.75 cm3    AV peak gradient 9 mmHg    E/E' ratio 6.38 m/s    MV Peak E Danial 0.51 m/s    TR Max Danial 2.67 m/s    MV stenosis pressure 1/2 time 69.23 ms    MV Peak A Danial 0.71 m/s    LV Systolic Volume 43.98 mL    LV Diastolic Volume 75.52 mL    RA Major Axis 4.02 cm    Left Atrium Minor Axis 2.72 cm    Left Atrium Major Axis 5.32 cm    Triscuspid Valve Regurgitation Peak Gradient 29 mmHg    LA volume (mod) 0.00 cm3    RA Width 3.02 cm    EF 40 %    Narrative    · The left ventricle is normal in size with mild concentric hypertrophy   and mildly decreased systolic function.  · The estimated ejection fraction is 40%.  · Grade I left ventricular diastolic dysfunction.  · Normal right ventricular size with normal right ventricular systolic   function.  · There are segmental left ventricular wall motion abnormalities.  · There is focal anteroseptal akinesis and apical dyskinesis consistent   with an infarct          Imaging  No results found.    Prior coronary angiogram / intervention:  As above    Assessment and Plan  1. Hypertension, unspecified type  Continue lisinopril to metoprolol   Titrate beta-blocker replace alfuzosin with Flomax (will affect blood pressure less)    2. Pure hypercholesterolemia  Continue Crestor repeat LDL goal less than 70    3. Aortic atherosclerosis  Statin    4. HFmrEF  Continue beta-blocker and lisinopril  Titrating beta-blocker  Euvolemic  Repeat echocardiogram next visit  Wt Readings from Last 3  Encounters:   07/16/24 1316 119.7 kg (263 lb 12.5 oz)   04/09/24 1307 116.1 kg (255 lb 15.3 oz)   02/06/24 0857 117.1 kg (258 lb 4.3 oz)        6. Atherosclerosis of native coronary artery of native heart with angina pectoris  Continue Crestor and aspirin with beta-blocker  Titrate beta-blocker replace alfuzosin with Flomax      Follow Up  3 months  1 week nurse visit blood pressure check  Echo next visit    Flavio Rosario MD, [facility], [facility]  Interventional Cardiology     Total professional time spent for the encounter: 40 minutes  Time was spent preparing to see the patient, reviewing results of prior testing, obtaining and/or reviewing separately obtained history, performing a medically appropriate examination and interview, counseling and educating the patient/family, ordering medications/tests/procedures, referring and communicating with other health care professionals, documenting clinical information in the electronic health record, and independently interpreting results..ion

## 2024-08-05 ENCOUNTER — PATIENT MESSAGE (OUTPATIENT)
Dept: NEUROSURGERY | Facility: CLINIC | Age: 59
End: 2024-08-05
Payer: MEDICARE

## 2024-08-10 ENCOUNTER — PATIENT MESSAGE (OUTPATIENT)
Dept: NEUROSURGERY | Facility: CLINIC | Age: 59
End: 2024-08-10
Payer: MEDICARE

## 2024-09-18 ENCOUNTER — OFFICE VISIT (OUTPATIENT)
Dept: PRIMARY CARE CLINIC | Facility: CLINIC | Age: 59
End: 2024-09-18
Payer: MEDICARE

## 2024-09-18 VITALS
RESPIRATION RATE: 18 BRPM | WEIGHT: 268.94 LBS | BODY MASS INDEX: 39.83 KG/M2 | DIASTOLIC BLOOD PRESSURE: 76 MMHG | OXYGEN SATURATION: 97 % | HEART RATE: 85 BPM | SYSTOLIC BLOOD PRESSURE: 112 MMHG | HEIGHT: 69 IN

## 2024-09-18 DIAGNOSIS — E23.7 PITUITARY LESION: ICD-10-CM

## 2024-09-18 DIAGNOSIS — I50.32 CHRONIC HEART FAILURE WITH PRESERVED EJECTION FRACTION: ICD-10-CM

## 2024-09-18 DIAGNOSIS — M96.1 POST LAMINECTOMY SYNDROME: ICD-10-CM

## 2024-09-18 DIAGNOSIS — I25.10 ATHEROSCLEROSIS OF NATIVE CORONARY ARTERY OF NATIVE HEART WITHOUT ANGINA PECTORIS: ICD-10-CM

## 2024-09-18 DIAGNOSIS — F41.9 ANXIETY: ICD-10-CM

## 2024-09-18 DIAGNOSIS — G89.4 CHRONIC PAIN SYNDROME: ICD-10-CM

## 2024-09-18 DIAGNOSIS — E78.00 PURE HYPERCHOLESTEROLEMIA: ICD-10-CM

## 2024-09-18 DIAGNOSIS — I10 HYPERTENSION, UNSPECIFIED TYPE: ICD-10-CM

## 2024-09-18 DIAGNOSIS — Z98.1 HISTORY OF LUMBAR FUSION: ICD-10-CM

## 2024-09-18 DIAGNOSIS — G47.10 HYPERSOMNOLENCE: Primary | ICD-10-CM

## 2024-09-18 DIAGNOSIS — M50.30 DDD (DEGENERATIVE DISC DISEASE), CERVICAL: ICD-10-CM

## 2024-09-18 DIAGNOSIS — R79.89 ELEVATED PROLACTIN LEVEL: ICD-10-CM

## 2024-09-18 DIAGNOSIS — M51.36 DDD (DEGENERATIVE DISC DISEASE), LUMBAR: ICD-10-CM

## 2024-09-18 DIAGNOSIS — N39.3 STRESS INCONTINENCE OF URINE: ICD-10-CM

## 2024-09-18 DIAGNOSIS — F32.0 CURRENT MILD EPISODE OF MAJOR DEPRESSIVE DISORDER WITHOUT PRIOR EPISODE: ICD-10-CM

## 2024-09-18 DIAGNOSIS — N40.0 BENIGN PROSTATIC HYPERPLASIA, UNSPECIFIED WHETHER LOWER URINARY TRACT SYMPTOMS PRESENT: ICD-10-CM

## 2024-09-18 DIAGNOSIS — D35.2 HYPERPROLACTINOMA: ICD-10-CM

## 2024-09-18 DIAGNOSIS — Z79.899 ENCOUNTER FOR LONG-TERM CURRENT USE OF MEDICATION: ICD-10-CM

## 2024-09-18 DIAGNOSIS — M54.16 CHRONIC LUMBAR RADICULOPATHY: ICD-10-CM

## 2024-09-18 DIAGNOSIS — R53.82 CHRONIC FATIGUE: ICD-10-CM

## 2024-09-18 PROCEDURE — 4010F ACE/ARB THERAPY RXD/TAKEN: CPT | Mod: HCNC,CPTII,S$GLB, | Performed by: FAMILY MEDICINE

## 2024-09-18 PROCEDURE — 3074F SYST BP LT 130 MM HG: CPT | Mod: HCNC,CPTII,S$GLB, | Performed by: FAMILY MEDICINE

## 2024-09-18 PROCEDURE — 3008F BODY MASS INDEX DOCD: CPT | Mod: HCNC,CPTII,S$GLB, | Performed by: FAMILY MEDICINE

## 2024-09-18 PROCEDURE — 3078F DIAST BP <80 MM HG: CPT | Mod: HCNC,CPTII,S$GLB, | Performed by: FAMILY MEDICINE

## 2024-09-18 PROCEDURE — 3044F HG A1C LEVEL LT 7.0%: CPT | Mod: HCNC,CPTII,S$GLB, | Performed by: FAMILY MEDICINE

## 2024-09-18 PROCEDURE — 1159F MED LIST DOCD IN RCRD: CPT | Mod: HCNC,CPTII,S$GLB, | Performed by: FAMILY MEDICINE

## 2024-09-18 PROCEDURE — 99999 PR PBB SHADOW E&M-EST. PATIENT-LVL III: CPT | Mod: PBBFAC,HCNC,, | Performed by: FAMILY MEDICINE

## 2024-09-18 PROCEDURE — 99214 OFFICE O/P EST MOD 30 MIN: CPT | Mod: HCNC,S$GLB,, | Performed by: FAMILY MEDICINE

## 2024-09-18 RX ORDER — ALBUTEROL SULFATE 90 UG/1
2 INHALANT RESPIRATORY (INHALATION) EVERY 4 HOURS PRN
Qty: 18 G | Refills: 5 | Status: SHIPPED | OUTPATIENT
Start: 2024-09-18

## 2024-09-18 RX ORDER — ERGOCALCIFEROL 1.25 MG/1
50000 CAPSULE ORAL
Qty: 12 CAPSULE | Refills: 3 | Status: SHIPPED | OUTPATIENT
Start: 2024-09-18

## 2024-09-18 RX ORDER — LISINOPRIL 2.5 MG/1
2.5 TABLET ORAL DAILY
Qty: 90 TABLET | Refills: 3 | Status: SHIPPED | OUTPATIENT
Start: 2024-09-18 | End: 2025-09-18

## 2024-09-18 RX ORDER — TAMSULOSIN HYDROCHLORIDE 0.4 MG/1
0.4 CAPSULE ORAL DAILY
Qty: 90 CAPSULE | Refills: 1 | Status: SHIPPED | OUTPATIENT
Start: 2024-09-18 | End: 2025-09-18

## 2024-09-18 RX ORDER — ASPIRIN 81 MG/1
81 TABLET ORAL DAILY
Qty: 90 TABLET | Refills: 3 | Status: SHIPPED | OUTPATIENT
Start: 2024-09-18 | End: 2025-09-18

## 2024-09-18 RX ORDER — CABERGOLINE 0.5 MG/1
0.25 TABLET ORAL
Qty: 18 TABLET | Refills: 2 | Status: SHIPPED | OUTPATIENT
Start: 2024-09-18 | End: 2025-09-18

## 2024-09-18 RX ORDER — MELOXICAM 7.5 MG/1
7.5 TABLET ORAL DAILY
Qty: 30 TABLET | Refills: 5 | Status: SHIPPED | OUTPATIENT
Start: 2024-09-18

## 2024-09-18 RX ORDER — FENOFIBRATE 145 MG/1
145 TABLET, FILM COATED ORAL DAILY
Qty: 90 TABLET | Refills: 3 | Status: SHIPPED | OUTPATIENT
Start: 2024-09-18

## 2024-09-18 RX ORDER — ROSUVASTATIN CALCIUM 40 MG/1
40 TABLET, COATED ORAL NIGHTLY
Qty: 90 TABLET | Refills: 3 | Status: SHIPPED | OUTPATIENT
Start: 2024-09-18 | End: 2025-09-18

## 2024-09-18 RX ORDER — METOPROLOL SUCCINATE 50 MG/1
50 TABLET, EXTENDED RELEASE ORAL DAILY
Qty: 90 TABLET | Refills: 1 | Status: SHIPPED | OUTPATIENT
Start: 2024-09-18 | End: 2025-09-18

## 2024-09-18 RX ORDER — SERTRALINE HYDROCHLORIDE 100 MG/1
100 TABLET, FILM COATED ORAL DAILY
Qty: 90 TABLET | Refills: 3 | Status: SHIPPED | OUTPATIENT
Start: 2024-09-18

## 2024-09-18 NOTE — PROGRESS NOTES
Subjective:       Patient ID: Collin Almaguer Sr. is a 59 y.o. male.    Chief Complaint: 6 month check up        HPI 60 yo WM in for six-month checkup eating well--+BM--ambulating well  MRI brain--right pituitary lesion 7 mm anterior pituitary area--when stands body feels off balance sees Dr Basurto --took medication but lesion same size.  Was seeing endocrinologist Dr Robles moved to get new endocrinologist   Has pain management doctor--discussed MRI but to much hardware--was suppose get CT scan yearly see if tumor grows  Hyperlipidemia on atorvastatin--told stop atorvastatin --put on crestor   BPH on flomax   Hypertension blood pressure 112/76 on Toprol increased 25 mg to 50 m=g  Hx MI on baby ASA and Imdur   History depression on Zoloft   history of bronchospasm on albuterol  Hx Dr Parks not on testosterone not seen him in awhile due to monitoring of the pituitary lesion  Patient states continually falling asleep--sometimes for 1-2 hours--was supposed to have sleep study but never did it worried may have narcolepsy       ROS:  No significant change except for history of present illness  Skin: no psoriasis, eczema, skin cancer--Skin Cancer left cheek--Dr Wise---was sent to a place where skin cancer was removed  HEENT: +occs  headache see HPI --pituitary tumor--still having headache ,no  ocular pain, blurred vision, diplopia, epistaxis, hoarseness change in voice, thyroid trouble +glaucoma itis bilaterally left greater than right  Lung: No pneumonia, +asthma as child ,no  Tb, wheezing, SOB, no smoking occasional bronchospasm  Heart: no chest pain,no ankle edema, palpitations, MI, tiffanie murmur,+hypertension,+ hyperlipidemia--no stent bypass arrhythmia--never seen by cardiologist   Abdomen: no nausea, no  vomiting, diarrhea,+ constipation-a lot better no  ulcers, hepatitis, gallbladder disease, melena, hematochezia, hematemesis   : no UTI, renal disease, stones prostate  MS: no fractures, O/A, lupus,  rheumatoid, gout--neck and back problem--old xray  OK --laminectomy and rods hips --last fall patient gel over pile of debris in yd 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  Neuro: no  Dizziness, no  LOC, seizures   No diabetes, no anemia, +anxiety, + depression on meds Disabled  --being home all the time makes patient depressed--found father is at home  2014  , 5 children, disability lives alone        Objective:   Physical Exam:  General: Well nourished, well developed, no acute distress + morbid obesity  Skin:  No lesion   HEENT: Eyes PERRLA, EOM intact, nose patent, throat non-erythematous ears TMs clear  NECK: Supple, no bruits, No JVD, no nodes   Lungs: Clear, no rales, rhonchi, wheezing  Heart: Regular rate and rhythm, no murmurs, gallops, or rubs  Abdomen: flat, bowel sounds positive, no tenderness, or organomegaly some slight ventral herniation--mild--some complaints of right costal angle swelling that comes and goes suggestive of hernia   MS:-- tenderness lumbar spine anterior flexion 10° extension 10° lateral flexion rotation 10°--walks with wide-based gait--intermittent numbness left foot  Neuro: Alert, CN intact, oriented X 3 Romberg negative heel-toe slight swaying due back issues   Extremities: No cyanosis, clubbing, or edema         Assessment:       1. Hypersomnolence    2. Atherosclerosis of native coronary artery of native heart without angina pectoris    3. Elevated prolactin level    4. Hypertension, unspecified type    5. Chronic heart failure with preserved ejection fraction    6. Pure hypercholesterolemia    7. Benign prostatic hyperplasia, unspecified whether lower urinary tract symptoms present    8. Chronic fatigue    9. DDD (degenerative disc disease), cervical    10. DDD (degenerative disc disease), lumbar    11. Chronic lumbar radiculopathy    12. Post laminectomy syndrome    13. Stress incontinence of urine    14. Chronic pain syndrome    15.  Anxiety    16. Current mild episode of major depressive disorder without prior episode    17. History of lumbar fusion    18. Pituitary lesion    19. Hyperprolactinoma    20. Encounter for long-term current use of medication            Plan:       Hypersomnolence  -     Polysomnogram (CPAP will be added if patient meets diagnostic criteria.); Future  -     Ambulatory referral/consult to Neurology; Future; Expected date: 09/25/2024    Atherosclerosis of native coronary artery of native heart without angina pectoris  -     aspirin (ECOTRIN) 81 MG EC tablet; Take 1 tablet (81 mg total) by mouth once daily.  Dispense: 90 tablet; Refill: 3  -     rosuvastatin (CRESTOR) 40 MG Tab; Take 1 tablet (40 mg total) by mouth every evening.  Dispense: 90 tablet; Refill: 3    Elevated prolactin level  -     cabergoline (DOSTINEX) 0.5 mg tablet; Take 0.5 tablets (0.25 mg total) by mouth 3 (three) times a week.  Dispense: 18 tablet; Refill: 2    Hypertension, unspecified type  -     lisinopriL (PRINIVIL,ZESTRIL) 2.5 MG tablet; Take 1 tablet (2.5 mg total) by mouth once daily.  Dispense: 90 tablet; Refill: 3  -     CBC Auto Differential; Future; Expected date: 09/18/2024  -     Comprehensive Metabolic Panel; Future; Expected date: 09/18/2024  -     Hemoglobin A1C; Future; Expected date: 09/18/2024    Chronic heart failure with preserved ejection fraction  -     metoprolol succinate (TOPROL-XL) 50 MG 24 hr tablet; Take 1 tablet (50 mg total) by mouth once daily.  Dispense: 90 tablet; Refill: 1    Pure hypercholesterolemia  -     rosuvastatin (CRESTOR) 40 MG Tab; Take 1 tablet (40 mg total) by mouth every evening.  Dispense: 90 tablet; Refill: 3  -     Lipid Panel; Future; Expected date: 09/18/2024    Benign prostatic hyperplasia, unspecified whether lower urinary tract symptoms present  -     tamsulosin (FLOMAX) 0.4 mg Cap; Take 1 capsule (0.4 mg total) by mouth once daily.  Dispense: 90 capsule; Refill: 1    Chronic fatigue  -     T4,  Free; Future; Expected date: 09/18/2024  -     TSH; Future; Expected date: 09/18/2024  -     Polysomnogram (CPAP will be added if patient meets diagnostic criteria.); Future  -     Ambulatory referral/consult to Neurology; Future; Expected date: 09/25/2024    DDD (degenerative disc disease), cervical    DDD (degenerative disc disease), lumbar    Chronic lumbar radiculopathy    Post laminectomy syndrome    Stress incontinence of urine    Chronic pain syndrome    Anxiety    Current mild episode of major depressive disorder without prior episode    History of lumbar fusion    Pituitary lesion  -     PROLACTIN; Future; Expected date: 09/18/2024    Hyperprolactinoma    Encounter for long-term current use of medication  -     Hemoglobin A1C; Future; Expected date: 09/18/2024    Other orders  -     albuterol (PROVENTIL/VENTOLIN HFA) 90 mcg/actuation inhaler; Inhale 2 puffs into the lungs every 4 (four) hours as needed. Rescue  Dispense: 18 g; Refill: 5  -     ergocalciferol (ERGOCALCIFEROL) 50,000 unit Cap; Take 1 capsule (50,000 Units total) by mouth every 7 days.  Dispense: 12 capsule; Refill: 3  -     fenofibrate (TRICOR) 145 MG tablet; Take 1 tablet (145 mg total) by mouth once daily.  Dispense: 90 tablet; Refill: 3  -     meloxicam (MOBIC) 7.5 MG tablet; Take 1 tablet (7.5 mg total) by mouth once daily.  Dispense: 30 tablet; Refill: 5  -     sertraline (ZOLOFT) 100 MG tablet; Take 1 tablet (100 mg total) by mouth once daily.  Dispense: 90 tablet; Refill: 3            Multiple medical issues --    MRI the brain--due to headaches--7 mm anterior pituitary tumor--patient was txed with medication x 6 mo --lesion same size told redo MRI one year check size tumor   Numbness in the left foot--intermittent bilateral foot pain--needs to see Dr. Alatorre--Neuro surgery--history laminectomy chronic back pain   Low testosterone patient needs to see Urology Dr Parks --but told wait until pituitary workup complete   Skin lesion  right side of the nose with increased vasculature see Dr. Wise sent pt to surgeon for surgical removal   MRI lumbar spine showed fusion L3-S1 with laminectomy/L1 to with some moderate to severe cord compression due to bulging disc and facet hyper  Chronic back pain--goes to chronic pain clinic---x-ray reviewed from 2020 of pelvic--shows lumbar fusion--with laminectomy--with rods going to both hips--patient states had recent x-ray had diagnostic imaging to be reviewed by Neuro surgery  Morbid obesity--exercise trying get down to ideal body weight--could consider gastric sleeve or bypass --patient was asking me for Adipex told I do not write it ecent   Multiple other medical issues not addressed this visit  Depression--needs to see a psychiatrist--multiple issues that need to be addressed should be on medication for depression--needs to see psychiatrist--if on SSRI such as Lexapro initially would be helpful with weight but in time but making gain weight may benefit from Wellbutrin  Hyperlipidemia patient on Lipitor tri cor  Hypertension/hyperlipidemia metoprolol 25 mg  History vitamin-D deficiency on vitamin-D  Lab  CBCs CMP lipid free and total testosterone prolactin level  in 6 mo   Needs to see neurosurgeon to evaluate lower back  Appt cardiology Dr Lopez for chest pain history angiogram 2022 with a block vessel with collateral circulation some atypical chest pain Had EKG Jan 2024 nonspecific ST wave changes   Health maintenance see she

## 2024-09-19 ENCOUNTER — PATIENT MESSAGE (OUTPATIENT)
Dept: PRIMARY CARE CLINIC | Facility: CLINIC | Age: 59
End: 2024-09-19
Payer: MEDICARE

## 2024-10-10 ENCOUNTER — PATIENT MESSAGE (OUTPATIENT)
Dept: CARDIOLOGY | Facility: CLINIC | Age: 59
End: 2024-10-10
Payer: MEDICARE

## 2024-12-04 ENCOUNTER — TELEPHONE (OUTPATIENT)
Dept: CARDIOLOGY | Facility: CLINIC | Age: 59
End: 2024-12-04
Payer: MEDICARE

## 2024-12-04 NOTE — TELEPHONE ENCOUNTER
----- Message from Alexa sent at 12/4/2024  1:36 PM CST -----  Regarding: (2 tests to be done)  Contact: Patient can be contacted @# [phone]  PT called asking about 2 tests he is needing to have done. PT was told that  he had 2 tests that were needing to be done but in the hospital - PT feels was from cardiology. Please advise    Patient can be contacted @# [phone]

## 2024-12-06 ENCOUNTER — TELEPHONE (OUTPATIENT)
Dept: CARDIOLOGY | Facility: CLINIC | Age: 59
End: 2024-12-06
Payer: MEDICARE

## 2024-12-06 NOTE — TELEPHONE ENCOUNTER
----- Message from Prabha sent at 12/6/2024  1:11 PM CST -----  Contact: pt @ [phone]  RODNEY BLUE calling regarding Patient Advice (message) for #pt returning call from Cindy, asking for call back

## 2024-12-06 NOTE — TELEPHONE ENCOUNTER
Spoke with patient, informed that it appears he no showed for an echocardiogram in January 2024 ordered by CAROLINA Mohan.  At his follow up in July 2024 with Dr. Rosario, no new orders for outpatient testing.  Informed that echocardiogram order is no longer active, therefore, I cannot reschedule the appointment.  Informed that he would need to wait until upcoming appointment with ARVIND Ontiveros on 01/09/2025 to discuss the need for any outpatient testing.  Patient verbalized understanding.

## 2025-01-09 ENCOUNTER — OFFICE VISIT (OUTPATIENT)
Dept: CARDIOLOGY | Facility: CLINIC | Age: 60
End: 2025-01-09
Payer: MEDICARE

## 2025-01-09 VITALS
HEIGHT: 69 IN | HEART RATE: 98 BPM | SYSTOLIC BLOOD PRESSURE: 122 MMHG | BODY MASS INDEX: 42.01 KG/M2 | DIASTOLIC BLOOD PRESSURE: 70 MMHG | WEIGHT: 283.63 LBS | OXYGEN SATURATION: 96 %

## 2025-01-09 DIAGNOSIS — I70.0 AORTIC ATHEROSCLEROSIS: ICD-10-CM

## 2025-01-09 DIAGNOSIS — E66.01 MORBID (SEVERE) OBESITY DUE TO EXCESS CALORIES: Primary | ICD-10-CM

## 2025-01-09 DIAGNOSIS — E78.00 PURE HYPERCHOLESTEROLEMIA: ICD-10-CM

## 2025-01-09 DIAGNOSIS — I50.20 HFREF (HEART FAILURE WITH REDUCED EJECTION FRACTION): ICD-10-CM

## 2025-01-09 DIAGNOSIS — I10 PRIMARY HYPERTENSION: ICD-10-CM

## 2025-01-09 DIAGNOSIS — I50.32 CHRONIC HEART FAILURE WITH PRESERVED EJECTION FRACTION: ICD-10-CM

## 2025-01-09 DIAGNOSIS — I25.10 ATHEROSCLEROSIS OF NATIVE CORONARY ARTERY OF NATIVE HEART WITHOUT ANGINA PECTORIS: ICD-10-CM

## 2025-01-09 PROCEDURE — 99215 OFFICE O/P EST HI 40 MIN: CPT | Mod: HCNC,S$GLB,,

## 2025-01-09 PROCEDURE — 4010F ACE/ARB THERAPY RXD/TAKEN: CPT | Mod: HCNC,CPTII,S$GLB,

## 2025-01-09 PROCEDURE — 3074F SYST BP LT 130 MM HG: CPT | Mod: HCNC,CPTII,S$GLB,

## 2025-01-09 PROCEDURE — 1159F MED LIST DOCD IN RCRD: CPT | Mod: HCNC,CPTII,S$GLB,

## 2025-01-09 PROCEDURE — G2211 COMPLEX E/M VISIT ADD ON: HCPCS | Mod: HCNC,S$GLB,,

## 2025-01-09 PROCEDURE — 3008F BODY MASS INDEX DOCD: CPT | Mod: HCNC,CPTII,S$GLB,

## 2025-01-09 PROCEDURE — 3078F DIAST BP <80 MM HG: CPT | Mod: HCNC,CPTII,S$GLB,

## 2025-01-09 PROCEDURE — 99999 PR PBB SHADOW E&M-EST. PATIENT-LVL IV: CPT | Mod: PBBFAC,HCNC,,

## 2025-01-09 RX ORDER — METOPROLOL SUCCINATE 100 MG/1
100 TABLET, EXTENDED RELEASE ORAL DAILY
Qty: 30 TABLET | Refills: 1 | Status: SHIPPED | OUTPATIENT
Start: 2025-01-09 | End: 2025-03-10

## 2025-01-09 RX ORDER — EZETIMIBE 10 MG/1
10 TABLET ORAL DAILY
Qty: 90 TABLET | Refills: 3 | Status: SHIPPED | OUTPATIENT
Start: 2025-01-09 | End: 2026-01-09

## 2025-01-09 NOTE — PROGRESS NOTES
[facility] [address]  Cardiology Clinic Note      Chief Complaint  Chief Complaint   Patient presents with    Follow-up       HPI:    Mr. Almaguer is a 59-year-old male with past medical history of  hypertension, hyperlipidemia, Holter 2022 rare PVCs occasional PACs, coronary angiogram 2022 LAD  with left-to-left collaterals, HFmrEF echo 2022 EF estimated 40% grade 1 diastolic dysfunction regional wall motion abnormalities in the LAD territory normal RV Carotid ultrasound 2022 no hemodynamically significant stenosis     Patient is new to me and here to establish care   Previously seen by Dr. Rosario  Here for a follow-up from last cardiology office visit  Doing well medication  Asymptomatic from a cardiac standpoint     EKG 01/2024 normal sinus rhythm nonspecific ST-T changes left atrial enlargement    Medications  Current Outpatient Medications   Medication Sig Dispense Refill    aspirin (ECOTRIN) 81 MG EC tablet Take 1 tablet (81 mg total) by mouth once daily. 90 tablet 3    cabergoline (DOSTINEX) 0.5 mg tablet Take 0.5 tablets (0.25 mg total) by mouth 3 (three) times a week. 18 tablet 2    cyclobenzaprine (FLEXERIL) 10 MG tablet Take 1 tablet (10 mg total) by mouth nightly as needed for Muscle spasms. 30 tablet 2    ergocalciferol (ERGOCALCIFEROL) 50,000 unit Cap Take 1 capsule (50,000 Units total) by mouth every 7 days. 12 capsule 3    fenofibrate (TRICOR) 145 MG tablet Take 1 tablet (145 mg total) by mouth once daily. 90 tablet 3    gabapentin (NEURONTIN) 800 MG tablet Take 1 tablet (800 mg total) by mouth 2 (two) times daily. 60 tablet 5    lisinopriL (PRINIVIL,ZESTRIL) 2.5 MG tablet Take 1 tablet (2.5 mg total) by mouth once daily. 90 tablet 3    meloxicam (MOBIC) 7.5 MG tablet Take 1 tablet (7.5 mg total) by mouth once daily. 30 tablet 5    omega-3 acid ethyl esters (LOVAZA) 1 gram capsule Take 2 capsules by mouth twice daily 360 capsule 2    rosuvastatin (CRESTOR) 40 MG Tab Take 1 tablet (40  mg total) by mouth every evening. 90 tablet 3    sertraline (ZOLOFT) 100 MG tablet Take 1 tablet (100 mg total) by mouth once daily. 90 tablet 3    tamsulosin (FLOMAX) 0.4 mg Cap Take 1 capsule (0.4 mg total) by mouth once daily. 90 capsule 1    albuterol (PROVENTIL/VENTOLIN HFA) 90 mcg/actuation inhaler Inhale 2 puffs into the lungs every 4 (four) hours as needed. Rescue 18 g 5    ezetimibe (ZETIA) 10 mg tablet Take 1 tablet (10 mg total) by mouth once daily. 90 tablet 3    latanoprost 0.005 % ophthalmic solution Place 1 drop into both eyes every evening.      metoprolol succinate (TOPROL-XL) 100 MG 24 hr tablet Take 1 tablet (100 mg total) by mouth once daily. 30 tablet 1     No current facility-administered medications for this visit.        History  Past Medical History:   Diagnosis Date    Atherosclerosis of native coronary artery of native heart without angina pectoris 4/9/2024    Cancer     Had a tumor removed in the back    Depression     Hyperlipemia     Hypertension     Low testosterone     Vitamin D deficiency      Past Surgical History:   Procedure Laterality Date    BACK SURGERY      November 2008 in [place]    BASAL CELL CARCINOMA EXCISION      left side of lip/cheek    COLONOSCOPY N/A 01/04/2021    Procedure: COLONOSCOPY;  Surgeon: Eriberto Ladd MD;  Location: [facility] ENDO;  Service: Endoscopy;  Laterality: N/A;    COLONOSCOPY W/ POLYPECTOMY  01/04/2021    colonoscopy w/ polypectomy per  01/04/2021    CORONARY ANGIOGRAPHY  09/26/2022    CORONARY ANGIOGRAPHY Right 09/26/2022    Procedure: ANGIOGRAM, CORONARY ARTERY;  Surgeon: Anam Molina MD;  Location: [facility] CATH LAB;  Service: Cardiology;  Laterality: Right;    HERNIA REPAIR      LAMINECTOMY      PROSTATE BIOPSY      TONSILLECTOMY      1974     Social History     Socioeconomic History    Marital status: Legally    Tobacco Use    Smoking status: Never    Smokeless tobacco: Never   Substance and Sexual Activity    Alcohol  use: No    Drug use: No     Family History   Problem Relation Name Age of Onset    Suicide Father      Heart disease Maternal Grandmother      Heart disease Maternal Grandfather          Allergies  Review of patient's allergies indicates:   Allergen Reactions    Fish containing products Hives     TUNA FISH      Pcn [penicillins] Itching       Review of Systems   Review of Systems   Constitutional: Negative for chills, decreased appetite, diaphoresis, fever, malaise/fatigue, weight gain and weight loss.   Eyes:  Negative for blurred vision.   Cardiovascular:  Negative for chest pain, claudication, dyspnea on exertion, irregular heartbeat, leg swelling, near-syncope, orthopnea, palpitations, paroxysmal nocturnal dyspnea and syncope.   Respiratory:  Negative for cough, shortness of breath, snoring, sputum production and wheezing.    Endocrine: Negative for cold intolerance, heat intolerance, polydipsia, polyphagia and polyuria.   Skin:  Negative for color change, dry skin, itching, nail changes and poor wound healing.   Musculoskeletal:  Negative for back pain, gout, joint pain and joint swelling.   Gastrointestinal:  Negative for bloating, abdominal pain, constipation, diarrhea, hematemesis, hematochezia, melena, nausea and vomiting.   Genitourinary:  Negative for dysuria and hematuria.   Neurological:  Negative for dizziness, headaches, light-headedness, numbness, paresthesias and weakness.   Psychiatric/Behavioral:  Negative for altered mental status, depression and memory loss.        Physical Exam  Vitals:    01/09/25 1133   BP: 122/70   Pulse: 98     Wt Readings from Last 1 Encounters:   01/16/25 128.4 kg (283 lb)     Physical Exam  Constitutional:       Appearance: He is obese.   HENT:      Head: Normocephalic and atraumatic.      Mouth/Throat:      Mouth: Mucous membranes are moist.   Eyes:      Extraocular Movements: Extraocular movements intact.      Pupils: Pupils are equal, round, and reactive to light.    Neck:      Vascular: No carotid bruit.   Cardiovascular:      Heart sounds: No murmur heard.     No friction rub. No gallop.   Pulmonary:      Effort: Pulmonary effort is normal. No respiratory distress.   Abdominal:      General: Abdomen is flat.      Palpations: Abdomen is soft.   Musculoskeletal:      Right lower leg: No edema.      Left lower leg: No edema.   Skin:     General: Skin is warm.      Capillary Refill: Capillary refill takes less than 2 seconds.   Neurological:      General: No focal deficit present.      Mental Status: He is alert.   Psychiatric:         Mood and Affect: Mood normal.       Labs  No visits with results within 6 Month(s) from this visit.   Latest known visit with results is:   Lab Visit on 02/04/2024   Component Date Value Ref Range Status    Sodium 02/04/2024 142  136 - 145 mmol/L Final    Potassium 02/04/2024 3.9  3.5 - 5.1 mmol/L Final    Chloride 02/04/2024 106  95 - 110 mmol/L Final    CO2 02/04/2024 26  23 - 29 mmol/L Final    Glucose 02/04/2024 94  70 - 110 mg/dL Final    BUN 02/04/2024 20  6 - 20 mg/dL Final    Creatinine 02/04/2024 1.1  0.5 - 1.4 mg/dL Final    Calcium 02/04/2024 9.2  8.7 - 10.5 mg/dL Final    Total Protein 02/04/2024 7.3  6.0 - 8.4 g/dL Final    Albumin 02/04/2024 3.8  3.5 - 5.2 g/dL Final    Total Bilirubin 02/04/2024 0.4  0.1 - 1.0 mg/dL Final    Comment: For infants and newborns, interpretation of results should be based  on gestational age, weight and in agreement with clinical  observations.    Premature Infant recommended reference ranges:  Up to 24 hours.............<8.0 mg/dL  Up to 48 hours............<12.0 mg/dL  3-5 days..................<15.0 mg/dL  6-29 days.................<15.0 mg/dL      Alkaline Phosphatase 02/04/2024 48 (L)  55 - 135 U/L Final    AST 02/04/2024 24  10 - 40 U/L Final    ALT 02/04/2024 33  10 - 44 U/L Final    eGFR 02/04/2024 >60.0  >60 mL/min/1.73 m^2 Final    Anion Gap 02/04/2024 10  8 - 16 mmol/L Final    Cholesterol  02/04/2024 158  120 - 199 mg/dL Final    Comment: The National Cholesterol Education Program (NCEP) has set the  following guidelines (reference ranges) for Cholesterol:  Optimal.....................<200 mg/dL  Borderline High.............200-239 mg/dL  High........................> or = 240 mg/dL      Triglycerides 02/04/2024 197 (H)  30 - 150 mg/dL Final    Comment: The National Cholesterol Education Program (NCEP) has set the  following guidelines (reference values) for triglycerides:  Normal......................<150 mg/dL  Borderline High.............150-199 mg/dL  High........................200-499 mg/dL      HDL 02/04/2024 23 (L)  40 - 75 mg/dL Final    Comment: The National Cholesterol Education Program (NCEP) has set the  following guidelines (reference values) for HDL Cholesterol:  Low...............<40 mg/dL  Optimal...........>60 mg/dL      LDL Cholesterol 02/04/2024 95.6  63.0 - 159.0 mg/dL Final    Comment: The National Cholesterol Education Program (NCEP) has set the  following guidelines (reference values) for LDL Cholesterol:  Optimal.......................<130 mg/dL  Borderline High...............130-159 mg/dL  High..........................160-189 mg/dL  Very High.....................>190 mg/dL      HDL/Cholesterol Ratio 02/04/2024 14.6 (L)  20.0 - 50.0 % Final    Total Cholesterol/HDL Ratio 02/04/2024 6.9 (H)  2.0 - 5.0 Final    Non-HDL Cholesterol 02/04/2024 135  mg/dL Final    Comment: Risk category and Non-HDL cholesterol goals:  Coronary heart disease (CHD)or equivalent (10-year risk of CHD >20%):  Non-HDL cholesterol goal     <130 mg/dL  Two or more CHD risk factors and 10-year risk of CHD <= 20%:  Non-HDL cholesterol goal     <160 mg/dL  0 to 1 CHD risk factor:  Non-HDL cholesterol goal     <190 mg/dL      Prolactin 02/04/2024 15.5  3.5 - 19.4 ng/mL Final    Testosterone 02/04/2024 378  250 - 1100 ng/dL Final    Comment: Men age 18 or above, with clinically significant hypogonadal  symptoms  and total testosterone values repeatedly in the range of 200-300 ng/dL  or less, may benefit from testosterone treatment after adequate risk  and benefits counseling.    For additional information, please refer to  [email or web address]/TotalTestosteroneLCMSMS  (This link is being provided for informational/educational purposes  only.)    This test was developed and its analytical performance characteristics  have been determined by [facility]. It has not been cleared or  approved by FDA. This assay has been validated pursuant to the CLIA  regulations and is used for clinical purposes.    Test Performed at:  [facility] [address]  [id number]     PRAMOD Suarez MD, PhD, NEIL      Testosterone, Free 02/04/2024 58.5  46.0 - 224.0 pg/mL Final    Comment: This test was developed and its analytical performance characteristics  have been determined by [facility]. It has not been cleared or  approved by FDA. This assay has been validated pursuant to the CLIA  regulations and is used for clinical purposes.      Testosterone, Bioavailable 02/04/2024 115.3  ng/dL Final    Comment: Reference Range:  110.0-575.0  Test Performed at:  [facility]  [address]     PRAMOD Suarez MD, PhD, NEIL      Sex Hormone Binding Globulin 02/04/2024 27  22 - 77 nmol/L Final    Comment: Test Performed at:  [facility] [address]  [address]     PRAMOD Suarez MD, PhD, NEIL      Albumin 02/04/2024 4.3  3.6 - 5.1 g/dL Final    Comment: Test Performed at:  [facility] [address]  [address]     PRAMOD Suarez MD, PhD, NEIL      WBC 02/04/2024 5.86  3.90 - 12.70 K/uL Final    RBC 02/04/2024 4.60  4.60 - 6.20 M/uL Final    Hemoglobin 02/04/2024 13.4 (L)  14.0 - 18.0 g/dL Final     Hematocrit 02/04/2024 43.1  40.0 - 54.0 % Final    MCV 02/04/2024 94  82 - 98 fL Final    MCH 02/04/2024 29.1  27.0 - 31.0 pg Final    MCHC 02/04/2024 31.1 (L)  32.0 - 36.0 g/dL Final    RDW 02/04/2024 14.4  11.5 - 14.5 % Final    Platelets 02/04/2024 210  150 - 450 K/uL Final    MPV 02/04/2024 11.9  9.2 - 12.9 fL Final    Immature Granulocytes 02/04/2024 0.5  0.0 - 0.5 % Final    Gran # (ANC) 02/04/2024 3.7  1.8 - 7.7 K/uL Final    Immature Grans (Abs) 02/04/2024 0.03  0.00 - 0.04 K/uL Final    Comment: Mild elevation in immature granulocytes is non specific and   can be seen in a variety of conditions including stress response,   acute inflammation, trauma and pregnancy. Correlation with other   laboratory and clinical findings is essential.      Lymph # 02/04/2024 1.6  1.0 - 4.8 K/uL Final    Mono # 02/04/2024 0.4  0.3 - 1.0 K/uL Final    Eos # 02/04/2024 0.2  0.0 - 0.5 K/uL Final    Baso # 02/04/2024 0.04  0.00 - 0.20 K/uL Final    nRBC 02/04/2024 0  0 /100 WBC Final    Gran % 02/04/2024 62.2  38.0 - 73.0 % Final    Lymph % 02/04/2024 26.5  18.0 - 48.0 % Final    Mono % 02/04/2024 6.5  4.0 - 15.0 % Final    Eosinophil % 02/04/2024 3.6  0.0 - 8.0 % Final    Basophil % 02/04/2024 0.7  0.0 - 1.9 % Final    Differential Method 02/04/2024 Automated   Final    Hemoglobin A1C 02/04/2024 5.2  4.0 - 5.6 % Final    Comment: ADA Screening Guidelines:  5.7-6.4%  Consistent with prediabetes  >or=6.5%  Consistent with diabetes    High levels of fetal hemoglobin interfere with the HbA1C  assay. Heterozygous hemoglobin variants (HbS, HgC, etc)do  not significantly interfere with this assay.   However, presence of multiple variants may affect accuracy.      Estimated Avg Glucose 02/04/2024 103  68 - 131 mg/dL Final       EKG  Reviewed    Echo   Results for orders placed or performed in visit on 08/16/22   Echo   Result Value Ref Range    TDI SEPTAL 0.06 m/s    LV LATERAL E/E' RATIO 5.10 m/s    LV SEPTAL E/E' RATIO 8.50 m/s     Left Ventricular Outflow Tract Mean Velocity 0.81 cm/s    Left Ventricular Outflow Tract Mean Gradient 2.94 mmHg    AORTIC VALVE CUSP SEPERATION 17.062924397226620 cm    TDI LATERAL 0.10 m/s    PV PEAK VELOCITY 1.15 cm/s    LVIDd 4.13 3.5 - 6.0 cm    IVS 1.28 (A) 0.6 - 1.1 cm    PW 1.18 (A) 0.6 - 1.1 cm    Ao root annulus 2.85 cm    LVIDs 3.30 2.1 - 4.0 cm    FS 20 28 - 44 %    Sinus 2.70 cm    Ascending aorta 2.93 cm    LV mass 180.10 g    RVDD 2.53 cm    Left Ventricle Relative Wall Thickness 0.57 cm    AV mean gradient 5 mmHg    AV valve area 1.57 cm2    AV Velocity Ratio 0.76     AV index (prosthetic) 0.71     MV valve area p 1/2 method 3.18 cm2    E/A ratio 0.72     Mean e' 0.08 m/s    E wave deceleration time 223.20 msec    IVRT 57.09 msec    LVOT diameter 1.68 cm    LVOT area 2.2 cm2    LVOT peak danial 1.12 m/s    LVOT peak VTI 21.10 cm    Ao peak danial 1.48 m/s    Ao VTI 29.7 cm    LVOT stroke volume 46.75 cm3    AV peak gradient 9 mmHg    E/E' ratio 6.38 m/s    MV Peak E Danial 0.51 m/s    TR Max Danial 2.67 m/s    MV stenosis pressure 1/2 time 69.23 ms    MV Peak A Danial 0.71 m/s    LV Systolic Volume 43.98 mL    LV Diastolic Volume 75.52 mL    RA Major Axis 4.02 cm    Left Atrium Minor Axis 2.72 cm    Left Atrium Major Axis 5.32 cm    Triscuspid Valve Regurgitation Peak Gradient 29 mmHg    LA Vol (MOD) 0.00 cm3    RA Width 3.02 cm    EF 40 %    Narrative    · The left ventricle is normal in size with mild concentric hypertrophy   and mildly decreased systolic function.  · The estimated ejection fraction is 40%.  · Grade I left ventricular diastolic dysfunction.  · Normal right ventricular size with normal right ventricular systolic   function.  · There are segmental left ventricular wall motion abnormalities.  · There is focal anteroseptal akinesis and apical dyskinesis consistent   with an infarct          Imaging  Echo    Result Date: 1/16/2025    Left Ventricle: The left ventricle is normal in size. Mildly  increased wall thickness. There is concentric remodeling. Regional wall motion abnormalities present. There is normal systolic function with a visually estimated ejection fraction of 55 - 60%. Grade I diastolic dysfunction.   Right Ventricle: Normal right ventricular cavity size. Wall thickness is normal. Systolic function is normal.       Prior coronary angiogram / intervention:  See HPI    Assessment and Plan  Mr. Almaguer is a 59-year-old male with past medical history of  hypertension, hyperlipidemia, Holter 2022 rare PVCs occasional PACs, coronary angiogram 2022 LAD  with left-to-left collaterals, HFmrEF echo 2022 EF estimated 40% grade 1 diastolic dysfunction regional wall motion abnormalities in the LAD territory normal RV  Carotid ultrasound 2022 no hemodynamically significant stenosis  The patient appears to have been on alfuzosin for BPH     Hypertension, unspecified type  Blood pressure good today  Continue lisinopril  Increased metoprolol to 100 mg daily     Pure hypercholesterolemia  LDL goal <70  Continue Crestor added Zetia  Follow PCP labs     Aortic atherosclerosis  As above     HFmrEF  Euvolemic  Continue beta-blocker and lisinopril  Increased beta-blocker  Echo ordered  Encouraged low-sodium diet and daily weights  Wt Readings from Last 3 Encounters:   01/16/25 128.4 kg (283 lb)   01/09/25 128.7 kg (283 lb 10 oz)   09/18/24 122 kg (268 lb 15.4 oz)     Atherosclerosis of native coronary artery of native heart with angina pectoris  Continue Crestor and aspirin 81 with beta-blocker    Morbid (severe) obesity due to excess calories (Primary)  Encouraged a heart healthy diet that is low in saturated fats and sodium   Also encouraged an increase in activities as tolerated for healthy weight management   Discussed Mediterranean Diet recommendations (Adopted from Marley et al, NE, 2018.)  - Eat primarily plant-based foods, such as fruits/vegetables, whole grains, legumes & nuts  - Limit refined  carbohydrates (white pasta, bread, rice).  - Replace butter with healthy fats such as olive oil.  - Use herbs and spices instead of salt to flavor foods.  - Limit red meat and processed meats to no more than a few times a month.  - Avoid sugary sodas, bakery goods, and sweets.  - Eat fish and poultry at least twice a week.              - Get plenty of exercise (150 minutes per week).     Follow Up  Follow up in about 3 months (around 4/9/2025), or if symptoms worsen or fail to improve.       Ema Ontiveros, FNP-C [facility] - Cardiology    Total professional time spent for the encounter:  40 minutes  Time was spent preparing to see the patient, reviewing results of prior testing, obtaining and/or reviewing separately obtained history, performing a medically appropriate examination and interview, counseling and educating the patient/family, ordering medications/tests/procedures, referring and communicating with other health care professionals, documenting clinical information in the electronic health record, and independently interpreting results.    Disclaimer: This document was created using voice recognition software ([employer] Direct). Although it may be edited, this document may contain errors related to incorrect recognition of the spoken word. Please call the physician if clarification is needed.

## 2025-01-13 DIAGNOSIS — Z00.00 ENCOUNTER FOR MEDICARE ANNUAL WELLNESS EXAM: ICD-10-CM

## 2025-01-15 DIAGNOSIS — G47.33 OSA (OBSTRUCTIVE SLEEP APNEA): Primary | ICD-10-CM

## 2025-01-18 PROBLEM — E66.01 MORBID (SEVERE) OBESITY DUE TO EXCESS CALORIES: Status: ACTIVE | Noted: 2025-01-18

## 2025-01-22 ENCOUNTER — PATIENT MESSAGE (OUTPATIENT)
Dept: CARDIOLOGY | Facility: CLINIC | Age: 60
End: 2025-01-22
Payer: MEDICARE

## 2025-01-24 ENCOUNTER — PATIENT MESSAGE (OUTPATIENT)
Dept: CARDIOLOGY | Facility: CLINIC | Age: 60
End: 2025-01-24
Payer: MEDICARE

## 2025-01-28 ENCOUNTER — PATIENT MESSAGE (OUTPATIENT)
Dept: CARDIOLOGY | Facility: CLINIC | Age: 60
End: 2025-01-28
Payer: MEDICARE

## 2025-01-29 ENCOUNTER — PATIENT MESSAGE (OUTPATIENT)
Dept: NEUROSURGERY | Facility: CLINIC | Age: 60
End: 2025-01-29
Payer: MEDICARE

## 2025-01-29 ENCOUNTER — PATIENT MESSAGE (OUTPATIENT)
Dept: CARDIOLOGY | Facility: CLINIC | Age: 60
End: 2025-01-29
Payer: MEDICARE

## 2025-01-30 ENCOUNTER — TELEPHONE (OUTPATIENT)
Dept: ENDOCRINOLOGY | Facility: CLINIC | Age: 60
End: 2025-01-30
Payer: MEDICARE

## 2025-01-30 ENCOUNTER — PATIENT MESSAGE (OUTPATIENT)
Dept: PRIMARY CARE CLINIC | Facility: CLINIC | Age: 60
End: 2025-01-30
Payer: MEDICARE

## 2025-01-30 DIAGNOSIS — F41.9 ANXIETY: Primary | ICD-10-CM

## 2025-01-30 NOTE — TELEPHONE ENCOUNTER
----- Message from Hans Lind sent at 1/30/2025 11:29 AM CST -----  Regarding: RE: Call request  Contact: [phone]  Can he maybe ask his PCP, I dont know anything about him or if he has any allergies, commodities or anything. If his PCP say no then I guess I would have to, I just need to ask the attending I'm seeing him with since Krystina never been in this situation before.  ----- Message -----  From: Kiesha John MA  Sent: 1/30/2025  11:09 AM CST  To: Hans Lind MD  Subject: FW: Call request                                 Pt has not been seen since Jan 2024 which was with dr lang. Pt is scheduled with you in march. He needs to schedule MRI but is wanting some type of medication for the mri because he has claustrophobia. Not sure if this is something you can even do?  ----- Message -----  From: Charanjit Cedillo  Sent: 1/30/2025  11:03 AM CST  To: Diogo Maxwell Staff  Subject: Call request                                     Hi,    Who called: The pt       Reason: Pt would like to request a call from the nurse to discuss getting medication prior to scheduling his MRI for the Pituitary. Pls call the pt at [phone] to discuss.    [facility] - [place] - [address]  [address] [place] [address]  Phone: [phone] Fax: [phone]       Provider's name: Dr. Lind       Additional Information: Thank you.

## 2025-01-31 DIAGNOSIS — F40.240 CLAUSTROPHOBIA: Primary | ICD-10-CM

## 2025-01-31 RX ORDER — DIAZEPAM 5 MG/1
5 TABLET ORAL DAILY PRN
Qty: 3 TABLET | Refills: 0 | Status: SHIPPED | OUTPATIENT
Start: 2025-01-31 | End: 2025-01-31

## 2025-01-31 RX ORDER — DIAZEPAM 5 MG/1
TABLET ORAL
Qty: 2 TABLET | Status: SHIPPED | OUTPATIENT
Start: 2025-01-31

## 2025-01-31 NOTE — TELEPHONE ENCOUNTER
Pt has a pituitary m.r.i. scheduled for Feb 6th at 2:00 p.m.  he is requesting medication to be taken prior to the procedure since he is claustrophobic

## 2025-02-02 DIAGNOSIS — E78.5 HYPERLIPIDEMIA, UNSPECIFIED HYPERLIPIDEMIA TYPE: ICD-10-CM

## 2025-02-02 DIAGNOSIS — I25.10 CORONARY ARTERY DISEASE INVOLVING NATIVE CORONARY ARTERY OF NATIVE HEART, UNSPECIFIED WHETHER ANGINA PRESENT: ICD-10-CM

## 2025-02-02 NOTE — TELEPHONE ENCOUNTER
Care Due:                  Date            Visit Type   Department     Provider  --------------------------------------------------------------------------------                                EP -                              PRIMARY [facility]  Last Visit: 09-      CARE ([facility])   PRIMARY CARE   Jaret Armendariz                              EP - [facility]IMARY [facility]  Next Visit: 03-      CARE ([facility])   PRIMARY CARE   Jaret Armendariz                                                            Last  Test          Frequency    Reason                     Performed    Due Date  --------------------------------------------------------------------------------    CBC.........  12 months..  fenofibrate, meloxicam...  02- 01-    CMP.........  12 months..  ergocalciferol,            02- 01-                             fenofibrate, lisinopriL,                             meloxicam, omega-3,                             rosuvastatin.............    Lipid Panel.  12 months..  fenofibrate, omega-3,      02- 01-                             rosuvastatin.............    Vitamin D...  12 months..  ergocalciferol...........  Not Found    Overdue    [employer] Embedded Care Due Messages. Reference number: [id number].   2/02/2025 8:02:22 AM CST

## 2025-02-03 RX ORDER — OMEGA-3-ACID ETHYL ESTERS 1 G/1
2 CAPSULE, LIQUID FILLED ORAL 2 TIMES DAILY
Qty: 360 CAPSULE | Refills: 1 | Status: SHIPPED | OUTPATIENT
Start: 2025-02-03

## 2025-02-03 NOTE — TELEPHONE ENCOUNTER
Refill Routing Note   Medication(s) are not appropriate for processing by [facility] for the following reason(s):        Required labs outdated    [facility] action(s):  Defer     Requires labs : Yes             Appointments  past 12m or future 3m with PCP    Date Provider   Last Visit   9/18/2024 Jaret Armendariz MD   Next Visit   3/19/2025 Jaret Armendariz MD   ED visits in past 90 days: 0        Note composed:3:55 AM 02/03/2025

## 2025-02-19 ENCOUNTER — TELEPHONE (OUTPATIENT)
Dept: CARDIOLOGY | Facility: CLINIC | Age: 60
End: 2025-02-19
Payer: MEDICARE

## 2025-02-21 NOTE — TELEPHONE ENCOUNTER
LM for pt regarding rescheduling appt with Dr. Molina on 4/9. Will send pt a message on the portal with new appt date and time.

## 2025-03-14 NOTE — PROGRESS NOTES
[facility]   03/17/2025     The patient's last visit with Dr. Robles on 1/30/2024    Subjective:      Reason for referal: follow up prolactinoma and low testosterone     HPI:   Collin Almaguer is a 60 y.o. male with hx of low testosterone, hypertension, hyperlipidemia, depression, chronic back pain, CKD and other medical problems who presents for evaluation of pituitary lesion (with elevated prolactin and low testosterone)     He was also seen by Dr. Basurto in neurosurgery in June 2023,  no surgical intervention was recommended and he was recommended endocrine follow-up.      Initial presentation: MRI done for headache with incidental finding of pituitary lesion.      Interval hx:  Was unable to get MRI pit. prior to this visit.  Has not been in communication with Neurosurgery since seeing Dr. Basurto in 2023.  Endorses headaches and neck pain 5 times/week, denies any worsening of baseline.  Had vision checked, endorses new diagnosis of cataracts.  Endorses taking Cabergoline 0.25 mg three times/week.   Endorses fatigue, states it is getting worse, did have KHRIS test previously but was unable to complete it, requesting new referral on this visit due to poor sleep and waking up with fatigue.      Imaging:    Pituitary MRI 11/2023  Stable 7 mm lesion in the right side of the pituitary gland.  No encroachment on the optic chiasm.       Pituitary MRI 5/26/23  There is a lesion identified within the anterior portion of the pituitary gland to the right of midline measuring 7 mm in transverse dimension consistent with a micro adenoma if clinically consistent.  No encroachment on the optic chiasm is identified.  No invasion of the cavernous sinus is suggested    Headache:    + chronic HA    Vision change:   denies    Formal Visual fields:   HVF not done (no significant suprasellar extension)      Current symptoms:  Hyperprolactinemia:  []  breast tenderness []  nipple discharge  [x]  Denies   Hx of  "gynecomastia  Lab Results   Component Value Date    PROLACTIN 15.5 02/04/2024    PROLACTIN 13.0 11/10/2023    PROLACTIN 21.3 (H) 08/18/2023    PROLACTIN 46.5 (H) 06/14/2023    PROLACTIN 48.3 (H) 03/23/2023     Thyroid:    Lab Results   Component Value Date    TSH 1.212 06/14/2023    FREET4 1.02 06/14/2023          Growth Hormone Excess:    Last IGF-1:   Lab Results   Component Value Date    SOMATMDN 115 06/14/2023      Cushing's syndrome:   []  Easy bruising [x]  Weight gain  []  Worse glycemic control   []  HTN  []  Acne  []  Hirsutism  []  Striae  []  Menstrual change []  VTE   []  Fractures  []  Denies All    Lab Results   Component Value Date    HGBA1C 5.2 02/04/2024      Gonadotrophs:  Symptoms of low testosterone for several years.  Was on testosterone injections but stopped about 3-4 years ago (early 2020s) for polycythemia, remains off of testosterone therapy at this time.       []  Irregular menses []  Postmenopausal  [x]  Decreased libido   [x]  ED   []  Denies    Lab Results   Component Value Date    HCT 43.1 02/04/2024    HCT 41.8 08/18/2023    HCT 43.9 03/23/2023    TOTALTESTOST 273 (L) 08/18/2023    TOTALTESTOST 238 (L) 06/14/2023    TOTALTESTOST 1161 05/01/2020    TESTOSTERONE 378 02/04/2024    TESTOSTERONE 58.5 02/04/2024    PSA 0.49 03/23/2023    PSA 0.98 05/01/2020    HDL 23 (L) 02/04/2024    HDL 22 (L) 08/18/2023    TRIG 197 (H) 02/04/2024    TRIG 280 (H) 08/18/2023    ALT 33 02/04/2024    ALT 69 (H) 08/18/2023    ALT 49 (H) 11/18/2022    AST 24 02/04/2024    AST 48 (H) 08/18/2023    AST 31 11/18/2022         Objective:   Physical Exam   /88 (BP Location: Right arm, Patient Position: Sitting)   Ht 5' 9" (1.753 m)   Wt 128.3 kg (282 lb 13.6 oz)   BMI 41.77 kg/m²   Wt Readings from Last 3 Encounters:   03/17/25 128.3 kg (282 lb 13.6 oz)   01/16/25 128.4 kg (283 lb)   01/09/25 128.7 kg (283 lb 10 oz)   ]    Constitutional:  Pleasant,  in no acute distress.   HENT:   Eyes:     No scleral " icterus.   Respiratory:   Effort normal   Neurological:  normal speech  Psych:  Normal mood and affect.      LABORATORY REVIEW:    Chemistry        Component Value Date/Time     02/04/2024 0830    K 3.9 02/04/2024 0830     02/04/2024 0830    CO2 26 02/04/2024 0830    BUN 20 02/04/2024 0830    CREATININE 1.1 02/04/2024 0830    GLU 94 02/04/2024 0830        Component Value Date/Time    CALCIUM 9.2 02/04/2024 0830    ALKPHOS 48 (L) 02/04/2024 0830    AST 24 02/04/2024 0830    ALT 33 02/04/2024 0830    BILITOT 0.4 02/04/2024 0830    ESTGFRAFRICA 48.6 (A) 12/16/2020 1214    EGFRNONAA 42.0 (A) 12/16/2020 1214        Lab Results   Component Value Date    CALCIUM 9.2 02/04/2024    ALBUMIN 3.8 02/04/2024    ESTGFRAFRICA 48.6 (A) 12/16/2020    EGFRNONAA 42.0 (A) 12/16/2020    ALKPHOS 48 (L) 02/04/2024    NJIRRCCX93DQ 27 (L) 12/16/2020    TSH 1.212 06/14/2023        Pituitary MRI 5/26/23  There is a lesion identified within the anterior portion of the pituitary gland to the right of midline measuring 7 mm in transverse dimension consistent with a micro adenoma if clinically consistent.  No encroachment on the optic chiasm is identified.  No invasion of the cavernous sinus is suggested        Assessment/Plan:     Problem List Items Addressed This Visit       Class 3 severe obesity due to excess calories with body mass index (BMI) of 40.0 to 44.9 in adult    20 lbs weight gain in 8 months time due to lack of exercise and diet  Patient endorses fatigue on this visit.    -Will refer patient for sleep study to diagnose possible KHRIS as patient mentions he wakes up not refreshed and has been having more fatigue as of lately.  -Encouraged healthy diet and regular exercise.         Low testosterone    Low testosterone levels in the past in the setting of elevated prolactin levels, most recent levels after prolactin levels have decreased indicate improvement.  Likely was multifactorial due to elevated prolactin level and  obesity    -Encouraged weight loss         Pituitary lesion (Chronic)    Pit. lesion discovered on 5/2023 measuring 7 mm, repeat imaging on 11/2023 demonstrated stable lesion with no encroachment on the optic chiasm  Found to have elevated prolactin levels otherwise work up was negative, patient was started on Cabergoline 0.25 mg three times weekly    -Obtain MRI pituitary for yearly repeat imaging  -No indication to test for other hormones at this time.         Elevated prolactin level - Primary    Found to have elevated prolactin levels in the setting of pit. lesion, level peaked at 48.3  Patient was started on cabergoline 0.25 mg three times daily for over 1 year now  Most recent prolactin level of 15.5 on 2/2025    -Repeat prolactin level  -Continue current dose of cabergoline 0.25 mg three times/weekly         Relevant Orders    MRI Pituitary W W/O Contrast    Hypersomnolence    Relevant Orders    Ambulatory referral/consult to Sleep Disorders       Visit today included increased complexity associated with the care of the episodic problem hyperprolactinemia addressed and managing the longitudinal care of the patient due to the serious and/or complex managed problem(s).     Hans Lind MD  [facility]

## 2025-03-17 ENCOUNTER — OFFICE VISIT (OUTPATIENT)
Dept: ENDOCRINOLOGY | Facility: CLINIC | Age: 60
End: 2025-03-17
Payer: MEDICARE

## 2025-03-17 VITALS
BODY MASS INDEX: 41.9 KG/M2 | SYSTOLIC BLOOD PRESSURE: 130 MMHG | HEIGHT: 69 IN | WEIGHT: 282.88 LBS | DIASTOLIC BLOOD PRESSURE: 88 MMHG

## 2025-03-17 DIAGNOSIS — G47.10 HYPERSOMNOLENCE: ICD-10-CM

## 2025-03-17 DIAGNOSIS — R79.89 LOW TESTOSTERONE: ICD-10-CM

## 2025-03-17 DIAGNOSIS — E23.7 PITUITARY LESION: Chronic | ICD-10-CM

## 2025-03-17 DIAGNOSIS — R79.89 ELEVATED PROLACTIN LEVEL: Primary | ICD-10-CM

## 2025-03-17 DIAGNOSIS — E66.813 CLASS 3 SEVERE OBESITY DUE TO EXCESS CALORIES WITH BODY MASS INDEX (BMI) OF 40.0 TO 44.9 IN ADULT, UNSPECIFIED WHETHER SERIOUS COMORBIDITY PRESENT: ICD-10-CM

## 2025-03-17 DIAGNOSIS — E66.01 CLASS 3 SEVERE OBESITY DUE TO EXCESS CALORIES WITH BODY MASS INDEX (BMI) OF 40.0 TO 44.9 IN ADULT, UNSPECIFIED WHETHER SERIOUS COMORBIDITY PRESENT: ICD-10-CM

## 2025-03-17 PROCEDURE — 4010F ACE/ARB THERAPY RXD/TAKEN: CPT | Mod: HCNC,CPTII,GC,S$GLB | Performed by: STUDENT IN AN ORGANIZED HEALTH CARE EDUCATION/TRAINING PROGRAM

## 2025-03-17 PROCEDURE — 99999 PR PBB SHADOW E&M-EST. PATIENT-LVL IV: CPT | Mod: PBBFAC,HCNC,GC, | Performed by: STUDENT IN AN ORGANIZED HEALTH CARE EDUCATION/TRAINING PROGRAM

## 2025-03-17 PROCEDURE — G2211 COMPLEX E/M VISIT ADD ON: HCPCS | Mod: HCNC,GC,S$GLB, | Performed by: STUDENT IN AN ORGANIZED HEALTH CARE EDUCATION/TRAINING PROGRAM

## 2025-03-17 PROCEDURE — 1160F RVW MEDS BY RX/DR IN RCRD: CPT | Mod: HCNC,CPTII,GC,S$GLB | Performed by: STUDENT IN AN ORGANIZED HEALTH CARE EDUCATION/TRAINING PROGRAM

## 2025-03-17 PROCEDURE — 3075F SYST BP GE 130 - 139MM HG: CPT | Mod: HCNC,CPTII,GC,S$GLB | Performed by: STUDENT IN AN ORGANIZED HEALTH CARE EDUCATION/TRAINING PROGRAM

## 2025-03-17 PROCEDURE — 99214 OFFICE O/P EST MOD 30 MIN: CPT | Mod: HCNC,GC,S$GLB, | Performed by: STUDENT IN AN ORGANIZED HEALTH CARE EDUCATION/TRAINING PROGRAM

## 2025-03-17 PROCEDURE — 3008F BODY MASS INDEX DOCD: CPT | Mod: HCNC,CPTII,GC,S$GLB | Performed by: STUDENT IN AN ORGANIZED HEALTH CARE EDUCATION/TRAINING PROGRAM

## 2025-03-17 PROCEDURE — 1159F MED LIST DOCD IN RCRD: CPT | Mod: HCNC,CPTII,GC,S$GLB | Performed by: STUDENT IN AN ORGANIZED HEALTH CARE EDUCATION/TRAINING PROGRAM

## 2025-03-17 PROCEDURE — 3079F DIAST BP 80-89 MM HG: CPT | Mod: HCNC,CPTII,GC,S$GLB | Performed by: STUDENT IN AN ORGANIZED HEALTH CARE EDUCATION/TRAINING PROGRAM

## 2025-03-17 RX ORDER — OXYCODONE AND ACETAMINOPHEN 10; 325 MG/1; MG/1
1 TABLET ORAL 4 TIMES DAILY PRN
COMMUNITY
Start: 2025-02-28

## 2025-03-17 NOTE — ASSESSMENT & PLAN NOTE
20 lbs weight gain in 8 months time due to lack of exercise and diet  Patient endorses fatigue on this visit.    -Will refer patient for sleep study to diagnose possible KHRIS as patient mentions he wakes up not refreshed and has been having more fatigue as of lately.  -Encouraged healthy diet and regular exercise.

## 2025-03-17 NOTE — ASSESSMENT & PLAN NOTE
Low testosterone levels in the past in the setting of elevated prolactin levels, most recent levels after prolactin levels have decreased indicate improvement.  Likely was multifactorial due to elevated prolactin level and obesity    -Encouraged weight loss

## 2025-03-17 NOTE — PROGRESS NOTES
I have reviewed and concur with Dr. Lind's history, physical, assessment, and plan.  I have personally interviewed and examined the patient.      Estela Renae MD

## 2025-03-17 NOTE — ASSESSMENT & PLAN NOTE
Pit. lesion discovered on 5/2023 measuring 7 mm, repeat imaging on 11/2023 demonstrated stable lesion with no encroachment on the optic chiasm  Found to have elevated prolactin levels otherwise work up was negative, patient was started on Cabergoline 0.25 mg three times weekly    -Obtain MRI pituitary for yearly repeat imaging  -No indication to test for other hormones at this time.

## 2025-03-17 NOTE — ASSESSMENT & PLAN NOTE
Found to have elevated prolactin levels in the setting of pit. lesion, level peaked at 48.3  Patient was started on cabergoline 0.25 mg three times daily for over 1 year now  Most recent prolactin level of 15.5 on 2/2025    -Repeat prolactin level  -Continue current dose of cabergoline 0.25 mg three times/weekly

## 2025-03-19 ENCOUNTER — OFFICE VISIT (OUTPATIENT)
Dept: PRIMARY CARE CLINIC | Facility: CLINIC | Age: 60
End: 2025-03-19
Payer: MEDICARE

## 2025-03-19 ENCOUNTER — RESULTS FOLLOW-UP (OUTPATIENT)
Dept: PRIMARY CARE CLINIC | Facility: CLINIC | Age: 60
End: 2025-03-19

## 2025-03-19 VITALS
BODY MASS INDEX: 41.9 KG/M2 | HEART RATE: 93 BPM | WEIGHT: 282.88 LBS | RESPIRATION RATE: 18 BRPM | OXYGEN SATURATION: 96 % | SYSTOLIC BLOOD PRESSURE: 138 MMHG | HEIGHT: 69 IN | DIASTOLIC BLOOD PRESSURE: 82 MMHG

## 2025-03-19 DIAGNOSIS — E29.1 HYPOGONADISM MALE: ICD-10-CM

## 2025-03-19 DIAGNOSIS — E66.01 MORBID (SEVERE) OBESITY DUE TO EXCESS CALORIES: ICD-10-CM

## 2025-03-19 DIAGNOSIS — I10 HYPERTENSION, UNSPECIFIED TYPE: ICD-10-CM

## 2025-03-19 DIAGNOSIS — M96.1 POST LAMINECTOMY SYNDROME: ICD-10-CM

## 2025-03-19 DIAGNOSIS — M79.645 CHRONIC PAIN OF LEFT THUMB: Primary | ICD-10-CM

## 2025-03-19 DIAGNOSIS — E23.7 PITUITARY LESION: Chronic | ICD-10-CM

## 2025-03-19 DIAGNOSIS — M25.532 LEFT WRIST PAIN: ICD-10-CM

## 2025-03-19 DIAGNOSIS — D35.2 HYPERPROLACTINOMA: ICD-10-CM

## 2025-03-19 DIAGNOSIS — G89.29 CHRONIC PAIN OF LEFT THUMB: Primary | ICD-10-CM

## 2025-03-19 DIAGNOSIS — E78.00 PURE HYPERCHOLESTEROLEMIA: ICD-10-CM

## 2025-03-19 PROCEDURE — 3008F BODY MASS INDEX DOCD: CPT | Mod: HCNC,CPTII,S$GLB, | Performed by: FAMILY MEDICINE

## 2025-03-19 PROCEDURE — 3079F DIAST BP 80-89 MM HG: CPT | Mod: HCNC,CPTII,S$GLB, | Performed by: FAMILY MEDICINE

## 2025-03-19 PROCEDURE — 4010F ACE/ARB THERAPY RXD/TAKEN: CPT | Mod: HCNC,CPTII,S$GLB, | Performed by: FAMILY MEDICINE

## 2025-03-19 PROCEDURE — 1159F MED LIST DOCD IN RCRD: CPT | Mod: HCNC,CPTII,S$GLB, | Performed by: FAMILY MEDICINE

## 2025-03-19 PROCEDURE — 99214 OFFICE O/P EST MOD 30 MIN: CPT | Mod: HCNC,25,S$GLB, | Performed by: FAMILY MEDICINE

## 2025-03-19 PROCEDURE — 99999 PR PBB SHADOW E&M-EST. PATIENT-LVL IV: CPT | Mod: PBBFAC,HCNC,, | Performed by: FAMILY MEDICINE

## 2025-03-19 PROCEDURE — 96372 THER/PROPH/DIAG INJ SC/IM: CPT | Mod: HCNC,S$GLB,, | Performed by: FAMILY MEDICINE

## 2025-03-19 PROCEDURE — 3075F SYST BP GE 130 - 139MM HG: CPT | Mod: HCNC,CPTII,S$GLB, | Performed by: FAMILY MEDICINE

## 2025-03-19 RX ORDER — MELOXICAM 7.5 MG/1
TABLET ORAL
Qty: 30 TABLET | Refills: 5 | Status: SHIPPED | OUTPATIENT
Start: 2025-03-19

## 2025-03-19 RX ORDER — METHOCARBAMOL 500 MG/1
TABLET, FILM COATED ORAL
Qty: 60 TABLET | Refills: 5 | Status: SHIPPED | OUTPATIENT
Start: 2025-03-19

## 2025-03-19 RX ORDER — TRIAMCINOLONE ACETONIDE 40 MG/ML
40 INJECTION, SUSPENSION INTRA-ARTICULAR; INTRAMUSCULAR ONCE
Status: COMPLETED | OUTPATIENT
Start: 2025-03-19 | End: 2025-03-19

## 2025-03-19 RX ORDER — PREDNISONE 20 MG/1
TABLET ORAL
Qty: 7 TABLET | Refills: 0 | Status: SHIPPED | OUTPATIENT
Start: 2025-03-19

## 2025-03-19 RX ADMIN — TRIAMCINOLONE ACETONIDE 40 MG: 40 INJECTION, SUSPENSION INTRA-ARTICULAR; INTRAMUSCULAR at 11:03

## 2025-03-19 NOTE — PROGRESS NOTES
Subjective:       Patient ID: Collin Almaguer Sr. is a 60 y.o. male.    Chief Complaint: 6 month check up        HPI 60-year-old white male in for six-month checkup---pain times 4-5 years in the left thenar area and proximal wrist radial area---hurts if lift things--occasionally drops things--occasionally hurting at rest  Dr Lind --right pituitary lesion 7 mm anterior pituitary area  Hyperlipidemia on atorvastatin-- crestor   BPH on flomax   Hypertension blood pressure 138/82 on Toprol increased 25 mg to 50 m=g  Hx MI on baby ASA and Imdur   History depression on Zoloft   history of bronchospasm on albuterol  Patient states continually falling asleep--sometimes for 1-2 hours--was supposed to have sleep study but never did it worried may have narcolepsy--       ROS:    Skin: no psoriasis, eczema, skin cancer--  HEENT: +occs  headache see HPI --pituitary tumor--still having headache ,no  ocular pain, blurred vision, diplopia, epistaxis, hoarseness change in voice, thyroid trouble +glaucoma itis bilaterally left greater than right  Lung: No pneumonia, +asthma as child ,no  Tb, wheezing, SOB, no smoking occasional bronchospasm  Heart: no chest pain,no ankle edema, palpitations, MI, tiffanie murmur,+hypertension,+ hyperlipidemia--no stent bypass arrhythmia--never seen by cardiologist   Abdomen: no nausea, no  vomiting, diarrhea,+ constipation-a lot better no  ulcers, hepatitis, gallbladder disease, melena, hematochezia, hematemesis   : no UTI, renal disease, stones prostate  MS: no fractures, O/A, lupus, rheumatoid, gout--neck and back problem--old xray  OK --laminectomy and rods hips --last fall patient gel over pile of debris in yd 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  Neuro: no  Dizziness, no  LOC, seizures   No diabetes, no anemia, +anxiety, + depression on meds Disabled  --being home all the time makes patient depressed--found father is at home  2014  , 5 children,  disability lives alone        Objective:   Physical Exam:  General: Well nourished, well developed, no acute distress + morbid obesity  Skin:  No lesion   HEENT: Eyes PERRLA, EOM intact, nose patent, throat non-erythematous ears TMs clear  NECK: Supple, no bruits, No JVD, no nodes   Lungs: Clear, no rales, rhonchi, wheezing  Heart: Regular rate and rhythm, no murmurs, gallops, or rubs  Abdomen: flat, bowel sounds positive, no tenderness, or organomegaly some slight ventral herniation--mild--some complaints of right costal angle swelling that comes and goes suggestive of hernia   MS:-- tenderness left thumb and wrist---pain left thenar area especially with rotation abduction abduction pain in the radial portion of the left wrist flexion extension inversion eversion---appears to be musculoskeletal more than neurologic so probably not carpal tunnel syndrome--patient states has had pain for 5 years now pain is constant--  Neuro: Alert, CN intact, oriented X 3 Romberg negative heel-toe slight swaying due back issues   Extremities: No cyanosis, clubbing, or edema         Assessment:       1. Chronic pain of left thumb    2. Left wrist pain    3. Pure hypercholesterolemia    4. Hypertension, unspecified type    5. Hypogonadism male    6. Morbid (severe) obesity due to excess calories    7. Pituitary lesion    8. Post laminectomy syndrome    9. Hyperprolactinoma              Plan:       Chronic pain of left thumb    Left wrist pain    Pure hypercholesterolemia    Hypertension, unspecified type    Hypogonadism male    Morbid (severe) obesity due to excess calories    Pituitary lesion    Post laminectomy syndrome    Hyperprolactinoma              Multiple medical issues --  Left thumb pain in left wrist pain---mainly in the left thenar area in the supinator muscle of the left wrist---x-ray left wrist and left hand---Kenalog 40 mg IM---prednisone 20 mg q.d. x7 days start tomorrow Thursday--after prednisone complete Mobic 7.5  mg 1 p.o. b.i.d. no other NSAIDs can take with Tylenol---Robaxin 500 mg q.h.s. for muscle spasm---could get a spica splint--wear daily for about 6 weeks if not better will refer to a hand surgeon  See neurosurgeon-7 mm anterior pituitary tumor--patient was txed with medication x 6 mo --lesion same size told redo MRI one year check size tumor   Numbness in the left foot--intermittent bilateral foot pain--needs to see Dr. Alatorre--Neuro surgery--history laminectomy chronic back pain   Low testosterone patient needs to see Urology   Skin lesion right side of the nose with increased vasculature see Dr. Wise sent pt to surgeon for surgical removal   MRI lumbar spine showed fusion L3-S1 with laminectomy/L1 to with some moderate to severe cord compression due to bulging disc and facet hyper  Chronic back pain--goes to chronic pain clinic---x-ray reviewed from 2020 of pelvic--shows lumbar fusion--with laminectomy--with rods going to both hips--patient states had recent x-ray had diagnostic imaging to be reviewed by Neuro surgery  Morbid obesity--exercise trying get down to ideal body weight--could consider gastric sleeve or bypass --patient was asking me for Adipex told I do not write it ecent   Depression--needs to see a psychiatrist--multiple issues that need to be addressed should be on medication for depression--needs to see psychiatrist--if on SSRI such as Lexapro initially would be helpful with weight but in time but making gain weight may benefit from Wellbutrin  Hyperlipidemia patient on Lipitor tri cor  Hypertension/hyperlipidemia metoprolol 25 mg  History vitamin-D deficiency on vitamin-D  Lab  CBCs CMP lipid T4 TSH prolactin level done this morning will call patient with results  Needs to see neurosurgeon to evaluate lower back and Pituitary lesion  Appt cardiology  for chest pain history angiogram 2022 with a block vessel with collateral circulation some atypical chest pain Had EKG Jan 2024 nonspecific ST  wave changes   Patient most concerned today with left wrist treat with Kenalog/prednisone/Mobic/Robaxin no better to hand surgeon--thumb spica splint  Needs follow-up for pituitary tumor/has chronic back pain morbid obesity history of chest check lab

## 2025-03-19 NOTE — PROGRESS NOTES
Verified pt ID using name and . Allergies reviewed. Administered kenalog 40 in right VG per physician order using aseptic technique. Aspirated and no blood return noted. Pt tolerated well with no adverse reactions noted.

## 2025-03-20 ENCOUNTER — RESULTS FOLLOW-UP (OUTPATIENT)
Dept: PRIMARY CARE CLINIC | Facility: CLINIC | Age: 60
End: 2025-03-20
Payer: MEDICARE

## 2025-03-24 ENCOUNTER — RESULTS FOLLOW-UP (OUTPATIENT)
Dept: ENDOCRINOLOGY | Facility: CLINIC | Age: 60
End: 2025-03-24

## 2025-04-02 DIAGNOSIS — I50.32 CHRONIC HEART FAILURE WITH PRESERVED EJECTION FRACTION: ICD-10-CM

## 2025-04-02 DIAGNOSIS — E78.00 PURE HYPERCHOLESTEROLEMIA: ICD-10-CM

## 2025-04-02 DIAGNOSIS — I25.10 ATHEROSCLEROSIS OF NATIVE CORONARY ARTERY OF NATIVE HEART WITHOUT ANGINA PECTORIS: ICD-10-CM

## 2025-04-02 DIAGNOSIS — I10 PRIMARY HYPERTENSION: ICD-10-CM

## 2025-04-02 DIAGNOSIS — I70.0 AORTIC ATHEROSCLEROSIS: ICD-10-CM

## 2025-04-02 RX ORDER — METOPROLOL SUCCINATE 100 MG/1
100 TABLET, EXTENDED RELEASE ORAL DAILY
Qty: 90 TABLET | Refills: 3 | Status: SHIPPED | OUTPATIENT
Start: 2025-04-02

## 2025-05-08 ENCOUNTER — OFFICE VISIT (OUTPATIENT)
Dept: CARDIOLOGY | Facility: CLINIC | Age: 60
End: 2025-05-08
Payer: MEDICARE

## 2025-05-08 VITALS
HEIGHT: 69 IN | WEIGHT: 282.88 LBS | HEART RATE: 86 BPM | BODY MASS INDEX: 41.9 KG/M2 | DIASTOLIC BLOOD PRESSURE: 74 MMHG | SYSTOLIC BLOOD PRESSURE: 110 MMHG | OXYGEN SATURATION: 97 %

## 2025-05-08 DIAGNOSIS — I51.89 GRADE I DIASTOLIC DYSFUNCTION: ICD-10-CM

## 2025-05-08 DIAGNOSIS — I70.0 AORTIC ATHEROSCLEROSIS: ICD-10-CM

## 2025-05-08 DIAGNOSIS — E66.813 CLASS 3 SEVERE OBESITY DUE TO EXCESS CALORIES WITH SERIOUS COMORBIDITY AND BODY MASS INDEX (BMI) OF 40.0 TO 44.9 IN ADULT: ICD-10-CM

## 2025-05-08 DIAGNOSIS — E66.01 CLASS 3 SEVERE OBESITY DUE TO EXCESS CALORIES WITH SERIOUS COMORBIDITY AND BODY MASS INDEX (BMI) OF 40.0 TO 44.9 IN ADULT: ICD-10-CM

## 2025-05-08 DIAGNOSIS — I25.10 ATHEROSCLEROSIS OF NATIVE CORONARY ARTERY OF NATIVE HEART WITHOUT ANGINA PECTORIS: ICD-10-CM

## 2025-05-08 DIAGNOSIS — I10 HYPERTENSION, UNSPECIFIED TYPE: Primary | ICD-10-CM

## 2025-05-08 DIAGNOSIS — E78.2 MIXED HYPERLIPIDEMIA: ICD-10-CM

## 2025-05-08 PROCEDURE — 99999 PR PBB SHADOW E&M-EST. PATIENT-LVL IV: CPT | Mod: PBBFAC,HCNC,,

## 2025-05-08 NOTE — PROGRESS NOTES
[facility] - Cardiology [address]  Cardiology Clinic Note      5/8/2025  2:30 PM    Problem list  Problem List[1]    History of Present Illness    CHIEF COMPLAINT:  Patient presents today for follow up    CARDIOVASCULAR:  He experienced chest pain over 2 consecutive nights 1 month ago, which he attributes to stress. He denies any recent episodes of chest pain since then.    MEDICATIONS:  He takes Zetia, Rosuvastatin, omega-3 fatty acids 2 pills twice daily, and fenofibric acid as prescribed.    DIET:  He acknowledges having a poor diet consisting mainly of pizza, hamburgers, chicken, and marquez. He uses an air fryer to prepare chicken wings and chicken nuggets.    SOCIAL HISTORY:  He denies alcohol consumption and recreational drug use.      ROS:  General: -fever, -chills, -fatigue, -weight gain, -weight loss  Eyes: -vision changes, -redness, -discharge  ENT: -ear pain, -nasal congestion, -sore throat  Cardiovascular: +chest pain, -palpitations, -lower extremity edema  Respiratory: -cough, +shortness of breath  Gastrointestinal: -abdominal pain, -nausea, -vomiting, -diarrhea, -constipation, -blood in stool  Genitourinary: -dysuria, -hematuria, -frequency  Musculoskeletal: +joint pain, -muscle pain, +limb pain, +pain with movement, +back pain, +neck pain, +shooting pain sensation  Skin: -rash, -lesion  Neurological: -headache, -dizziness, -numbness, -tingling, +migraines, +balance issues  Psychiatric: -anxiety, -depression, -sleep difficulty           Medications  Current Medications[2]   Prior to Admission medications    Medication Sig Start Date End Date Taking? Authorizing Provider   aspirin (ECOTRIN) 81 MG EC tablet Take 1 tablet (81 mg total) by mouth once daily. 9/18/24 9/18/25 Yes Jaret Armendariz MD   cabergoline (DOSTINEX) 0.5 mg tablet Take 0.5 tablets (0.25 mg total) by mouth 3 (three) times a week. 9/18/24 9/18/25 Yes Jaret Armendariz MD   cyclobenzaprine (FLEXERIL) 10 MG tablet Take 1 tablet (10 mg  total) by mouth nightly as needed for Muscle spasms. 2/6/24  Yes Jaret Armendariz MD   ergocalciferol (ERGOCALCIFEROL) 50,000 unit Cap Take 1 capsule (50,000 Units total) by mouth every 7 days. 9/18/24  Yes Jaret Armendariz MD   ezetimibe (ZETIA) 10 mg tablet Take 1 tablet (10 mg total) by mouth once daily. 1/9/25 1/9/26 Yes Ema Ontiveros FNP-C   fenofibrate (TRICOR) 145 MG tablet Take 1 tablet (145 mg total) by mouth once daily. 9/18/24  Yes Jaret Armendariz MD   gabapentin (NEURONTIN) 800 MG tablet Take 1 tablet (800 mg total) by mouth 2 (two) times daily. 2/6/24  Yes Jaret Armendariz MD   lisinopriL (PRINIVIL,ZESTRIL) 2.5 MG tablet Take 1 tablet (2.5 mg total) by mouth once daily. 9/18/24 9/18/25 Yes Jaret Armendariz MD   meloxicam (MOBIC) 7.5 MG tablet After prednisone dose complete start Mobic 7.5 mg 1 p.o. b.i.d. for wrist pain no other NSAIDs can take with Tylenol 3/19/25  Yes Jaret Armendariz MD   metoprolol succinate (TOPROL-XL) 100 MG 24 hr tablet Take 1 tablet (100 mg total) by mouth once daily. 4/2/25  Yes Ema Ontiveros FNP-C   omega-3 acid ethyl esters (LOVAZA) 1 gram capsule Take 2 capsules by mouth twice daily 2/3/25  Yes Jaret Armendariz MD   oxyCODONE-acetaminophen (PERCOCET)  mg per tablet Take 1 tablet by mouth 4 (four) times daily as needed. 2/28/25  Yes Provider, Historical   rosuvastatin (CRESTOR) 40 MG Tab Take 1 tablet (40 mg total) by mouth every evening. 9/18/24 9/18/25 Yes Jaret Armendariz MD   sertraline (ZOLOFT) 100 MG tablet Take 1 tablet (100 mg total) by mouth once daily. 9/18/24  Yes Jaret Armendariz MD   tamsulosin (FLOMAX) 0.4 mg Cap Take 1 capsule (0.4 mg total) by mouth once daily. 9/18/24 9/18/25 Yes Jaret Armendariz MD   albuterol (PROVENTIL/VENTOLIN HFA) 90 mcg/actuation inhaler Inhale 2 puffs into the lungs every 4 (four) hours as needed. Rescue 9/18/24   Jaret Armendariz MD   diazePAM (VALIUM) 5 MG tablet 1 po 1/2 hr before MRI if  still anxios at onset of test can take a second pill 1/31/25   Jaret Armendariz MD   latanoprost 0.005 % ophthalmic solution Place 1 drop into both eyes every evening. 12/4/19   Provider, Historical   methocarbamoL (ROBAXIN) 500 MG Tab 1 p.o. q.h.s. p.r.n. muscle spasm may increase to q.8 hours p.r.n. muscle spasm if needed but may make sleepy 3/19/25   Jaret Armendariz MD   predniSONE (DELTASONE) 20 MG tablet Start Thursday a.m. 1 p.o. q.d. x7 days do not take with NSAIDs can take with Tylenol 3/19/25   Jaret Armendariz MD         History  Past Medical History:   Diagnosis Date    Atherosclerosis of native coronary artery of native heart without angina pectoris 4/9/2024    Cancer     Had a tumor removed in the back    Depression     Hyperlipemia     Hypertension     Low testosterone     Vitamin D deficiency      Past Surgical History:   Procedure Laterality Date    BACK SURGERY      November 2008 in [place]    BASAL CELL CARCINOMA EXCISION      left side of lip/cheek    COLONOSCOPY N/A 01/04/2021    Procedure: COLONOSCOPY;  Surgeon: Eriberto Ladd MD;  Location: [facility] ENDO;  Service: Endoscopy;  Laterality: N/A;    COLONOSCOPY W/ POLYPECTOMY  01/04/2021    colonoscopy w/ polypectomy per  01/04/2021    CORONARY ANGIOGRAPHY  09/26/2022    CORONARY ANGIOGRAPHY Right 09/26/2022    Procedure: ANGIOGRAM, CORONARY ARTERY;  Surgeon: Anam Molina MD;  Location: [facility] CATH LAB;  Service: Cardiology;  Laterality: Right;    HERNIA REPAIR      LAMINECTOMY      PROSTATE BIOPSY      TONSILLECTOMY      1974     Social History[3]      Allergies  Review of patient's allergies indicates:   Allergen Reactions    Fish containing products Hives     TUNA FISH      Pcn [penicillins] Itching         Review of Systems   Review of Systems   Constitutional: Negative for chills, decreased appetite, diaphoresis, fever, malaise/fatigue, weight gain and weight loss.   Eyes:  Negative for blurred vision.    Cardiovascular:  Negative for chest pain, claudication, dyspnea on exertion, irregular heartbeat, leg swelling, near-syncope, orthopnea, palpitations, paroxysmal nocturnal dyspnea and syncope.   Respiratory:  Negative for cough, shortness of breath, snoring, sputum production and wheezing.    Endocrine: Negative for cold intolerance, heat intolerance, polydipsia, polyphagia and polyuria.   Skin:  Negative for color change, dry skin, itching, nail changes and poor wound healing.   Musculoskeletal:  Negative for back pain, gout, joint pain and joint swelling.   Gastrointestinal:  Negative for bloating, abdominal pain, constipation, diarrhea, hematemesis, hematochezia, melena, nausea and vomiting.   Genitourinary:  Negative for dysuria and hematuria.   Neurological:  Negative for dizziness, headaches, light-headedness, numbness, paresthesias and weakness.   Psychiatric/Behavioral:  Negative for altered mental status, depression and memory loss.          Physical Exam  Wt Readings from Last 1 Encounters:   05/08/25 128.3 kg (282 lb 13.6 oz)     BP Readings from Last 3 Encounters:   05/08/25 110/74   03/19/25 138/82   03/17/25 130/88     Pulse Readings from Last 1 Encounters:   05/08/25 86     Body mass index is 41.77 kg/m².    Physical Exam  Constitutional:       Appearance: He is obese.   HENT:      Head: Normocephalic and atraumatic.      Mouth/Throat:      Mouth: Mucous membranes are moist.   Eyes:      Extraocular Movements: Extraocular movements intact.      Pupils: Pupils are equal, round, and reactive to light.   Neck:      Vascular: No carotid bruit.   Cardiovascular:      Rate and Rhythm: Normal rate and regular rhythm.      Heart sounds: No murmur heard.     No friction rub. No gallop.   Pulmonary:      Effort: Pulmonary effort is normal. No respiratory distress.   Abdominal:      General: Abdomen is flat.      Palpations: Abdomen is soft.   Musculoskeletal:      Right lower leg: No edema.      Left lower  leg: No edema.   Skin:     General: Skin is warm.      Capillary Refill: Capillary refill takes less than 2 seconds.   Neurological:      General: No focal deficit present.      Mental Status: He is alert.   Psychiatric:         Mood and Affect: Mood normal.           Assessment & Plan    I10 Hypertension, unspecified type  E78.2 Mixed hyperlipidemia  I70.0 Aortic atherosclerosis  I25.10 Atherosclerosis of native coronary artery of native heart without angina pectoris  E66.813, E66.01, Z68.41 Class 3 severe obesity due to excess calories with serious comorbidity and body mass index (BMI) of 40.0 to 44.9 in adult  I51.89 Grade I diastolic dysfunction    IMPRESSION:  - Recent cholesterol levels have increased despite medication adherence.  - Chest pain episodes attributed to stress rather than cardiac issues.    MIXED HYPERLIPIDEMIA:  - Explained importance of dietary changes in managing cholesterol levels.  - Patient to reduce consumption of unhealthy foods (e.g., hamburgers, fried chicken, pizza), increase intake of healthier food options.  - Continued Zetia (ezetimibe).  - Continued Rosuvastatin.  - Continued omega-3 fatty acids, 2 pills twice daily.  - Continued fenofibric acid.    ATHEROSCLEROSIS OF NATIVE CORONARY ARTERY OF NATIVE HEART WITHOUT ANGINA PECTORIS:  - Discussed impact of stress on overall health and potential manifestation as chest pain.  Continue statin Zetia.    CLASS 3 SEVERE OBESITY DUE TO EXCESS CALORIES WITH SERIOUS COMORBIDITY AND BODY MASS INDEX (BMI) OF 40.0 TO 44.9 IN ADULT:  - Patient to continue walking for exercise, as tolerated.    LIFESTYLE CHANGES:  - Recommend implementing stress reduction techniques, such as deep breathing.            Brandi R. Carter, FNP-C [facility] - Cardiology.      Total professional time spent for the encounter: 40 minutes  Time was spent preparing to see the patient, reviewing results of prior testing, obtaining and/or reviewing separately obtained  history, performing a medically appropriate examination and interview, counseling and educating the patient/family, ordering medications/tests/procedures, referring and communicating with other health care professionals, documenting clinical information in the electronic health record, and independently interpreting results.    This note was generated with the assistance of ambient listening technology. Verbal consent was obtained by the patient and accompanying visitor(s) for the recording of patient appointment to facilitate this note. I attest to having reviewed and edited the generated note for accuracy, though some syntax or spelling errors may persist. Please contact the author of this note for any clarification.           [1]   Patient Active Problem List  Diagnosis    DDD (degenerative disc disease), cervical    DDD (degenerative disc disease), lumbar    Chronic lumbar radiculopathy    Post laminectomy syndrome    Lumbar facet arthropathy    Myalgia and myositis    Headache    Glaucoma    Skin cancer    Urinary incontinence    Gynecomastia    Chronic cough    Shortness of breath    Class 3 severe obesity due to excess calories with body mass index (BMI) of 40.0 to 44.9 in adult    Cervical strain    Lumbosacral strain    Chronic pain syndrome    Hypertension    Hyperlipidemia    Constipation    Left lower quadrant abdominal pain    Dizziness    Atypical chest pain    Anxiety    Ventral hernia without obstruction or gangrene    Depression    Hypogonadism male    Chronic renal impairment, stage 1    Right nephrolithiasis    Contact dermatitis    Impacted cerumen of left ear    Bright red blood per rectum    History of lumbar fusion    Spinal stenosis    Bilateral nephrolithiasis    Diverticulosis of colon    Colonic polyp    Diverticulosis    Abnormal echocardiogram    Elevated serum creatinine    Low testosterone    Right foot pain    Obesity (BMI 35.0-39.9 without comorbidity)    Pituitary lesion    Elevated  prolactin level    Elevated liver function tests    Aortic atherosclerosis    Current mild episode of major depressive disorder without prior episode    Grade I diastolic dysfunction    Atherosclerosis of native coronary artery of native heart without angina pectoris    Angina pectoris    Hypersomnolence    Chronic fatigue    Hyperprolactinoma    Morbid (severe) obesity due to excess calories    Chronic pain of left thumb    Left wrist pain   [2]   Current Outpatient Medications   Medication Sig Dispense Refill    aspirin (ECOTRIN) 81 MG EC tablet Take 1 tablet (81 mg total) by mouth once daily. 90 tablet 3    cabergoline (DOSTINEX) 0.5 mg tablet Take 0.5 tablets (0.25 mg total) by mouth 3 (three) times a week. 18 tablet 2    cyclobenzaprine (FLEXERIL) 10 MG tablet Take 1 tablet (10 mg total) by mouth nightly as needed for Muscle spasms. 30 tablet 2    ergocalciferol (ERGOCALCIFEROL) 50,000 unit Cap Take 1 capsule (50,000 Units total) by mouth every 7 days. 12 capsule 3    ezetimibe (ZETIA) 10 mg tablet Take 1 tablet (10 mg total) by mouth once daily. 90 tablet 3    fenofibrate (TRICOR) 145 MG tablet Take 1 tablet (145 mg total) by mouth once daily. 90 tablet 3    gabapentin (NEURONTIN) 800 MG tablet Take 1 tablet (800 mg total) by mouth 2 (two) times daily. 60 tablet 5    lisinopriL (PRINIVIL,ZESTRIL) 2.5 MG tablet Take 1 tablet (2.5 mg total) by mouth once daily. 90 tablet 3    meloxicam (MOBIC) 7.5 MG tablet After prednisone dose complete start Mobic 7.5 mg 1 p.o. b.i.d. for wrist pain no other NSAIDs can take with Tylenol 30 tablet 5    metoprolol succinate (TOPROL-XL) 100 MG 24 hr tablet Take 1 tablet (100 mg total) by mouth once daily. 90 tablet 3    omega-3 acid ethyl esters (LOVAZA) 1 gram capsule Take 2 capsules by mouth twice daily 360 capsule 1    oxyCODONE-acetaminophen (PERCOCET)  mg per tablet Take 1 tablet by mouth 4 (four) times daily as needed.      rosuvastatin (CRESTOR) 40 MG Tab Take 1  tablet (40 mg total) by mouth every evening. 90 tablet 3    sertraline (ZOLOFT) 100 MG tablet Take 1 tablet (100 mg total) by mouth once daily. 90 tablet 3    tamsulosin (FLOMAX) 0.4 mg Cap Take 1 capsule (0.4 mg total) by mouth once daily. 90 capsule 1    albuterol (PROVENTIL/VENTOLIN HFA) 90 mcg/actuation inhaler Inhale 2 puffs into the lungs every 4 (four) hours as needed. Rescue 18 g 5    diazePAM (VALIUM) 5 MG tablet 1 po 1/2 hr before MRI if still anxios at onset of test can take a second pill 2 tablet O    latanoprost 0.005 % ophthalmic solution Place 1 drop into both eyes every evening.      methocarbamoL (ROBAXIN) 500 MG Tab 1 p.o. q.h.s. p.r.n. muscle spasm may increase to q.8 hours p.r.n. muscle spasm if needed but may make sleepy 60 tablet 5    predniSONE (DELTASONE) 20 MG tablet Start Thursday a.m. 1 p.o. q.d. x7 days do not take with NSAIDs can take with Tylenol 7 tablet 0     No current facility-administered medications for this visit.   [3]   Social History  Socioeconomic History    Marital status: Legally    Tobacco Use    Smoking status: Never     Passive exposure: Never    Smokeless tobacco: Never   Substance and Sexual Activity    Alcohol use: No    Drug use: No

## 2025-05-27 ENCOUNTER — OFFICE VISIT (OUTPATIENT)
Dept: PRIMARY CARE CLINIC | Facility: CLINIC | Age: 60
End: 2025-05-27
Payer: MEDICARE

## 2025-05-27 VITALS
HEART RATE: 76 BPM | HEIGHT: 69 IN | OXYGEN SATURATION: 96 % | DIASTOLIC BLOOD PRESSURE: 82 MMHG | BODY MASS INDEX: 42.37 KG/M2 | SYSTOLIC BLOOD PRESSURE: 114 MMHG | WEIGHT: 286.06 LBS | RESPIRATION RATE: 18 BRPM

## 2025-05-27 DIAGNOSIS — I10 HYPERTENSION, UNSPECIFIED TYPE: ICD-10-CM

## 2025-05-27 DIAGNOSIS — M96.1 POST LAMINECTOMY SYNDROME: ICD-10-CM

## 2025-05-27 DIAGNOSIS — G89.4 CHRONIC PAIN SYNDROME: ICD-10-CM

## 2025-05-27 DIAGNOSIS — E23.7 PITUITARY LESION: Chronic | ICD-10-CM

## 2025-05-27 DIAGNOSIS — M51.360 DEGENERATION OF INTERVERTEBRAL DISC OF LUMBAR REGION WITH DISCOGENIC BACK PAIN: ICD-10-CM

## 2025-05-27 DIAGNOSIS — G89.29 CHRONIC PAIN OF LEFT THUMB: ICD-10-CM

## 2025-05-27 DIAGNOSIS — M50.30 DDD (DEGENERATIVE DISC DISEASE), CERVICAL: ICD-10-CM

## 2025-05-27 DIAGNOSIS — F41.9 ANXIETY: ICD-10-CM

## 2025-05-27 DIAGNOSIS — F32.0 CURRENT MILD EPISODE OF MAJOR DEPRESSIVE DISORDER WITHOUT PRIOR EPISODE: ICD-10-CM

## 2025-05-27 DIAGNOSIS — K59.01 SLOW TRANSIT CONSTIPATION: Chronic | ICD-10-CM

## 2025-05-27 DIAGNOSIS — E66.01 MORBID (SEVERE) OBESITY DUE TO EXCESS CALORIES: ICD-10-CM

## 2025-05-27 DIAGNOSIS — S39.012D LUMBOSACRAL STRAIN, SUBSEQUENT ENCOUNTER: Primary | ICD-10-CM

## 2025-05-27 DIAGNOSIS — M54.16 CHRONIC LUMBAR RADICULOPATHY: ICD-10-CM

## 2025-05-27 DIAGNOSIS — M79.645 CHRONIC PAIN OF LEFT THUMB: ICD-10-CM

## 2025-05-27 DIAGNOSIS — M48.00 SPINAL STENOSIS, UNSPECIFIED SPINAL REGION: ICD-10-CM

## 2025-05-27 DIAGNOSIS — E29.1 HYPOGONADISM MALE: ICD-10-CM

## 2025-05-27 DIAGNOSIS — F32.9 REACTIVE DEPRESSION: ICD-10-CM

## 2025-05-27 DIAGNOSIS — Z98.1 HISTORY OF LUMBAR FUSION: ICD-10-CM

## 2025-05-27 PROCEDURE — 99999 PR PBB SHADOW E&M-EST. PATIENT-LVL IV: CPT | Mod: PBBFAC,HCNC,, | Performed by: FAMILY MEDICINE

## 2025-05-27 NOTE — PROGRESS NOTES
Subjective:       Patient ID: Collin Almaguer Sr. is a 60 y.o. male.    Chief Complaint: 3 month check up        HPI 60-year-old white male in for three-month checkup--no energy--always tired and exhausted  Last time had energy was getting testosterone shots--last testosterone test was February 2024 were normal prior to that for a year before were abnormally low  Hx pituitary lesion7 mm ant pituitary area prolactin level has improved from 46-21 on 9.5  Hyperlipidemia on atorvastatin-- crestor   BPH on flomax   Hypertension blood pressure 138/82 on Toprol increased 25 mg to 50 m=g  Hx MI on baby ASA and Imdur   History depression on Zoloft   history of bronchospasm on albuterol  Patient states continually falling asleep--sometimes for 1-2 hours--was supposed to have sleep study but never did it worried may have narcolepsy--  Back pain--had laminectomy 2010--history of arthritis has exercise gets pain in the lower back and hips bilaterally--gets to where can't take another step   On meloxicam qd to increase to bid        ROS:    Skin: no psoriasis, eczema, skin cancer--  HEENT: +occs  headache see HPI --pituitary tumor--still having headache ,no  ocular pain, blurred vision, diplopia, epistaxis, hoarseness change in voice, thyroid trouble +glaucoma itis bilaterally left greater than right  Lung: No pneumonia, +asthma as child ,no  Tb, wheezing, SOB, no smoking occasional bronchospasm  Heart: no chest pain,no ankle edema, palpitations, MI, tiffanie murmur,+hypertension,+ hyperlipidemia--no stent bypass arrhythmia--never seen by cardiologist   Abdomen: no nausea, no  vomiting, diarrhea,+ constipation-a lot better no  ulcers, hepatitis, gallbladder disease, melena, hematochezia, hematemesis   : no UTI, renal disease, stones prostate  MS: no fractures, O/A, lupus, rheumatoid, gout--neck and back problem--old xray 2020 OK --laminectomy and rods hips --last fall patient gel over pile of debris in yd neighbor was  working kitchen counter 2021  Neuro: no  Dizziness, no  LOC, seizures   No diabetes, no anemia, +anxiety, + depression on meds Disabled  --being home all the time makes patient depressed--found father is at home  2014  , 5 children, disability lives alone        Objective:   Physical Exam:  General: Well nourished, well developed, no acute distress + morbid obesity  Skin:  No lesion   HEENT: Eyes PERRLA, EOM intact, nose patent, throat non-erythematous ears TMs clear  NECK: Supple, no bruits, No JVD, no nodes   Lungs: Clear, no rales, rhonchi, wheezing  Heart: Regular rate and rhythm, no murmurs, gallops, or rubs  Abdomen: flat, bowel sounds positive, no tenderness, or organomegaly some slight ventral herniation--mild--some complaints of right costal angle swelling that comes and goes suggestive of hernia   MS:--tenderness lower back has a scar from about W50-G3--raxlffshsa with palpation--anterior flexion 20° extension 10° lateral flexion rotation 10° straight leg lift pulling sensation in the back--able squat long-term down hard to arise---has pain in the hips bilaterally with any type of exercise--still with some pain in the left thumb--goes to pain management  Neuro: Alert, CN intact, oriented X 3 Romberg negative heel-toe slight swaying due back issues   Extremities: No cyanosis, clubbing, or edema         Assessment:       1. Lumbosacral strain, subsequent encounter    2. DDD (degenerative disc disease), cervical    3. Degeneration of intervertebral disc of lumbar region with discogenic back pain    4. Current mild episode of major depressive disorder without prior episode    5. Slow transit constipation    6. Chronic pain of left thumb    7. Chronic pain syndrome    8. Chronic lumbar radiculopathy    9. Anxiety    10. Reactive depression    11. History of lumbar fusion    12. Hypertension, unspecified type    13. Hypogonadism male    14. Pituitary lesion    15. Post laminectomy  syndrome    16. Spinal stenosis, unspecified spinal region    17. Morbid (severe) obesity due to excess calories                Plan:       Lumbosacral strain, subsequent encounter    DDD (degenerative disc disease), cervical    Degeneration of intervertebral disc of lumbar region with discogenic back pain    Current mild episode of major depressive disorder without prior episode    Slow transit constipation    Chronic pain of left thumb    Chronic pain syndrome    Chronic lumbar radiculopathy    Anxiety    Reactive depression    History of lumbar fusion    Hypertension, unspecified type  -     CBC Auto Differential; Future; Expected date: 11/27/2025  -     Comprehensive Metabolic Panel; Future; Expected date: 11/27/2025  -     Lipid Panel; Future; Expected date: 11/27/2025    Hypogonadism male  -     Testosterone Panel; Future; Expected date: 05/27/2025    Pituitary lesion    Post laminectomy syndrome    Spinal stenosis, unspecified spinal region    Morbid (severe) obesity due to excess calories                Multiple medical issues -  Chronic pain syndrome---history lumbar fusion bilateral hip pain--DDD cervical and lumbar spine--patient has difficulty exercising because of does develops severe pain in both hips--goes to chronic pain clinic--gets oxycodone--had epidural steroid injection once had significant pain in the back afraid to get it again-  Left thumb pain in left wrist pain---mainly in the left thenar area in the supinator muscle of the left wrist---x-ray left wrist and left hand---Kenalog 40 mg IM---prednisone 20 mg q.d. x7 days start tomorrow Thursday--after prednisone complete Mobic 7.5 mg 1 p.o. b.i.d. no other NSAIDs can take with Tylenol---Robaxin 500 mg q.h.s. for muscle spasm---could get a spica splint--wear daily for about 6 weeks if not better will refer to a hand surgeon  See neurosurgeon-7 mm anterior pituitary tumor--patient was txed with medication x 6 mo --lesion same size told redo MRI  one year check size tumor   Numbness in the left foot--intermittent bilateral foot pain--needs to see Dr. Alatorre--Neuro surgery--history laminectomy chronic back pain   Low testosterone past but last testosterone was normal--1 year ago complaining chronic fatigue only times fell good was when he took testosterone will repeat testosterone levels  Skin lesion right side of the nose with increased vasculature see Dr. Wise sent pt to surgeon for surgical removal   MRI lumbar spine showed fusion L3-S1 with laminectomy/L1 to with some moderate to severe cord compression due to bulging disc and facet hyper  Chronic back pain--goes to chronic pain clinic---x-ray reviewed from 2020 of pelvic--shows lumbar fusion--with laminectomy--with rods going to both hips--patient states had recent x-ray had diagnostic imaging to be reviewed by Neuro surgery  Morbid obesity--exercise trying get down to ideal body weight--could consider gastric sleeve or bypass --patient was asking me for Adipex told I do not write it ecent   Depression--needs to see a psychiatrist--multiple issues that need to be addressed should be on medication for depression--needs to see psychiatrist--if on SSRI such as Lexapro initially would be helpful with weight but in time but making gain weight may benefit from Wellbutrin  Hyperlipidemia patient on Lipitor tri cor  Hypertension/hyperlipidemia metoprolol 25 mg  History vitamin-D deficiency on vitamin-D  Lab  CBCs CMP lipid prolactin Vit D testosterone panel  Needs to see neurosurgeon to evaluate lower back and Pituitary lesion  Appt cardiology  for chest pain history angiogram 2022 with a block vessel with collateral circulation some atypical chest pain Had EKG Jan 2024 nonspecific ST wave changes   Patient most concerned today with left wrist treat with Kenalog/prednisone/Mobic/Robaxin no better to hand surgeon--thumb spica splint  Needs follow-up for pituitary tumor/has chronic back pain morbid obesity  history of chest check lab

## 2025-06-27 DIAGNOSIS — I25.10 ATHEROSCLEROSIS OF NATIVE CORONARY ARTERY OF NATIVE HEART WITHOUT ANGINA PECTORIS: ICD-10-CM

## 2025-06-27 DIAGNOSIS — E78.00 PURE HYPERCHOLESTEROLEMIA: ICD-10-CM

## 2025-06-29 RX ORDER — ROSUVASTATIN CALCIUM 40 MG/1
40 TABLET, COATED ORAL NIGHTLY
Qty: 100 TABLET | Refills: 3 | Status: SHIPPED | OUTPATIENT
Start: 2025-06-29 | End: 2026-06-29

## 2025-06-29 RX ORDER — LATANOPROST 50 UG/ML
1 SOLUTION/ DROPS OPHTHALMIC NIGHTLY
Qty: 3 ML | Refills: 0 | Status: SHIPPED | OUTPATIENT
Start: 2025-06-29

## 2025-08-23 DIAGNOSIS — R79.89 ELEVATED PROLACTIN LEVEL: ICD-10-CM

## 2025-08-23 DIAGNOSIS — E78.5 HYPERLIPIDEMIA, UNSPECIFIED HYPERLIPIDEMIA TYPE: ICD-10-CM

## 2025-08-23 DIAGNOSIS — I25.10 CORONARY ARTERY DISEASE INVOLVING NATIVE CORONARY ARTERY OF NATIVE HEART, UNSPECIFIED WHETHER ANGINA PRESENT: ICD-10-CM

## 2025-08-24 RX ORDER — OMEGA-3-ACID ETHYL ESTERS 1 G/1
2 CAPSULE, LIQUID FILLED ORAL 2 TIMES DAILY
Qty: 360 CAPSULE | Refills: 1 | Status: SHIPPED | OUTPATIENT
Start: 2025-08-24

## 2025-08-24 RX ORDER — CABERGOLINE 0.5 MG/1
TABLET ORAL
Qty: 18 TABLET | Refills: 0 | Status: SHIPPED | OUTPATIENT
Start: 2025-08-24